# Patient Record
Sex: MALE | Race: OTHER | NOT HISPANIC OR LATINO
[De-identification: names, ages, dates, MRNs, and addresses within clinical notes are randomized per-mention and may not be internally consistent; named-entity substitution may affect disease eponyms.]

---

## 2017-05-04 ENCOUNTER — APPOINTMENT (OUTPATIENT)
Dept: UROLOGY | Facility: CLINIC | Age: 72
End: 2017-05-04

## 2017-05-04 LAB
APPEARANCE: CLEAR
BACTERIA: NEGATIVE
BILIRUBIN URINE: NEGATIVE
BLOOD URINE: NEGATIVE
COLOR: YELLOW
GLUCOSE QUALITATIVE U: NORMAL MG/DL
HYALINE CASTS: 2 /LPF
KETONES URINE: NEGATIVE
LEUKOCYTE ESTERASE URINE: NEGATIVE
MICROSCOPIC-UA: NORMAL
NITRITE URINE: NEGATIVE
PH URINE: 5.5
PROTEIN URINE: NEGATIVE MG/DL
PSA FREE FLD-MCNC: 56.7 %
PSA FREE SERPL-MCNC: 0.2 NG/ML
PSA SERPL-MCNC: 0.35 NG/ML
RED BLOOD CELLS URINE: 6 /HPF
SPECIFIC GRAVITY URINE: 1.02
SQUAMOUS EPITHELIAL CELLS: 0 /HPF
UROBILINOGEN URINE: NORMAL MG/DL
WHITE BLOOD CELLS URINE: 2 /HPF

## 2018-01-30 ENCOUNTER — TRANSCRIPTION ENCOUNTER (OUTPATIENT)
Age: 73
End: 2018-01-30

## 2018-01-30 ENCOUNTER — OUTPATIENT (OUTPATIENT)
Dept: OUTPATIENT SERVICES | Facility: HOSPITAL | Age: 73
LOS: 1 days | End: 2018-01-30
Payer: MEDICARE

## 2018-01-30 VITALS
DIASTOLIC BLOOD PRESSURE: 73 MMHG | HEART RATE: 72 BPM | RESPIRATION RATE: 17 BRPM | SYSTOLIC BLOOD PRESSURE: 125 MMHG | OXYGEN SATURATION: 100 %

## 2018-01-30 VITALS
SYSTOLIC BLOOD PRESSURE: 132 MMHG | OXYGEN SATURATION: 100 % | HEART RATE: 67 BPM | TEMPERATURE: 98 F | RESPIRATION RATE: 17 BRPM | HEIGHT: 67 IN | DIASTOLIC BLOOD PRESSURE: 72 MMHG | WEIGHT: 201.94 LBS

## 2018-01-30 DIAGNOSIS — H25.812 COMBINED FORMS OF AGE-RELATED CATARACT, LEFT EYE: ICD-10-CM

## 2018-01-30 DIAGNOSIS — Z96.652 PRESENCE OF LEFT ARTIFICIAL KNEE JOINT: Chronic | ICD-10-CM

## 2018-01-30 DIAGNOSIS — Z96.659 PRESENCE OF UNSPECIFIED ARTIFICIAL KNEE JOINT: Chronic | ICD-10-CM

## 2018-01-30 LAB
GLUCOSE BLDC GLUCOMTR-MCNC: 123 MG/DL — HIGH (ref 70–99)
GLUCOSE BLDC GLUCOMTR-MCNC: 72 MG/DL — SIGNIFICANT CHANGE UP (ref 70–99)

## 2018-01-30 PROCEDURE — 66984 XCAPSL CTRC RMVL W/O ECP: CPT | Mod: LT

## 2018-01-30 PROCEDURE — 82962 GLUCOSE BLOOD TEST: CPT

## 2018-01-30 PROCEDURE — V2632: CPT

## 2018-01-30 NOTE — ASU DISCHARGE PLAN (ADULT/PEDIATRIC). - PT EDUC
intraocular lens/Implant card (specify) other (specify)/Implant card (specify)/Intraocular lens implant (IOL) Eye shield with instructions , sunglasses and eye kit given to patient.

## 2018-01-30 NOTE — ASU PATIENT PROFILE, ADULT - PMH
Cardiomyopathy  LVH, LV diastolic dysfunction, EF 42%  Diabetes    Hyperlipidemia    Hypertension    Osteoarthritis

## 2018-01-30 NOTE — ASU DISCHARGE PLAN (ADULT/PEDIATRIC). - PROCEDURE
left eye cataract with IOL implant Left eye phacoemulsification cataract extraction with Intraocular Lens implant

## 2018-01-30 NOTE — ASU DISCHARGE PLAN (ADULT/PEDIATRIC). - NOTIFY
Fever greater than 101/Pain not relieved by Medications/Persistent Nausea and Vomiting Pain not relieved by Medications/Bleeding that does not stop/Fever greater than 101/Swelling that continues/Persistent Nausea and Vomiting

## 2018-05-17 ENCOUNTER — APPOINTMENT (OUTPATIENT)
Dept: UROLOGY | Facility: CLINIC | Age: 73
End: 2018-05-17
Payer: MEDICARE

## 2018-05-17 LAB
APPEARANCE: CLEAR
BACTERIA: NEGATIVE
BILIRUBIN URINE: NEGATIVE
BLOOD URINE: NEGATIVE
COLOR: YELLOW
GLUCOSE QUALITATIVE U: NEGATIVE MG/DL
KETONES URINE: NEGATIVE
LEUKOCYTE ESTERASE URINE: NEGATIVE
MICROSCOPIC-UA: NORMAL
NITRITE URINE: NEGATIVE
PH URINE: 6
PROTEIN URINE: NEGATIVE MG/DL
RED BLOOD CELLS URINE: 1 /HPF
SPECIFIC GRAVITY URINE: 1.01
SQUAMOUS EPITHELIAL CELLS: 0 /HPF
UROBILINOGEN URINE: NEGATIVE MG/DL
WHITE BLOOD CELLS URINE: 0 /HPF

## 2018-05-17 PROCEDURE — 99214 OFFICE O/P EST MOD 30 MIN: CPT

## 2018-05-18 LAB
PSA FREE FLD-MCNC: 54.5
PSA FREE SERPL-MCNC: 0.18 NG/ML
PSA SERPL-MCNC: 0.33 NG/ML

## 2019-05-15 ENCOUNTER — APPOINTMENT (OUTPATIENT)
Dept: UROLOGY | Facility: CLINIC | Age: 74
End: 2019-05-15
Payer: MEDICARE

## 2019-05-15 PROCEDURE — 99214 OFFICE O/P EST MOD 30 MIN: CPT

## 2019-05-15 NOTE — PHYSICAL EXAM
[General Appearance - Well Developed] : well developed [General Appearance - Well Nourished] : well nourished [Normal Appearance] : normal appearance [Well Groomed] : well groomed [General Appearance - In No Acute Distress] : no acute distress [Abdomen Tenderness] : non-tender [Abdomen Soft] : soft [Costovertebral Angle Tenderness] : no ~M costovertebral angle tenderness [Urethral Meatus] : meatus normal [Urinary Bladder Findings] : the bladder was normal on palpation [Testes Mass (___cm)] : there were no testicular masses [Scrotum] : the scrotum was normal [No Prostate Nodules] : no prostate nodules [Edema] : no peripheral edema [] : no respiratory distress [Respiration, Rhythm And Depth] : normal respiratory rhythm and effort [Oriented To Time, Place, And Person] : oriented to person, place, and time [Exaggerated Use Of Accessory Muscles For Inspiration] : no accessory muscle use [Affect] : the affect was normal [Mood] : the mood was normal [Not Anxious] : not anxious [Normal Station and Gait] : the gait and station were normal for the patient's age [No Focal Deficits] : no focal deficits [No Palpable Adenopathy] : no palpable adenopathy

## 2019-05-16 LAB
APPEARANCE: CLEAR
BACTERIA: NEGATIVE
BILIRUBIN URINE: NEGATIVE
BLOOD URINE: NEGATIVE
COLOR: NORMAL
GLUCOSE QUALITATIVE U: NEGATIVE
HYALINE CASTS: 0 /LPF
KETONES URINE: NEGATIVE
LEUKOCYTE ESTERASE URINE: NEGATIVE
MICROSCOPIC-UA: NORMAL
NITRITE URINE: NEGATIVE
PH URINE: 6.5
PROTEIN URINE: NEGATIVE
RED BLOOD CELLS URINE: 1 /HPF
SPECIFIC GRAVITY URINE: 1.01
SQUAMOUS EPITHELIAL CELLS: 0 /HPF
UROBILINOGEN URINE: NORMAL
WHITE BLOOD CELLS URINE: 0 /HPF

## 2019-12-25 ENCOUNTER — EMERGENCY (EMERGENCY)
Facility: HOSPITAL | Age: 74
LOS: 1 days | End: 2019-12-25
Attending: EMERGENCY MEDICINE
Payer: MEDICARE

## 2019-12-25 VITALS
WEIGHT: 173.06 LBS | OXYGEN SATURATION: 99 % | SYSTOLIC BLOOD PRESSURE: 164 MMHG | TEMPERATURE: 98 F | HEART RATE: 70 BPM | RESPIRATION RATE: 19 BRPM | DIASTOLIC BLOOD PRESSURE: 77 MMHG | HEIGHT: 68 IN

## 2019-12-25 DIAGNOSIS — Z96.659 PRESENCE OF UNSPECIFIED ARTIFICIAL KNEE JOINT: Chronic | ICD-10-CM

## 2019-12-25 DIAGNOSIS — Z96.652 PRESENCE OF LEFT ARTIFICIAL KNEE JOINT: Chronic | ICD-10-CM

## 2019-12-25 LAB
ALBUMIN SERPL ELPH-MCNC: 4 G/DL — SIGNIFICANT CHANGE UP (ref 3.3–5)
ALP SERPL-CCNC: 72 U/L — SIGNIFICANT CHANGE UP (ref 40–120)
ALT FLD-CCNC: 17 U/L — SIGNIFICANT CHANGE UP (ref 10–45)
ANION GAP SERPL CALC-SCNC: 13 MMOL/L — SIGNIFICANT CHANGE UP (ref 5–17)
AST SERPL-CCNC: 25 U/L — SIGNIFICANT CHANGE UP (ref 10–40)
BASOPHILS # BLD AUTO: 0.01 K/UL — SIGNIFICANT CHANGE UP (ref 0–0.2)
BASOPHILS NFR BLD AUTO: 0.2 % — SIGNIFICANT CHANGE UP (ref 0–2)
BILIRUB SERPL-MCNC: 0.3 MG/DL — SIGNIFICANT CHANGE UP (ref 0.2–1.2)
BUN SERPL-MCNC: 14 MG/DL — SIGNIFICANT CHANGE UP (ref 7–23)
CALCIUM SERPL-MCNC: 9.4 MG/DL — SIGNIFICANT CHANGE UP (ref 8.4–10.5)
CHLORIDE SERPL-SCNC: 106 MMOL/L — SIGNIFICANT CHANGE UP (ref 96–108)
CO2 SERPL-SCNC: 23 MMOL/L — SIGNIFICANT CHANGE UP (ref 22–31)
CREAT SERPL-MCNC: 0.87 MG/DL — SIGNIFICANT CHANGE UP (ref 0.5–1.3)
EOSINOPHIL # BLD AUTO: 0.31 K/UL — SIGNIFICANT CHANGE UP (ref 0–0.5)
EOSINOPHIL NFR BLD AUTO: 4.9 % — SIGNIFICANT CHANGE UP (ref 0–6)
GLUCOSE BLDC GLUCOMTR-MCNC: 113 MG/DL — HIGH (ref 70–99)
GLUCOSE BLDC GLUCOMTR-MCNC: 128 MG/DL — HIGH (ref 70–99)
GLUCOSE SERPL-MCNC: 86 MG/DL — SIGNIFICANT CHANGE UP (ref 70–99)
HCT VFR BLD CALC: 43 % — SIGNIFICANT CHANGE UP (ref 39–50)
HGB BLD-MCNC: 13.6 G/DL — SIGNIFICANT CHANGE UP (ref 13–17)
IMM GRANULOCYTES NFR BLD AUTO: 0.2 % — SIGNIFICANT CHANGE UP (ref 0–1.5)
LYMPHOCYTES # BLD AUTO: 0.97 K/UL — LOW (ref 1–3.3)
LYMPHOCYTES # BLD AUTO: 15.3 % — SIGNIFICANT CHANGE UP (ref 13–44)
MCHC RBC-ENTMCNC: 29.4 PG — SIGNIFICANT CHANGE UP (ref 27–34)
MCHC RBC-ENTMCNC: 31.6 GM/DL — LOW (ref 32–36)
MCV RBC AUTO: 92.9 FL — SIGNIFICANT CHANGE UP (ref 80–100)
MONOCYTES # BLD AUTO: 0.6 K/UL — SIGNIFICANT CHANGE UP (ref 0–0.9)
MONOCYTES NFR BLD AUTO: 9.5 % — SIGNIFICANT CHANGE UP (ref 2–14)
NEUTROPHILS # BLD AUTO: 4.44 K/UL — SIGNIFICANT CHANGE UP (ref 1.8–7.4)
NEUTROPHILS NFR BLD AUTO: 69.9 % — SIGNIFICANT CHANGE UP (ref 43–77)
NRBC # BLD: 0 /100 WBCS — SIGNIFICANT CHANGE UP (ref 0–0)
PLATELET # BLD AUTO: 234 K/UL — SIGNIFICANT CHANGE UP (ref 150–400)
POTASSIUM SERPL-MCNC: 4.1 MMOL/L — SIGNIFICANT CHANGE UP (ref 3.5–5.3)
POTASSIUM SERPL-SCNC: 4.1 MMOL/L — SIGNIFICANT CHANGE UP (ref 3.5–5.3)
PROT SERPL-MCNC: 6.8 G/DL — SIGNIFICANT CHANGE UP (ref 6–8.3)
RBC # BLD: 4.63 M/UL — SIGNIFICANT CHANGE UP (ref 4.2–5.8)
RBC # FLD: 13.6 % — SIGNIFICANT CHANGE UP (ref 10.3–14.5)
SODIUM SERPL-SCNC: 142 MMOL/L — SIGNIFICANT CHANGE UP (ref 135–145)
WBC # BLD: 6.34 K/UL — SIGNIFICANT CHANGE UP (ref 3.8–10.5)
WBC # FLD AUTO: 6.34 K/UL — SIGNIFICANT CHANGE UP (ref 3.8–10.5)

## 2019-12-25 PROCEDURE — 99218: CPT

## 2019-12-25 RX ORDER — SODIUM CHLORIDE 9 MG/ML
1000 INJECTION, SOLUTION INTRAVENOUS
Refills: 0 | Status: DISCONTINUED | OUTPATIENT
Start: 2019-12-25 | End: 2019-12-30

## 2019-12-25 RX ORDER — INSULIN LISPRO 100/ML
VIAL (ML) SUBCUTANEOUS
Refills: 0 | Status: DISCONTINUED | OUTPATIENT
Start: 2019-12-25 | End: 2019-12-30

## 2019-12-25 RX ORDER — SODIUM CHLORIDE 9 MG/ML
3 INJECTION INTRAMUSCULAR; INTRAVENOUS; SUBCUTANEOUS EVERY 12 HOURS
Refills: 0 | Status: DISCONTINUED | OUTPATIENT
Start: 2019-12-25 | End: 2019-12-30

## 2019-12-25 RX ORDER — GLUCAGON INJECTION, SOLUTION 0.5 MG/.1ML
1 INJECTION, SOLUTION SUBCUTANEOUS ONCE
Refills: 0 | Status: DISCONTINUED | OUTPATIENT
Start: 2019-12-25 | End: 2019-12-30

## 2019-12-25 RX ORDER — METFORMIN HYDROCHLORIDE 850 MG/1
1000 TABLET ORAL
Refills: 0 | Status: DISCONTINUED | OUTPATIENT
Start: 2019-12-25 | End: 2019-12-25

## 2019-12-25 RX ORDER — CARVEDILOL PHOSPHATE 80 MG/1
6.25 CAPSULE, EXTENDED RELEASE ORAL EVERY 12 HOURS
Refills: 0 | Status: DISCONTINUED | OUTPATIENT
Start: 2019-12-25 | End: 2019-12-30

## 2019-12-25 RX ORDER — DONEPEZIL HYDROCHLORIDE 10 MG/1
10 TABLET, FILM COATED ORAL AT BEDTIME
Refills: 0 | Status: DISCONTINUED | OUTPATIENT
Start: 2019-12-25 | End: 2019-12-30

## 2019-12-25 RX ORDER — DIPHENHYDRAMINE HCL 50 MG
25 CAPSULE ORAL ONCE
Refills: 0 | Status: COMPLETED | OUTPATIENT
Start: 2019-12-25 | End: 2019-12-25

## 2019-12-25 RX ORDER — SODIUM CHLORIDE 9 MG/ML
500 INJECTION INTRAMUSCULAR; INTRAVENOUS; SUBCUTANEOUS ONCE
Refills: 0 | Status: COMPLETED | OUTPATIENT
Start: 2019-12-25 | End: 2019-12-25

## 2019-12-25 RX ORDER — DEXTROSE 50 % IN WATER 50 %
25 SYRINGE (ML) INTRAVENOUS ONCE
Refills: 0 | Status: DISCONTINUED | OUTPATIENT
Start: 2019-12-25 | End: 2019-12-30

## 2019-12-25 RX ORDER — DEXTROSE 50 % IN WATER 50 %
12.5 SYRINGE (ML) INTRAVENOUS ONCE
Refills: 0 | Status: DISCONTINUED | OUTPATIENT
Start: 2019-12-25 | End: 2019-12-30

## 2019-12-25 RX ORDER — FAMOTIDINE 10 MG/ML
20 INJECTION INTRAVENOUS ONCE
Refills: 0 | Status: DISCONTINUED | OUTPATIENT
Start: 2019-12-25 | End: 2019-12-25

## 2019-12-25 RX ORDER — FAMOTIDINE 10 MG/ML
20 INJECTION INTRAVENOUS ONCE
Refills: 0 | Status: COMPLETED | OUTPATIENT
Start: 2019-12-25 | End: 2019-12-25

## 2019-12-25 RX ORDER — DIPHENHYDRAMINE HCL 50 MG
50 CAPSULE ORAL ONCE
Refills: 0 | Status: COMPLETED | OUTPATIENT
Start: 2019-12-25 | End: 2019-12-25

## 2019-12-25 RX ORDER — METFORMIN HYDROCHLORIDE 850 MG/1
1000 TABLET ORAL
Refills: 0 | Status: DISCONTINUED | OUTPATIENT
Start: 2019-12-25 | End: 2019-12-30

## 2019-12-25 RX ORDER — ASPIRIN/CALCIUM CARB/MAGNESIUM 324 MG
81 TABLET ORAL DAILY
Refills: 0 | Status: DISCONTINUED | OUTPATIENT
Start: 2019-12-25 | End: 2019-12-30

## 2019-12-25 RX ORDER — DEXTROSE 50 % IN WATER 50 %
15 SYRINGE (ML) INTRAVENOUS ONCE
Refills: 0 | Status: DISCONTINUED | OUTPATIENT
Start: 2019-12-25 | End: 2019-12-30

## 2019-12-25 RX ORDER — FAMOTIDINE 10 MG/ML
20 INJECTION INTRAVENOUS
Refills: 0 | Status: DISCONTINUED | OUTPATIENT
Start: 2019-12-25 | End: 2019-12-30

## 2019-12-25 RX ADMIN — CARVEDILOL PHOSPHATE 6.25 MILLIGRAM(S): 80 CAPSULE, EXTENDED RELEASE ORAL at 21:44

## 2019-12-25 RX ADMIN — Medication 25 MILLIGRAM(S): at 21:45

## 2019-12-25 RX ADMIN — SODIUM CHLORIDE 500 MILLILITER(S): 9 INJECTION INTRAMUSCULAR; INTRAVENOUS; SUBCUTANEOUS at 11:20

## 2019-12-25 RX ADMIN — Medication 50 MILLIGRAM(S): at 11:15

## 2019-12-25 RX ADMIN — METFORMIN HYDROCHLORIDE 1000 MILLIGRAM(S): 850 TABLET ORAL at 21:45

## 2019-12-25 RX ADMIN — FAMOTIDINE 20 MILLIGRAM(S): 10 INJECTION INTRAVENOUS at 11:15

## 2019-12-25 RX ADMIN — FAMOTIDINE 20 MILLIGRAM(S): 10 INJECTION INTRAVENOUS at 23:30

## 2019-12-25 RX ADMIN — SODIUM CHLORIDE 3 MILLILITER(S): 9 INJECTION INTRAMUSCULAR; INTRAVENOUS; SUBCUTANEOUS at 13:15

## 2019-12-25 RX ADMIN — Medication 125 MILLIGRAM(S): at 11:16

## 2019-12-25 NOTE — ED CDU PROVIDER DISPOSITION NOTE - PATIENT PORTAL LINK FT
You can access the FollowMyHealth Patient Portal offered by [facility] by registering at the following website: [email or web address]. By joining [facility]’s FollowMyHealth portal, you will also be able to view your health information using other applications (apps) compatible with our system.

## 2019-12-25 NOTE — ED CDU PROVIDER DISPOSITION NOTE - NSFOLLOWUPINSTRUCTIONS_ED_ALL_ED_FT
As recommended by Dermatology.......... 1. Please follow up with your PMD in 1-5 days.  Follow up with dermatology within 2-3 weeks - see attached info.    2. Take prednisone 40mg once daily as prescribed. Apply triamcinolone cream topically daily.    3. For continued itching/discomfort take benadryl as needed.    4. Return to ED for difficulty speaking, difficulty breathing, fever, chills, or any other concerns.

## 2019-12-25 NOTE — ED CDU PROVIDER DISPOSITION NOTE - ATTENDING CONTRIBUTION TO CARE
CDU DISCHARGE NOTE - Dr. Barney Attending : Patient is stable for discharge to home.  Labs and tests reviewed with the patient.  The patient is to follow up with their doctor as discussed.     I have personally seen and examined this patient. I have discussed the case with the ACP. I have reviewed all pertinent clinical information, including history, physical exam, plan and the ACP’s note and agree except as noted.

## 2019-12-25 NOTE — ED CDU PROVIDER DISPOSITION NOTE - CLINICAL COURSE
75 y/o M with PMHx fo HTN and DM presents c/o allergic reaction/rash. As per pt he was seen by derm 2 weeks ago, diagnosed with "severe eczema, contact dermatitis", given a list of suggested body washes, shampoo, and moisturizing cream (Cetaphil), also given alcbometasone? 5% cream for the face and fluocinolone 0.01% oil for the scalp. Family also relates that they changed their detergent to a more hypoallergenic version. Pt states yesterday he began having pruritus of the forearms and antecubital fossae, this morning awoke with worsening rash of the face, neck, scalp, and forearms. Also notes some pruritus of the lower abdomen. Pt denies SOB, dyspnea, throat itching or closing, drooling, change in voice, nuchal rigidity, sore or lesions in the mouth, genitals, or anus, CP, abd pain, n/v/d. No prior similar episodes. Pt recently started B12 injections, denies other new meds or medication adjustments.  In ED, patient was given IV steroids, Benadryl and Pepcid. Pt sent to CDU for continued antihistamines, steroids, frequent eval, derm consult in the AM. 73 y/o M with PMHx fo HTN and DM presents c/o allergic reaction/rash. As per pt he was seen by derm 2 weeks ago, diagnosed with "severe eczema, contact dermatitis", given a list of suggested body washes, shampoo, and moisturizing cream (Cetaphil), also given alcbometasone? 5% cream for the face and fluocinolone 0.01% oil for the scalp. Family also relates that they changed their detergent to a more hypoallergenic version. Pt states yesterday he began having pruritus of the forearms and antecubital fossae, this morning awoke with worsening rash of the face, neck, scalp, and forearms. Also notes some pruritus of the lower abdomen. Pt denies SOB, dyspnea, throat itching or closing, drooling, change in voice, nuchal rigidity, sore or lesions in the mouth, genitals, or anus, CP, abd pain, n/v/d. No prior similar episodes. Pt recently started B12 injections, denies other new meds or medication adjustments.  In ED, patient was given IV steroids, Benadryl and Pepcid. Pt sent to CDU for continued antihistamines, steroids, frequent eval, derm consult in the AM.  Pt did well overnight improved with wet to dry dressings, benadryl + steroids. seen by dermatology for biopsy - recommended d/c home with prednisone, triamcinolone cream and followup. Case d/w Dr Barney - pt stable and ready for discharge.

## 2019-12-25 NOTE — ED CDU PROVIDER INITIAL DAY NOTE - PROGRESS NOTE DETAILS
CDU PROGRESS NOTE ROBERTA ARENAS: Received pt at 1900 sign-out. Pt resting in stretcher in NAD. Case/plan reviewed. SKIN: (+) Confluent dark red plaque involving face, scalp, and neck extending to the posterior auricular area and posterolateral neck, also involving b/l forearms, wrists, antecubital fossa, and distal upper arms. +Blanching. +excoriations and fissures. No bullae or vesicles. No ulcers. negative Nikolsky's sign. Scattered urticaria noted to lower abdomen b/l. No palm or sole involvement. No inguinal involvement. Pt reports burning and itching sensation to skin. Will continue with Benadryl prn, Pepcid 20mg BID and steroids. Plan for Dermatology eval in am. CDU PROGRESS NOTE PA DEEPA: Pt resting comfortably, without complaint. NAD, Physical exam unchanged from prior exam. Will continue to monitor. CDU PROGRESS NOTE PA DEEPA: Pt resting in stretcher, reports having increasing itching and burning sensation to face and arms. Physical exam unchanged from prior exam. Will give benadryl 25mg IVP and continue to monitor.

## 2019-12-25 NOTE — ED PROVIDER NOTE - PROGRESS NOTE DETAILS
labs and plan d/w pt. all questions answered. pt agreeable to CDU stay for continued antihistamines, steroids, frequent eval, derm consult in the AM. pt ready and stable for CDU. -Arthur Perez PA-C

## 2019-12-25 NOTE — ED PROVIDER NOTE - PHYSICAL EXAMINATION
GEN: Pt non-toxic, A&O x3.  PSYCH: Affect appropriate.  EYES: Sclera white w/o injection, PERRLA, EOMI.   ENT: Head NCAT. Nose w/o deformity. No auricular TTP. MMM, no oral lesions or ulcers noted. Tolerating secretions, airway patent, no muffled speech/dysarthria. Neck supple FROM, no stridor.   RESP: No evidence of respiratory distress. No chest wall tenderness, CTA b/l, no wheezes, rales, or rhonchi.   CARDIAC: RRR, clear distinct S1, S2, no appreciable murmurs.  ABD: Abdomen soft, non-tender. No CVAT b/l.  VASC: 2+ radial and dorsalis pedis pulses b/l. No edema or calf tenderness.  SKIN: Confluent dark red plaque involving face, scalp, and neck extending to the posterior auricular area and posterolateral neck, also involving b/l forearms, wrists, antecubital fossa, and distal upper arms. +Blanching. +excoriations and fissures. No bullae or vesicles. No ulcers. negative Nikolsky's sign. Scattered urticaria noted to lower abdomen b/l. No palm or sole involvement. No inguinal involvement.

## 2019-12-25 NOTE — ED CDU PROVIDER INITIAL DAY NOTE - DETAILS
Allergic reaction  -solumedrol  -benadryl  -derm consult if no improvement in symptoms  -case d/w Dr. Cali

## 2019-12-25 NOTE — ED PROVIDER NOTE - OBJECTIVE STATEMENT
73 y/o M with PMHx fo HTN and DM presents c/o allergic reaction/rash. As per pt he was seen by derm 2 weeks ago, diagnosed with "severe eczema, contact dermatitis", given a list of suggested body washes, shampoo, and moisturizing cream (Cetaphil), also given alcbometasone? 5% cream for the face and fluocinolone 0.01% oil for the scalp. Family also relates that they changed their detergent to a more hypoallergenic version. Pt states yesterday he began having pruritus of the forearms and antecubital fossae, this morning awoke with worsening rash of the face, neck, scalp, and forearms. Also notes some pruritus of the lower abdomen. Pt denies SOB, dyspnea, throat itching or closing, drooling, change in voice, nuchal rigidity, sore or lesions in the mouth, genitals, or anus, CP, abd pain, n/v/d. No prior similar episodes. Pt recently started B12 injections, denies other new meds or medication adjustments.

## 2019-12-25 NOTE — ED CDU PROVIDER INITIAL DAY NOTE - MEDICAL DECISION MAKING DETAILS
chi acute on chronic eczema - with erythematous blanching pruritic rash , steroids antihistamine - derm if no improvement -

## 2019-12-25 NOTE — ED PROVIDER NOTE - CLINICAL SUMMARY MEDICAL DECISION MAKING FREE TEXT BOX
pt w chronic eczema - with recent flare has been on regimen of topical steroids and moisturizing cream -- now with worsening pruritic rash to face neck bl arm s and ches t- no sob, no wheezing, no tongue lip or uvula swelling bl periorbital swelling - no m/m invt ,no sloughing of skin, no palms or soles - no constitutional s/s -- likely acute on chronic eczema flare- blanching urticarial rash w/o mm invt -- signfic swelling to face -- will give iv steroids and antihistamine - and obs -- may need prolonged obs based on response to therapy - no new meds detrgents or soaps, no new foods

## 2019-12-25 NOTE — ED ADULT NURSE NOTE - OBJECTIVE STATEMENT
75 yo male presents to ED from home c/o "severe eczema reaction". Patient states he was dx 2 weeks ago by a dermatologist, given a list of different body creams and washes to use with no improvement. Patient states he woke up today with worsening red rash to face, neck, scalp and forearms. Patient denies SOB, CP, throat itching, nvd, fever/chills, sick contacts, falls/loc. Patient A&OX3, breathing spontaneously, airway patent, bl clear lungs, abdomen nontender, +pulses, cap refill <2 seconds. Patient resting in bed, family at bedside plan of care explained.

## 2019-12-26 ENCOUNTER — RESULT REVIEW (OUTPATIENT)
Age: 74
End: 2019-12-26

## 2019-12-26 VITALS
OXYGEN SATURATION: 97 % | SYSTOLIC BLOOD PRESSURE: 148 MMHG | TEMPERATURE: 98 F | DIASTOLIC BLOOD PRESSURE: 67 MMHG | HEART RATE: 87 BPM | RESPIRATION RATE: 18 BRPM

## 2019-12-26 LAB
GLUCOSE BLDC GLUCOMTR-MCNC: 124 MG/DL — HIGH (ref 70–99)
GLUCOSE BLDC GLUCOMTR-MCNC: 63 MG/DL — LOW (ref 70–99)

## 2019-12-26 PROCEDURE — 96376 TX/PRO/DX INJ SAME DRUG ADON: CPT

## 2019-12-26 PROCEDURE — 85027 COMPLETE CBC AUTOMATED: CPT

## 2019-12-26 PROCEDURE — 99217: CPT

## 2019-12-26 PROCEDURE — 88312 SPECIAL STAINS GROUP 1: CPT

## 2019-12-26 PROCEDURE — 88312 SPECIAL STAINS GROUP 1: CPT | Mod: 26

## 2019-12-26 PROCEDURE — 80053 COMPREHEN METABOLIC PANEL: CPT

## 2019-12-26 PROCEDURE — 82962 GLUCOSE BLOOD TEST: CPT

## 2019-12-26 PROCEDURE — 96374 THER/PROPH/DIAG INJ IV PUSH: CPT

## 2019-12-26 PROCEDURE — G0378: CPT

## 2019-12-26 PROCEDURE — 88305 TISSUE EXAM BY PATHOLOGIST: CPT

## 2019-12-26 PROCEDURE — 96375 TX/PRO/DX INJ NEW DRUG ADDON: CPT

## 2019-12-26 PROCEDURE — 88305 TISSUE EXAM BY PATHOLOGIST: CPT | Mod: 26

## 2019-12-26 PROCEDURE — 99284 EMERGENCY DEPT VISIT MOD MDM: CPT | Mod: 25

## 2019-12-26 RX ORDER — DIPHENHYDRAMINE HCL 50 MG
25 CAPSULE ORAL ONCE
Refills: 0 | Status: COMPLETED | OUTPATIENT
Start: 2019-12-26 | End: 2019-12-26

## 2019-12-26 RX ORDER — HYDRALAZINE HCL 50 MG
25 TABLET ORAL ONCE
Refills: 0 | Status: COMPLETED | OUTPATIENT
Start: 2019-12-26 | End: 2019-12-26

## 2019-12-26 RX ADMIN — Medication 25 MILLIGRAM(S): at 00:35

## 2019-12-26 RX ADMIN — Medication 25 MILLIGRAM(S): at 14:02

## 2019-12-26 NOTE — ED CDU PROVIDER SUBSEQUENT DAY NOTE - MEDICAL DECISION MAKING DETAILS
Julia Barney MD - Attending Physician: Pt here with severe atopic dermatitis/eczema. Slight improved with steroids orally. Apply topical petroleum/aquaphor/etc. Awaiting Derm for dispo

## 2019-12-26 NOTE — ED CDU PROVIDER SUBSEQUENT DAY NOTE - PROGRESS NOTE DETAILS
CDU PROGRESS NOTE ROBERTA IBARRA: Pt resting comfortably, reports feeling better than last night. NAD. VSS. Exam consistent with earlier documented with dark red plaques to the face, scalp, neck and b/l forearms on flexion and extension surfaces. No bullae or vesicles. No mucosal involvement or palms/soles. Will get in touch with derm this am for recommendations and continue to monitor. CDU PROGRESS NOTE ROBERTA IBARRA: Pt resting comfortably. NAD. VSS. Rash stable from earlier exam. Pt reporting feeling better after application of petroleum to skin. Spoke to Dermatology - will see pt in CDU for consult. Will continue to monitor. CDU PROGRESS NOTE ROBERTA IBARRA: Pt seen by dermatology for biopsy - recommended d/c home with prednisone, triamcinolone cream and followup. Case d/w Dr Barney - pt stable and ready for discharge.

## 2019-12-26 NOTE — CHART NOTE - NSCHARTNOTEFT_GEN_A_CORE
D/c with:  - pred 40mg PO QD, enough to last 2-3 weeks in case f/u appointment cannot be expedited  - triamcinolone 0.01% ointment BID to affected areas on face, arms, chest, neck - please ask pharmacy to dispense #[id number] gram jar or equivalent given large BSA  - would d/c with hydroxyzine 25mg PO QHS PRN pruritus - avoiding benadryl in this elderly pt w/ mild dementia     Derm will schedule f/u in clinic in 1-2 weeks:    [facility] [place]  [address], [address]  [place] [address]  [phone]    Full note to follow.    Jessica Martinez MD  PGY3, Dermatology Dermatology Brief Chart Note    HPI:  73 y/o M h/o HTN, T2DM, mild dementia presenting to ED w/ rash. Onset x 1-2 weeks. Primarily face, scalp, neck, arms. Seen by outside Derm (Dr. China Barrett) on 12/10, dx as severe eczema/ contact dermatitis and started on alclometasone cream for face + fluocinonide solution for scalp. Worsening over past 2-3 days with significant pruritus, facial/periocular swelling, and difficulty closing eyes. Given famotidine, Benadryl, and IV solumedrol w/ improvement overnight in ED.     Of note, donepezil + enalapril started within last year per pt. Also started B12 injections by PCP after onset of rash. Unsure whether ever on CCBs, PPIs, or HCTZ. No recent sunexposure or other s/sx    Current Meds per wife's list: metformin, carvedilol, ASA 81, Aleve PRN, donepezil, enalapril, dipyridamole, Vit B12/E     SHx: used to work at [employer] (now closed) in [facility]    PHYSICAL EXAM:   Vital Signs Last 24 Hrs  T(C): 36.8 (26 Dec 2019 11:34), Max: 36.8 (26 Dec 2019 11:34)  T(F): 98.3 (26 Dec 2019 11:34), Max: 98.3 (26 Dec 2019 11:34)  HR: 78 (26 Dec 2019 11:34) (58 - 78)  BP: 148/67 (26 Dec 2019 11:34) (130/79 - 187/77)  BP(mean): --  RR: 18 (26 Dec 2019 11:34) (18 - 18)  SpO2: 97% (26 Dec 2019 11:34) (97% - 100%)    Skin exam:  The patient was alert, well nourished, and in no apparent distress. Oropharynx showed no ulcerations. There was no visible lymphadenopathy. Conjunctiva were non-injected. There was no clubbing or edema of extremities.    The scalp, hair, face, eyebrows, lips, oropharynx , neck, chest, back, buttocks, extremities X 4, hands, feet, nails were examined.    Of note on skin exam:   - intensely erythematous plaques over photodistributed areas of face, scalp, b/l arms, posterior neck, b/l flanks. +superficial desquamation. + Ectropion due to periocular involvement.    ASSESSMENT/PLAN:  # Photodistributed eruption  Favor drug-induced phototoxic / photoallergic eruption vs. SCLE vs. PMLE. Suspected culprit at this time is enalapril, though traditionally associated w/ non-photodistributed drug rashes. Wife and daughter to check whether CCBs, HCTZ, or PPIs were recently discontinued.   - d/c with pred 40mg PO QD, enough to last 2-3 weeks in case f/u appointment cannot be expedited  - triamcinolone 0.01% ointment BID to affected areas on face, arms, chest, neck - please ask pharmacy to dispense #[id number] gram jar or equivalent given large BSA  - would d/c with hydroxyzine 25mg PO QHS PRN pruritus - avoiding benadryl in this elderly pt w/ mild dementia     Derm will schedule f/u in clinic in 1-2 weeks:    [facility]  [address], [address]  [place] [address]  [phone]    Patient seen at bedside and discussed with Dermatology attending Dr. Holt remotely. Recommendations were verbally communicated to primary team. Please call [phone] with any questions.    Jessica Martinez MD  PGY3 Dermatology

## 2019-12-26 NOTE — ED ADULT NURSE REASSESSMENT NOTE - NS ED NURSE REASSESS COMMENT FT1
13.00 Received pt from RN Elias Vazquez,  Pt is observed for allergic reaction  received is A&OX 3 denies any respiratory distress, N/V/D fever chills CP SOB  throat pain no respiratory distress noted.  IV in place, patent and free of signs of infiltration, V/S stable, pt afebrile, pt denies pain at this time. Pt educated on unit and unit rules, instructed patient to notify RN of any needed assistance, Pt verbalizes understanding, Call bell placed within reach. Safety maintained.  Pt has facial dryness redness extended upto neck  saline moisturizing is applied as pt seems very uncomfortable due to extreme dryness Will continue to monitor.
Report taken from Estelita WALLER. States patient had a good night with no complaints. Will continue to monitor.
Pt received from SRIDHAR Mendoza. Pt oriented to CDU & plan of care was discussed. Pt endorses some "stinging, burning and itching" of the skin. Gauze over face placed by previous nurse moistened with NS. Pt states he has a lot of relief of symptoms from NS. Flaky, dry reddened skin noted throughout face, neck and forearms. Safety & comfort measures maintained. Call bell in reach. Will continue to monitor.

## 2019-12-26 NOTE — ED CDU PROVIDER SUBSEQUENT DAY NOTE - ATTENDING CONTRIBUTION TO CARE
Julia Barney MD - Attending Physician: I have personally seen and examined this patient. I have discussed the case with the ACP. I have reviewed all pertinent clinical information, including history, physical exam, plan and the ACP’s note and agree except as noted. See MDM

## 2019-12-26 NOTE — ED CDU PROVIDER SUBSEQUENT DAY NOTE - HISTORY
CDU PROGRESS NOTE PA DEEPA: Pt resting comfortably, NAD, VSS. No interval change from previous exam. SKIN: (+) Confluent dark red plaque involving face, scalp, and neck extending to the posterior auricular area and posterolateral neck, also involving b/l forearms, wrists, antecubital fossa, and distal upper arms. +excoriations and fissures. No bullae or vesicles. No ulcers. negative Nikolsky's sign. Scattered urticaria noted to lower abdomen b/l. No palm or sole involvement. No inguinal involvement.

## 2019-12-28 NOTE — ED POST DISCHARGE NOTE - DETAILS
12/28/19; Attempted to contact pt and inform of results and ensure derm f/u. No answer, phone kept ringing, unable to lvm at this time. Will reattempt. - JOSE EscalanteC 12/29/2019: m to call back admin line - Zoe Devries PA-C

## 2020-01-07 ENCOUNTER — LABORATORY RESULT (OUTPATIENT)
Age: 75
End: 2020-01-07

## 2020-01-07 ENCOUNTER — APPOINTMENT (OUTPATIENT)
Dept: DERMATOLOGY | Facility: CLINIC | Age: 75
End: 2020-01-07
Payer: MEDICARE

## 2020-01-07 VITALS — WEIGHT: 174 LBS | BODY MASS INDEX: 26.46 KG/M2

## 2020-01-07 PROCEDURE — 99214 OFFICE O/P EST MOD 30 MIN: CPT

## 2020-01-08 LAB
ENA SS-A AB SER IA-ACNC: <0.2 AL
ENA SS-B AB SER IA-ACNC: <0.2 AL

## 2020-01-21 ENCOUNTER — APPOINTMENT (OUTPATIENT)
Dept: DERMATOLOGY | Facility: CLINIC | Age: 75
End: 2020-01-21
Payer: MEDICARE

## 2020-01-21 PROCEDURE — 99214 OFFICE O/P EST MOD 30 MIN: CPT

## 2020-03-03 ENCOUNTER — APPOINTMENT (OUTPATIENT)
Dept: DERMATOLOGY | Facility: CLINIC | Age: 75
End: 2020-03-03
Payer: MEDICARE

## 2020-03-03 VITALS — BODY MASS INDEX: 26.83 KG/M2 | WEIGHT: 177 LBS | HEIGHT: 68 IN

## 2020-03-03 PROCEDURE — 99214 OFFICE O/P EST MOD 30 MIN: CPT

## 2020-04-09 ENCOUNTER — APPOINTMENT (OUTPATIENT)
Dept: DERMATOLOGY | Facility: CLINIC | Age: 75
End: 2020-04-09
Payer: MEDICARE

## 2020-04-09 DIAGNOSIS — T50.905A ADVERSE EFFECT OF UNSPECIFIED DRUGS, MEDICAMENTS AND BIOLOGICAL SUBSTANCES, INITIAL ENCOUNTER: ICD-10-CM

## 2020-04-09 PROCEDURE — 99214 OFFICE O/P EST MOD 30 MIN: CPT | Mod: 95

## 2020-05-05 ENCOUNTER — APPOINTMENT (OUTPATIENT)
Dept: DERMATOLOGY | Facility: CLINIC | Age: 75
End: 2020-05-05

## 2020-05-20 ENCOUNTER — APPOINTMENT (OUTPATIENT)
Dept: UROLOGY | Facility: CLINIC | Age: 75
End: 2020-05-20
Payer: MEDICARE

## 2020-05-20 PROCEDURE — 99213 OFFICE O/P EST LOW 20 MIN: CPT

## 2020-05-20 NOTE — PHYSICAL EXAM
[General Appearance - Well Developed] : well developed [Normal Appearance] : normal appearance [General Appearance - Well Nourished] : well nourished [Well Groomed] : well groomed [General Appearance - In No Acute Distress] : no acute distress [Abdomen Soft] : soft [Abdomen Tenderness] : non-tender [Costovertebral Angle Tenderness] : no ~M costovertebral angle tenderness [Urinary Bladder Findings] : the bladder was normal on palpation [Urethral Meatus] : meatus normal [Scrotum] : the scrotum was normal [Testes Mass (___cm)] : there were no testicular masses [No Prostate Nodules] : no prostate nodules [] : no respiratory distress [Edema] : no peripheral edema [Exaggerated Use Of Accessory Muscles For Inspiration] : no accessory muscle use [Respiration, Rhythm And Depth] : normal respiratory rhythm and effort [Oriented To Time, Place, And Person] : oriented to person, place, and time [Affect] : the affect was normal [Mood] : the mood was normal [Normal Station and Gait] : the gait and station were normal for the patient's age [Not Anxious] : not anxious [No Focal Deficits] : no focal deficits [No Palpable Adenopathy] : no palpable adenopathy

## 2020-05-21 LAB
APPEARANCE: ABNORMAL
BACTERIA: NEGATIVE
BILIRUBIN URINE: NEGATIVE
BLOOD URINE: NEGATIVE
COLOR: NORMAL
GLUCOSE QUALITATIVE U: ABNORMAL
HYALINE CASTS: 0 /LPF
KETONES URINE: NEGATIVE
LEUKOCYTE ESTERASE URINE: NEGATIVE
MICROSCOPIC-UA: NORMAL
NITRITE URINE: NEGATIVE
PH URINE: 7
PROTEIN URINE: ABNORMAL
PSA FREE FLD-MCNC: 47 %
PSA FREE SERPL-MCNC: 0.16 NG/ML
PSA SERPL-MCNC: 0.34 NG/ML
RED BLOOD CELLS URINE: 1 /HPF
SPECIFIC GRAVITY URINE: 1.02
SQUAMOUS EPITHELIAL CELLS: 0 /HPF
UROBILINOGEN URINE: NORMAL
WHITE BLOOD CELLS URINE: 0 /HPF

## 2020-06-16 ENCOUNTER — APPOINTMENT (OUTPATIENT)
Dept: DERMATOLOGY | Facility: CLINIC | Age: 75
End: 2020-06-16

## 2020-12-03 ENCOUNTER — APPOINTMENT (OUTPATIENT)
Dept: OPHTHALMOLOGY | Facility: CLINIC | Age: 75
End: 2020-12-03
Payer: MEDICARE

## 2020-12-03 ENCOUNTER — NON-APPOINTMENT (OUTPATIENT)
Age: 75
End: 2020-12-03

## 2020-12-03 PROCEDURE — 92134 CPTRZ OPH DX IMG PST SGM RTA: CPT

## 2020-12-03 PROCEDURE — 92015 DETERMINE REFRACTIVE STATE: CPT

## 2020-12-03 PROCEDURE — 92014 COMPRE OPH EXAM EST PT 1/>: CPT

## 2021-05-20 ENCOUNTER — APPOINTMENT (OUTPATIENT)
Dept: UROLOGY | Facility: CLINIC | Age: 76
End: 2021-05-20
Payer: MEDICARE

## 2021-05-20 PROCEDURE — 99214 OFFICE O/P EST MOD 30 MIN: CPT

## 2021-05-21 LAB
APPEARANCE: CLEAR
BACTERIA: NEGATIVE
BILIRUBIN URINE: NEGATIVE
BLOOD URINE: NEGATIVE
CALCIUM OXALATE CRYSTALS: ABNORMAL
COLOR: YELLOW
GLUCOSE QUALITATIVE U: NEGATIVE
HYALINE CASTS: 0 /LPF
KETONES URINE: NEGATIVE
LEUKOCYTE ESTERASE URINE: NEGATIVE
MICROSCOPIC-UA: NORMAL
NITRITE URINE: NEGATIVE
PH URINE: 6
PROTEIN URINE: ABNORMAL
PSA FREE FLD-MCNC: 55 %
PSA FREE SERPL-MCNC: 0.18 NG/ML
PSA SERPL-MCNC: 0.34 NG/ML
RED BLOOD CELLS URINE: 3 /HPF
SPECIFIC GRAVITY URINE: 1.03
SQUAMOUS EPITHELIAL CELLS: 1 /HPF
UROBILINOGEN URINE: NORMAL
WHITE BLOOD CELLS URINE: 1 /HPF

## 2021-06-04 ENCOUNTER — NON-APPOINTMENT (OUTPATIENT)
Age: 76
End: 2021-06-04

## 2021-06-04 ENCOUNTER — APPOINTMENT (OUTPATIENT)
Dept: OPHTHALMOLOGY | Facility: CLINIC | Age: 76
End: 2021-06-04
Payer: MEDICARE

## 2021-06-04 PROCEDURE — 92134 CPTRZ OPH DX IMG PST SGM RTA: CPT

## 2021-06-04 PROCEDURE — 92014 COMPRE OPH EXAM EST PT 1/>: CPT

## 2021-12-08 ENCOUNTER — LABORATORY RESULT (OUTPATIENT)
Age: 76
End: 2021-12-08

## 2021-12-08 ENCOUNTER — APPOINTMENT (OUTPATIENT)
Dept: CARDIOLOGY | Facility: CLINIC | Age: 76
End: 2021-12-08
Payer: MEDICARE

## 2021-12-08 ENCOUNTER — NON-APPOINTMENT (OUTPATIENT)
Age: 76
End: 2021-12-08

## 2021-12-08 VITALS
TEMPERATURE: 98.5 F | DIASTOLIC BLOOD PRESSURE: 78 MMHG | BODY MASS INDEX: 26.83 KG/M2 | SYSTOLIC BLOOD PRESSURE: 154 MMHG | WEIGHT: 177 LBS | OXYGEN SATURATION: 98 % | HEIGHT: 68 IN | HEART RATE: 65 BPM

## 2021-12-08 DIAGNOSIS — Z00.00 ENCOUNTER FOR GENERAL ADULT MEDICAL EXAMINATION W/OUT ABNORMAL FINDINGS: ICD-10-CM

## 2021-12-08 DIAGNOSIS — L30.9 DERMATITIS, UNSPECIFIED: ICD-10-CM

## 2021-12-08 PROCEDURE — 93000 ELECTROCARDIOGRAM COMPLETE: CPT

## 2021-12-08 PROCEDURE — 99204 OFFICE O/P NEW MOD 45 MIN: CPT

## 2021-12-08 RX ORDER — TRIAMCINOLONE ACETONIDE 1 MG/G
0.1 OINTMENT TOPICAL
Qty: 1 | Refills: 0 | Status: DISCONTINUED | COMMUNITY
Start: 2020-03-20 | End: 2021-12-08

## 2021-12-08 RX ORDER — HYDROCORTISONE 25 MG/G
2.5 OINTMENT TOPICAL
Qty: 1 | Refills: 3 | Status: DISCONTINUED | COMMUNITY
Start: 2020-03-03 | End: 2021-12-08

## 2021-12-08 RX ORDER — PREDNISONE 10 MG/1
10 TABLET ORAL
Qty: 30 | Refills: 1 | Status: DISCONTINUED | COMMUNITY
Start: 2020-03-03 | End: 2021-12-08

## 2021-12-08 RX ORDER — PREDNISONE 20 MG/1
20 TABLET ORAL
Qty: 60 | Refills: 1 | Status: DISCONTINUED | COMMUNITY
Start: 2020-01-07 | End: 2021-12-08

## 2021-12-09 NOTE — HISTORY OF PRESENT ILLNESS
[FreeTextEntry1] : Pt presents today as a new patient to establish care with Dr. Boss referred by old PCP Dr. Mo. \par The patient presents for evaluation of high blood pressure. Patient is currently tolerating the current antihypertensive regime and they deny headaches, stiff neck, visual changes, pedal Edema or PND. They also are here for follow-up of elevated cholesterol and continued care of diabetes mellitus. Patient is currently tolerating medication and denies muscle pain, joint pain, back pain, tea, nausea, vomiting, abdominal pain or diarrhea. The patient is trying to follow a low cholesterol diet.  The patient is following the diabetic regimen and is tolerating hypoglycemic agents and following the diet.\par Pt states he has had 50 lb weight loss and appetite loss over the past 2 year. 
The patient is a 28y Male complaining of chest discomfort.

## 2021-12-13 LAB
ALBUMIN SERPL ELPH-MCNC: 4.5 G/DL
ALP BLD-CCNC: 69 U/L
ALT SERPL-CCNC: 13 U/L
ANION GAP SERPL CALC-SCNC: 9 MMOL/L
APPEARANCE: CLEAR
AST SERPL-CCNC: 21 U/L
BACTERIA: NEGATIVE
BASOPHILS # BLD AUTO: 0.03 K/UL
BASOPHILS NFR BLD AUTO: 0.4 %
BILIRUB DIRECT SERPL-MCNC: 0.1 MG/DL
BILIRUB INDIRECT SERPL-MCNC: 0.3 MG/DL
BILIRUB SERPL-MCNC: 0.4 MG/DL
BILIRUBIN URINE: NEGATIVE
BLOOD URINE: NEGATIVE
BUN SERPL-MCNC: 12 MG/DL
CALCIUM SERPL-MCNC: 9.8 MG/DL
CHLORIDE SERPL-SCNC: 106 MMOL/L
CHOLEST SERPL-MCNC: 215 MG/DL
CO2 SERPL-SCNC: 27 MMOL/L
COLOR: NORMAL
COVID-19 SPIKE DOMAIN ANTIBODY INTERPRETATION: POSITIVE
CREAT SERPL-MCNC: 0.89 MG/DL
EOSINOPHIL # BLD AUTO: 0.26 K/UL
EOSINOPHIL NFR BLD AUTO: 3.7 %
ESTIMATED AVERAGE GLUCOSE: 117 MG/DL
GLUCOSE QUALITATIVE U: NEGATIVE
GLUCOSE SERPL-MCNC: 93 MG/DL
HBA1C MFR BLD HPLC: 5.7 %
HCT VFR BLD CALC: 39.3 %
HDLC SERPL-MCNC: 67 MG/DL
HGB BLD-MCNC: 12.6 G/DL
HYALINE CASTS: 0 /LPF
IMM GRANULOCYTES NFR BLD AUTO: 0.3 %
KETONES URINE: NEGATIVE
LDLC SERPL CALC-MCNC: 131 MG/DL
LDLC SERPL DIRECT ASSAY-MCNC: 124 MG/DL
LEUKOCYTE ESTERASE URINE: NEGATIVE
LYMPHOCYTES # BLD AUTO: 1.72 K/UL
LYMPHOCYTES NFR BLD AUTO: 24.2 %
MAGNESIUM SERPL-MCNC: 2.2 MG/DL
MAN DIFF?: NORMAL
MCHC RBC-ENTMCNC: 29.9 PG
MCHC RBC-ENTMCNC: 32.1 GM/DL
MCV RBC AUTO: 93.3 FL
MICROSCOPIC-UA: NORMAL
MONOCYTES # BLD AUTO: 0.67 K/UL
MONOCYTES NFR BLD AUTO: 9.4 %
NEUTROPHILS # BLD AUTO: 4.4 K/UL
NEUTROPHILS NFR BLD AUTO: 62 %
NITRITE URINE: NEGATIVE
NONHDLC SERPL-MCNC: 148 MG/DL
OSMOLALITY SERPL: 296 MOSMOL/KG
PH URINE: 6.5
PHOSPHATE SERPL-MCNC: 3 MG/DL
PLATELET # BLD AUTO: 274 K/UL
POTASSIUM SERPL-SCNC: 4.1 MMOL/L
PROT SERPL-MCNC: 7.4 G/DL
PROTEIN URINE: NORMAL
PSA SERPL-MCNC: 0.35 NG/ML
RBC # BLD: 4.21 M/UL
RBC # FLD: 14.6 %
RED BLOOD CELLS URINE: 2 /HPF
SARS-COV-2 AB SERPL IA-ACNC: >250 U/ML
SODIUM SERPL-SCNC: 142 MMOL/L
SPECIFIC GRAVITY URINE: 1.02
SQUAMOUS EPITHELIAL CELLS: 0 /HPF
T3RU NFR SERPL: 1.2 TBI
T4 FREE SERPL-MCNC: 1.1 NG/DL
T4 SERPL-MCNC: 7.4 UG/DL
TRIGL SERPL-MCNC: 89 MG/DL
TSH SERPL-ACNC: 1.57 UIU/ML
URATE SERPL-MCNC: 5.1 MG/DL
UROBILINOGEN URINE: NORMAL
WBC # FLD AUTO: 7.1 K/UL
WHITE BLOOD CELLS URINE: 0 /HPF

## 2021-12-21 ENCOUNTER — NON-APPOINTMENT (OUTPATIENT)
Age: 76
End: 2021-12-21

## 2021-12-22 ENCOUNTER — APPOINTMENT (OUTPATIENT)
Dept: CARDIOLOGY | Facility: CLINIC | Age: 76
End: 2021-12-22
Payer: MEDICARE

## 2021-12-22 PROCEDURE — A9500: CPT

## 2021-12-22 PROCEDURE — 93306 TTE W/DOPPLER COMPLETE: CPT

## 2021-12-22 PROCEDURE — 93015 CV STRESS TEST SUPVJ I&R: CPT

## 2021-12-22 PROCEDURE — 78452 HT MUSCLE IMAGE SPECT MULT: CPT

## 2021-12-27 ENCOUNTER — NON-APPOINTMENT (OUTPATIENT)
Age: 76
End: 2021-12-27

## 2021-12-28 ENCOUNTER — APPOINTMENT (OUTPATIENT)
Dept: CT IMAGING | Facility: CLINIC | Age: 76
End: 2021-12-28
Payer: MEDICARE

## 2021-12-28 ENCOUNTER — OUTPATIENT (OUTPATIENT)
Dept: OUTPATIENT SERVICES | Facility: HOSPITAL | Age: 76
LOS: 1 days | End: 2021-12-28
Payer: SELF-PAY

## 2021-12-28 DIAGNOSIS — Z00.00 ENCOUNTER FOR GENERAL ADULT MEDICAL EXAMINATION WITHOUT ABNORMAL FINDINGS: ICD-10-CM

## 2021-12-28 DIAGNOSIS — I10 ESSENTIAL (PRIMARY) HYPERTENSION: ICD-10-CM

## 2021-12-28 DIAGNOSIS — E78.5 HYPERLIPIDEMIA, UNSPECIFIED: ICD-10-CM

## 2021-12-28 DIAGNOSIS — E11.9 TYPE 2 DIABETES MELLITUS WITHOUT COMPLICATIONS: ICD-10-CM

## 2021-12-28 DIAGNOSIS — Z96.652 PRESENCE OF LEFT ARTIFICIAL KNEE JOINT: Chronic | ICD-10-CM

## 2021-12-28 DIAGNOSIS — Z96.659 PRESENCE OF UNSPECIFIED ARTIFICIAL KNEE JOINT: Chronic | ICD-10-CM

## 2021-12-28 DIAGNOSIS — R63.4 ABNORMAL WEIGHT LOSS: ICD-10-CM

## 2021-12-28 PROCEDURE — 74176 CT ABD & PELVIS W/O CONTRAST: CPT | Mod: 26,MH

## 2021-12-28 PROCEDURE — 74176 CT ABD & PELVIS W/O CONTRAST: CPT

## 2021-12-28 PROCEDURE — 75571 CT HRT W/O DYE W/CA TEST: CPT | Mod: 26,MH

## 2021-12-28 PROCEDURE — 75571 CT HRT W/O DYE W/CA TEST: CPT

## 2022-01-05 ENCOUNTER — NON-APPOINTMENT (OUTPATIENT)
Age: 77
End: 2022-01-05

## 2022-01-05 ENCOUNTER — APPOINTMENT (OUTPATIENT)
Dept: CARDIOLOGY | Facility: CLINIC | Age: 77
End: 2022-01-05
Payer: MEDICARE

## 2022-01-05 VITALS
TEMPERATURE: 98 F | HEART RATE: 71 BPM | DIASTOLIC BLOOD PRESSURE: 62 MMHG | BODY MASS INDEX: 26.83 KG/M2 | HEIGHT: 68 IN | WEIGHT: 177 LBS | SYSTOLIC BLOOD PRESSURE: 124 MMHG | OXYGEN SATURATION: 99 %

## 2022-01-05 DIAGNOSIS — R91.8 OTHER NONSPECIFIC ABNORMAL FINDING OF LUNG FIELD: ICD-10-CM

## 2022-01-05 DIAGNOSIS — R94.31 ABNORMAL ELECTROCARDIOGRAM [ECG] [EKG]: ICD-10-CM

## 2022-01-05 PROCEDURE — 99214 OFFICE O/P EST MOD 30 MIN: CPT

## 2022-01-05 PROCEDURE — 93000 ELECTROCARDIOGRAM COMPLETE: CPT

## 2022-01-05 NOTE — HISTORY OF PRESENT ILLNESS
[FreeTextEntry1] : The patient presents for evaluation of high blood pressure. Patient is currently tolerating the current antihypertensive regime and they deny headaches, stiff neck, visual changes, pedal Edema or PND. They also are here for follow-up of elevated cholesterol and continued care of diabetes mellitus. Patient is currently tolerating medication and denies muscle pain, joint pain, back pain, tea, nausea, vomiting, abdominal pain or diarrhea. The patient is trying to follow a low cholesterol diet.  The patient is following the diabetic regimen and is tolerating hypoglycemic agents and following the diet.\par Pt states he also has an appt with GI Dr. Valdez next week for weight loss and anemia\par Pt started prevagen for memory loss and he feels it is helping. He has seen neuro Dr. Vargas in the past. \par Pt s/p echo, nst\par Pt s/p CT a/p and calium scoring as well

## 2022-03-02 ENCOUNTER — NON-APPOINTMENT (OUTPATIENT)
Age: 77
End: 2022-03-02

## 2022-03-02 ENCOUNTER — APPOINTMENT (OUTPATIENT)
Dept: CARDIOLOGY | Facility: CLINIC | Age: 77
End: 2022-03-02
Payer: MEDICARE

## 2022-03-02 VITALS
HEART RATE: 67 BPM | HEIGHT: 68 IN | OXYGEN SATURATION: 98 % | SYSTOLIC BLOOD PRESSURE: 120 MMHG | TEMPERATURE: 97.6 F | WEIGHT: 177 LBS | DIASTOLIC BLOOD PRESSURE: 80 MMHG | BODY MASS INDEX: 26.83 KG/M2

## 2022-03-02 DIAGNOSIS — M62.81 MUSCLE WEAKNESS (GENERALIZED): ICD-10-CM

## 2022-03-02 PROCEDURE — 93000 ELECTROCARDIOGRAM COMPLETE: CPT

## 2022-03-02 PROCEDURE — 99214 OFFICE O/P EST MOD 30 MIN: CPT

## 2022-03-03 LAB
ALBUMIN SERPL ELPH-MCNC: 4.5 G/DL
ALP BLD-CCNC: 65 U/L
ALT SERPL-CCNC: 24 U/L
ANION GAP SERPL CALC-SCNC: 16 MMOL/L
APO B SERPL-MCNC: 48 MG/DL
AST SERPL-CCNC: 27 U/L
BASOPHILS # BLD AUTO: 0.01 K/UL
BASOPHILS NFR BLD AUTO: 0.2 %
BILIRUB DIRECT SERPL-MCNC: 0.1 MG/DL
BILIRUB INDIRECT SERPL-MCNC: 0.2 MG/DL
BILIRUB SERPL-MCNC: 0.4 MG/DL
BUN SERPL-MCNC: 14 MG/DL
CALCIUM SERPL-MCNC: 9.9 MG/DL
CHLORIDE SERPL-SCNC: 103 MMOL/L
CHOLEST SERPL-MCNC: 118 MG/DL
CK SERPL-CCNC: 223 U/L
CO2 SERPL-SCNC: 22 MMOL/L
COVID-19 SPIKE DOMAIN ANTIBODY INTERPRETATION: POSITIVE
CREAT SERPL-MCNC: 0.94 MG/DL
EGFR: 85 ML/MIN/1.73M2
EOSINOPHIL # BLD AUTO: 0.24 K/UL
EOSINOPHIL NFR BLD AUTO: 3.7 %
ESTIMATED AVERAGE GLUCOSE: 126 MG/DL
FERRITIN SERPL-MCNC: 176 NG/ML
GLUCOSE SERPL-MCNC: 81 MG/DL
HBA1C MFR BLD HPLC: 6 %
HCT VFR BLD CALC: 39.3 %
HDLC SERPL-MCNC: 60 MG/DL
HGB BLD-MCNC: 12.9 G/DL
IMM GRANULOCYTES NFR BLD AUTO: 0.3 %
IRON SERPL-MCNC: 87 UG/DL
LDLC SERPL CALC-MCNC: 44 MG/DL
LDLC SERPL DIRECT ASSAY-MCNC: 44 MG/DL
LYMPHOCYTES # BLD AUTO: 1.66 K/UL
LYMPHOCYTES NFR BLD AUTO: 25.6 %
MAN DIFF?: NORMAL
MCHC RBC-ENTMCNC: 29.4 PG
MCHC RBC-ENTMCNC: 32.8 GM/DL
MCV RBC AUTO: 89.5 FL
MONOCYTES # BLD AUTO: 0.54 K/UL
MONOCYTES NFR BLD AUTO: 8.3 %
NEUTROPHILS # BLD AUTO: 4.02 K/UL
NEUTROPHILS NFR BLD AUTO: 61.9 %
NONHDLC SERPL-MCNC: 57 MG/DL
PLATELET # BLD AUTO: 231 K/UL
POTASSIUM SERPL-SCNC: 4.2 MMOL/L
PROT SERPL-MCNC: 7.4 G/DL
RBC # BLD: 4.39 M/UL
RBC # FLD: 13.6 %
SARS-COV-2 AB SERPL IA-ACNC: >250 U/ML
SODIUM SERPL-SCNC: 141 MMOL/L
TRIGL SERPL-MCNC: 66 MG/DL
WBC # FLD AUTO: 6.49 K/UL

## 2022-03-04 NOTE — HISTORY OF PRESENT ILLNESS
[FreeTextEntry1] : The patient presents for evaluation of high blood pressure. Patient is currently tolerating the current antihypertensive regime and they deny headaches, stiff neck, visual changes, pedal Edema or PND. They also are here for follow-up of elevated cholesterol and continued care of diabetes mellitus. Patient is currently tolerating medication and denies muscle pain, joint pain, back pain, tea, nausea, vomiting, abdominal pain or diarrhea. The patient is trying to follow a low cholesterol diet. The patient is following the diabetic regimen and is tolerating hypoglycemic agents and following the diet.\par Pt started prevagen for memory loss but is not sure if it is helping. \par Pt still losing weight and anemia. Pt had colonoscopy with GI Dr. Valdez.

## 2022-03-11 ENCOUNTER — APPOINTMENT (OUTPATIENT)
Dept: OPHTHALMOLOGY | Facility: CLINIC | Age: 77
End: 2022-03-11
Payer: MEDICARE

## 2022-03-11 ENCOUNTER — NON-APPOINTMENT (OUTPATIENT)
Age: 77
End: 2022-03-11

## 2022-03-11 PROCEDURE — 92014 COMPRE OPH EXAM EST PT 1/>: CPT

## 2022-03-11 PROCEDURE — 92134 CPTRZ OPH DX IMG PST SGM RTA: CPT

## 2022-03-11 PROCEDURE — 92015 DETERMINE REFRACTIVE STATE: CPT

## 2022-04-14 NOTE — ED CDU PROVIDER SUBSEQUENT DAY NOTE - NS ED MD PROGRESS NOTE ADD
Add Progress Note... Double O-Z Flap Text: The defect edges were debeveled with a #15 scalpel blade.  Given the location of the defect, shape of the defect and the proximity to free margins a Double O-Z flap was deemed most appropriate.  Using a sterile surgical marker, an appropriate transposition flap was drawn incorporating the defect and placing the expected incisions within the relaxed skin tension lines where possible. The area thus outlined was incised deep to adipose tissue with a #15 scalpel blade.  The skin margins were undermined to an appropriate distance in all directions utilizing iris scissors.

## 2022-05-19 ENCOUNTER — APPOINTMENT (OUTPATIENT)
Dept: UROLOGY | Facility: CLINIC | Age: 77
End: 2022-05-19
Payer: MEDICARE

## 2022-05-19 DIAGNOSIS — R35.0 FREQUENCY OF MICTURITION: ICD-10-CM

## 2022-05-19 DIAGNOSIS — N40.1 BENIGN PROSTATIC HYPERPLASIA WITH LOWER URINARY TRACT SYMPMS: ICD-10-CM

## 2022-05-19 DIAGNOSIS — N13.8 BENIGN PROSTATIC HYPERPLASIA WITH LOWER URINARY TRACT SYMPMS: ICD-10-CM

## 2022-05-19 DIAGNOSIS — N52.9 MALE ERECTILE DYSFUNCTION, UNSPECIFIED: ICD-10-CM

## 2022-05-19 PROCEDURE — 99214 OFFICE O/P EST MOD 30 MIN: CPT

## 2022-05-20 LAB
APPEARANCE: CLEAR
BACTERIA: NEGATIVE
BILIRUBIN URINE: NEGATIVE
BLOOD URINE: NEGATIVE
COLOR: COLORLESS
GLUCOSE QUALITATIVE U: NEGATIVE
HYALINE CASTS: 0 /LPF
KETONES URINE: NEGATIVE
LEUKOCYTE ESTERASE URINE: NEGATIVE
MICROSCOPIC-UA: NORMAL
NITRITE URINE: NEGATIVE
PH URINE: 6
PROTEIN URINE: NEGATIVE
RED BLOOD CELLS URINE: 1 /HPF
SPECIFIC GRAVITY URINE: 1.01
SQUAMOUS EPITHELIAL CELLS: 0 /HPF
UROBILINOGEN URINE: NORMAL
WHITE BLOOD CELLS URINE: 0 /HPF

## 2022-06-08 ENCOUNTER — NON-APPOINTMENT (OUTPATIENT)
Age: 77
End: 2022-06-08

## 2022-06-08 ENCOUNTER — APPOINTMENT (OUTPATIENT)
Dept: CARDIOLOGY | Facility: CLINIC | Age: 77
End: 2022-06-08
Payer: MEDICARE

## 2022-06-08 VITALS
HEIGHT: 68 IN | DIASTOLIC BLOOD PRESSURE: 62 MMHG | SYSTOLIC BLOOD PRESSURE: 132 MMHG | BODY MASS INDEX: 25.61 KG/M2 | WEIGHT: 169 LBS

## 2022-06-08 DIAGNOSIS — R63.4 ABNORMAL WEIGHT LOSS: ICD-10-CM

## 2022-06-08 PROCEDURE — 99214 OFFICE O/P EST MOD 30 MIN: CPT

## 2022-06-08 PROCEDURE — 93000 ELECTROCARDIOGRAM COMPLETE: CPT

## 2022-06-08 NOTE — HISTORY OF PRESENT ILLNESS
[FreeTextEntry1] : The patient here for evaluation of high blood pressure. Patient is currently tolerating the current antihypertensive regime and they deny headaches, stiff neck, visual changes, or PND. The patient has been trying to stay on a low-sodium diet.\par \par The patient is here for follow-up of elevated cholesterol. Patient is currently tolerating medication and denies muscle pain, joint pain, back pain,  urinary changes , nausea, vomiting, abdominal pain or diarrhea. The patient is trying to follow a low cholesterol diet.\par \par The patient also presents for continued care of diabetes mellitus. Patient is following the diabetic regimen. Patient is tolerating hypoglycemic agents and following the diet. Patient denies any visual changes, blood in the urine, abdominal pain, nausea, vomiting, myalgias, arthralgias, paresthesia’s and syncope. The patient denies any chest discomfort, shortness of breath or new neurologic signs or symptoms. Patient denies any polyuria, worsening nocturia, or polydipsia.\par \par Pt is here for follow up of dilated aorta they have been taking medication as prescribed  and  blood pressure have been observed in the a reasonable range, their are no reports of worrisome symptoms, such as tearing or "knife like' back pain, chest pain or syncope.\par \par He c/o episode of dizziness yesterday. He felt like he was off balance s/p fall onto right hip. He denies hitting head, was able to catch himself. He denies weakness, of limbs, facial drooping, difficulty speaking or blurred/loss of vision.\par \par \par

## 2022-06-10 LAB
25(OH)D3 SERPL-MCNC: 52.1 NG/ML
ALBUMIN SERPL ELPH-MCNC: 4.6 G/DL
ALP BLD-CCNC: 63 U/L
ALT SERPL-CCNC: 14 U/L
ANION GAP SERPL CALC-SCNC: 13 MMOL/L
AST SERPL-CCNC: 23 U/L
BASOPHILS # BLD AUTO: 0.03 K/UL
BASOPHILS NFR BLD AUTO: 0.4 %
BILIRUB DIRECT SERPL-MCNC: 0.1 MG/DL
BILIRUB INDIRECT SERPL-MCNC: 0.2 MG/DL
BILIRUB SERPL-MCNC: 0.3 MG/DL
BUN SERPL-MCNC: 13 MG/DL
CALCIUM SERPL-MCNC: 9.9 MG/DL
CHLORIDE SERPL-SCNC: 101 MMOL/L
CHOLEST SERPL-MCNC: 117 MG/DL
CK SERPL-CCNC: 469 U/L
CO2 SERPL-SCNC: 26 MMOL/L
CREAT SERPL-MCNC: 0.89 MG/DL
EGFR: 90 ML/MIN/1.73M2
EOSINOPHIL # BLD AUTO: 0.22 K/UL
EOSINOPHIL NFR BLD AUTO: 2.9 %
ESTIMATED AVERAGE GLUCOSE: 126 MG/DL
FOLATE SERPL-MCNC: 17.2 NG/ML
GLUCOSE SERPL-MCNC: 66 MG/DL
HBA1C MFR BLD HPLC: 6 %
HCT VFR BLD CALC: 39.2 %
HDLC SERPL-MCNC: 59 MG/DL
HGB BLD-MCNC: 12.6 G/DL
IMM GRANULOCYTES NFR BLD AUTO: 0.3 %
LDLC SERPL CALC-MCNC: 43 MG/DL
LDLC SERPL DIRECT ASSAY-MCNC: 39 MG/DL
LYMPHOCYTES # BLD AUTO: 1.73 K/UL
LYMPHOCYTES NFR BLD AUTO: 23.1 %
MAN DIFF?: NORMAL
MCHC RBC-ENTMCNC: 29.4 PG
MCHC RBC-ENTMCNC: 32.1 GM/DL
MCV RBC AUTO: 91.4 FL
MONOCYTES # BLD AUTO: 0.84 K/UL
MONOCYTES NFR BLD AUTO: 11.2 %
NEUTROPHILS # BLD AUTO: 4.66 K/UL
NEUTROPHILS NFR BLD AUTO: 62.1 %
NONHDLC SERPL-MCNC: 58 MG/DL
PLATELET # BLD AUTO: 228 K/UL
POTASSIUM SERPL-SCNC: 3.9 MMOL/L
PROT SERPL-MCNC: 7.4 G/DL
RBC # BLD: 4.29 M/UL
RBC # FLD: 14.9 %
SODIUM SERPL-SCNC: 140 MMOL/L
T3FREE SERPL-MCNC: 2.75 PG/ML
T4 FREE SERPL-MCNC: 1.1 NG/DL
TRIGL SERPL-MCNC: 72 MG/DL
TSH SERPL-ACNC: 2.05 UIU/ML
VIT B12 SERPL-MCNC: 706 PG/ML
WBC # FLD AUTO: 7.5 K/UL

## 2022-07-07 ENCOUNTER — NON-APPOINTMENT (OUTPATIENT)
Age: 77
End: 2022-07-07

## 2022-09-08 ENCOUNTER — APPOINTMENT (OUTPATIENT)
Dept: CARDIOLOGY | Facility: CLINIC | Age: 77
End: 2022-09-08

## 2022-09-08 ENCOUNTER — NON-APPOINTMENT (OUTPATIENT)
Age: 77
End: 2022-09-08

## 2022-09-08 VITALS — SYSTOLIC BLOOD PRESSURE: 142 MMHG | OXYGEN SATURATION: 99 % | DIASTOLIC BLOOD PRESSURE: 80 MMHG | HEART RATE: 66 BPM

## 2022-09-08 DIAGNOSIS — Z23 ENCOUNTER FOR IMMUNIZATION: ICD-10-CM

## 2022-09-08 PROCEDURE — 93000 ELECTROCARDIOGRAM COMPLETE: CPT

## 2022-09-09 LAB
ALBUMIN SERPL ELPH-MCNC: 4.5 G/DL
ALP BLD-CCNC: 66 U/L
ALT SERPL-CCNC: 27 U/L
ANION GAP SERPL CALC-SCNC: 13 MMOL/L
APO B SERPL-MCNC: 54 MG/DL
AST SERPL-CCNC: 31 U/L
BASOPHILS # BLD AUTO: 0.02 K/UL
BASOPHILS NFR BLD AUTO: 0.3 %
BILIRUB DIRECT SERPL-MCNC: 0.1 MG/DL
BILIRUB INDIRECT SERPL-MCNC: 0.2 MG/DL
BILIRUB SERPL-MCNC: 0.3 MG/DL
BUN SERPL-MCNC: 16 MG/DL
CALCIUM SERPL-MCNC: 10.2 MG/DL
CHLORIDE SERPL-SCNC: 103 MMOL/L
CHOLEST SERPL-MCNC: 124 MG/DL
CK SERPL-CCNC: 223 U/L
CO2 SERPL-SCNC: 26 MMOL/L
COVID-19 NUCLEOCAPSID  GAM ANTIBODY INTERPRETATION: NEGATIVE
COVID-19 SPIKE DOMAIN ANTIBODY INTERPRETATION: POSITIVE
CREAT SERPL-MCNC: 0.93 MG/DL
EGFR: 86 ML/MIN/1.73M2
EOSINOPHIL # BLD AUTO: 0.21 K/UL
EOSINOPHIL NFR BLD AUTO: 3.1 %
ESTIMATED AVERAGE GLUCOSE: 134 MG/DL
GLUCOSE SERPL-MCNC: 80 MG/DL
HBA1C MFR BLD HPLC: 6.3 %
HCT VFR BLD CALC: 41.1 %
HDLC SERPL-MCNC: 60 MG/DL
HGB BLD-MCNC: 13.7 G/DL
IMM GRANULOCYTES NFR BLD AUTO: 0.4 %
LDLC SERPL CALC-MCNC: 47 MG/DL
LDLC SERPL DIRECT ASSAY-MCNC: 46 MG/DL
LYMPHOCYTES # BLD AUTO: 1.76 K/UL
LYMPHOCYTES NFR BLD AUTO: 25.9 %
MAN DIFF?: NORMAL
MCHC RBC-ENTMCNC: 30.4 PG
MCHC RBC-ENTMCNC: 33.3 GM/DL
MCV RBC AUTO: 91.1 FL
MONOCYTES # BLD AUTO: 0.67 K/UL
MONOCYTES NFR BLD AUTO: 9.9 %
NEUTROPHILS # BLD AUTO: 4.1 K/UL
NEUTROPHILS NFR BLD AUTO: 60.4 %
NONHDLC SERPL-MCNC: 64 MG/DL
PLATELET # BLD AUTO: 222 K/UL
POTASSIUM SERPL-SCNC: 4.2 MMOL/L
PROT SERPL-MCNC: 7.5 G/DL
RBC # BLD: 4.51 M/UL
RBC # FLD: 14.6 %
SARS-COV-2 AB SERPL IA-ACNC: >250 U/ML
SARS-COV-2 AB SERPL QL IA: 0.09 INDEX
SODIUM SERPL-SCNC: 141 MMOL/L
TRIGL SERPL-MCNC: 85 MG/DL
WBC # FLD AUTO: 6.79 K/UL

## 2022-09-10 NOTE — HISTORY OF PRESENT ILLNESS
[FreeTextEntry1] : The patient here for evaluation of high blood pressure. Patient is currently tolerating the current antihypertensive regime and they deny headaches, stiff neck, visual changes, or PND. The patient has been trying to stay on a low-sodium diet.\par \par The patient is here for follow-up of elevated cholesterol. Patient is currently tolerating medication and denies muscle pain, joint pain, back pain, urinary changes , nausea, vomiting, abdominal pain or diarrhea. The patient is trying to follow a low cholesterol diet.\par \par The patient also presents for continued care of diabetes mellitus. Patient is following the diabetic regimen. Patient is tolerating hypoglycemic agents and following the diet. Patient denies any visual changes, blood in the urine, abdominal pain, nausea, vomiting, myalgias, arthralgias, paresthesia’s and syncope. The patient denies any chest discomfort, shortness of breath or new neurologic signs or symptoms. Patient denies any polyuria, worsening nocturia, or polydipsia.\par \par Pt is here for follow up of dilated aorta they have been taking medication as prescribed and blood pressure have been observed in the a reasonable range, their are no reports of worrisome symptoms, such as tearing or "knife like' back pain, chest pain or syncope.\par \par Pt c/o left knee pain\par \par Also complaining hearing loss left ear

## 2022-09-13 ENCOUNTER — NON-APPOINTMENT (OUTPATIENT)
Age: 77
End: 2022-09-13

## 2022-09-13 ENCOUNTER — APPOINTMENT (OUTPATIENT)
Dept: OPHTHALMOLOGY | Facility: CLINIC | Age: 77
End: 2022-09-13

## 2022-09-13 PROCEDURE — 92014 COMPRE OPH EXAM EST PT 1/>: CPT

## 2022-09-13 PROCEDURE — 92134 CPTRZ OPH DX IMG PST SGM RTA: CPT

## 2022-10-24 ENCOUNTER — NON-APPOINTMENT (OUTPATIENT)
Age: 77
End: 2022-10-24

## 2022-10-24 ENCOUNTER — APPOINTMENT (OUTPATIENT)
Dept: ORTHOPEDIC SURGERY | Facility: CLINIC | Age: 77
End: 2022-10-24

## 2022-10-24 VITALS — SYSTOLIC BLOOD PRESSURE: 131 MMHG | HEART RATE: 65 BPM | DIASTOLIC BLOOD PRESSURE: 66 MMHG

## 2022-10-24 DIAGNOSIS — S92.416A: ICD-10-CM

## 2022-10-24 DIAGNOSIS — Z96.652 PRESENCE OF LEFT ARTIFICIAL KNEE JOINT: ICD-10-CM

## 2022-10-24 DIAGNOSIS — M79.673 PAIN IN UNSPECIFIED FOOT: ICD-10-CM

## 2022-10-24 PROCEDURE — 73562 X-RAY EXAM OF KNEE 3: CPT | Mod: LT

## 2022-10-24 PROCEDURE — 99215 OFFICE O/P EST HI 40 MIN: CPT

## 2022-10-24 PROCEDURE — 73630 X-RAY EXAM OF FOOT: CPT | Mod: LT

## 2022-10-27 ENCOUNTER — RESULT REVIEW (OUTPATIENT)
Age: 77
End: 2022-10-27

## 2022-10-27 ENCOUNTER — OUTPATIENT (OUTPATIENT)
Dept: OUTPATIENT SERVICES | Facility: HOSPITAL | Age: 77
LOS: 1 days | End: 2022-10-27
Payer: MEDICARE

## 2022-10-27 ENCOUNTER — APPOINTMENT (OUTPATIENT)
Dept: CT IMAGING | Facility: CLINIC | Age: 77
End: 2022-10-27

## 2022-10-27 DIAGNOSIS — Z00.00 ENCOUNTER FOR GENERAL ADULT MEDICAL EXAMINATION WITHOUT ABNORMAL FINDINGS: ICD-10-CM

## 2022-10-27 DIAGNOSIS — Z96.659 PRESENCE OF UNSPECIFIED ARTIFICIAL KNEE JOINT: Chronic | ICD-10-CM

## 2022-10-27 DIAGNOSIS — Z96.652 PRESENCE OF LEFT ARTIFICIAL KNEE JOINT: Chronic | ICD-10-CM

## 2022-10-27 PROCEDURE — 76376 3D RENDER W/INTRP POSTPROCES: CPT | Mod: 26

## 2022-10-27 PROCEDURE — 73700 CT LOWER EXTREMITY W/O DYE: CPT | Mod: 26,LT,MH

## 2022-10-27 PROCEDURE — 73700 CT LOWER EXTREMITY W/O DYE: CPT

## 2022-10-27 PROCEDURE — 76376 3D RENDER W/INTRP POSTPROCES: CPT

## 2022-11-02 ENCOUNTER — LABORATORY RESULT (OUTPATIENT)
Age: 77
End: 2022-11-02

## 2022-11-02 ENCOUNTER — APPOINTMENT (OUTPATIENT)
Dept: ORTHOPEDIC SURGERY | Facility: CLINIC | Age: 77
End: 2022-11-02

## 2022-11-02 VITALS — DIASTOLIC BLOOD PRESSURE: 76 MMHG | HEART RATE: 70 BPM | SYSTOLIC BLOOD PRESSURE: 148 MMHG

## 2022-11-02 DIAGNOSIS — M62.81 MUSCLE WEAKNESS (GENERALIZED): ICD-10-CM

## 2022-11-02 PROCEDURE — 20610 DRAIN/INJ JOINT/BURSA W/O US: CPT | Mod: LT

## 2022-11-02 PROCEDURE — 99213 OFFICE O/P EST LOW 20 MIN: CPT | Mod: 25

## 2022-11-23 ENCOUNTER — APPOINTMENT (OUTPATIENT)
Dept: ORTHOPEDIC SURGERY | Facility: CLINIC | Age: 77
End: 2022-11-23

## 2022-11-23 VITALS — TEMPERATURE: 97.7 F | SYSTOLIC BLOOD PRESSURE: 156 MMHG | HEART RATE: 70 BPM | DIASTOLIC BLOOD PRESSURE: 79 MMHG

## 2022-11-23 PROCEDURE — 99214 OFFICE O/P EST MOD 30 MIN: CPT

## 2022-11-23 PROCEDURE — 73620 X-RAY EXAM OF FOOT: CPT | Mod: LT

## 2022-11-23 PROCEDURE — 73562 X-RAY EXAM OF KNEE 3: CPT | Mod: LT

## 2022-11-29 NOTE — DISCUSSION/SUMMARY
[de-identified] : I had an extensive discussion today with the patient and his wife regarding the imaging and laboratory findings.  Thankfully it does not appear that this is an infection however I am concerned about the degree of osteolysis seen on the CT scan and the risk of possible impending fracture of the left knee implant.  I explained to him that given the amount of wear, there is a high risk for fracture and that treatment recommendations would be for revision left total knee replacement as it would allow for removal of the particle generator which is the plastic component and allow for placement of a new implant for increased stability.  He is going to attempt physical therapy to see if that will help with some symptoms as he is hesitant to proceed with surgery.\par \par Regarding his left great toe, there has been some improvement in his pain and his overall function although he is now experiencing some transfer metatarsalgia in an attempt to limit the weightbearing on the great toe.  I explained to him that this will gradually improve as he continues to heal the fracture and I would like him to darren tape the first and second toes together to help with some additional stability and to limit the amount of pain with ambulation.\par \par He is going to proceed with physical therapy that was given at his prior appointment and he will plan to follow-up with me in the new year.  I explained to him that that of his knee pain worsens or that if he develops fevers or chills he should return to the clinic as soon as possible.  I explained to him that I am concerned that this will fracture if not addressed in a timely manner.  Patient and his wife are understanding and agreement with the plan.

## 2022-11-29 NOTE — HISTORY OF PRESENT ILLNESS
[de-identified] : CLEM TAO  is an 77 year-year-old male presents today for follow-up of left knee pain and left toe pain after a fall with a first toe proximal phalanx fracture treated nonoperatively with a hard sole shoe and darren taping.  He has a history of a left total knee replacement done in 2000 and has recently had left knee pain and buckling sensation. 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 There was also osteolysis and loosening seen of the patella. Aspiration was negative for infection based on cell count and culture. He continues to have left knee pain and buckling despite the use of a patellar stabilizing brace. He has not done physical therapy or completed labwork at this time.  Since his last visit he has had continued left knee pain as well as continued foot pain although it appears to be his second and third toes as his first toe is not bothering him.

## 2022-11-29 NOTE — PHYSICAL EXAM
[de-identified] : General Appearance / Station: Well developed, well nourished, in no acute distress \par Orientation: Oriented to person, place, and time\par Gait & Station: Ambulates with a cane\par Neurologic: Normal leg sensation \par Cardiovascular: Warm extremity \par Lymphatics: No lymphedema \par Generalized Ligament Laxity: Normal \par Stiffness: Normal \par \par SYMPTOMATIC LEFT KNEE:\par Alignment: Neutral \par Skin: Well-healed incision\par Effusion: Large\par Quadriceps: normal .\par Range of motion: Decreased range of motion 5 degrees short of terminal extension to about 100 degrees\par PF crepitus: 1+.\par PF apprehension: none .\par Patella / Patella Tendon: nontender .\par Lachman's: negative \par Valgus @ 30°: negative.\par Varus @ 30°: negative.\par Posterior drawer: negative.\par Palpation: TENDER AT medial and lateral facet of the patella as well as medial joint line\par \par LEFT TOE\par \par There is no open wound or abrasion.  There is no significant pain with palpation of the first toe proximal phalanx and the patient is able to dorsiflex and plantarflex the toe.  He is experiencing some tenderness to palpation on the plantar aspect of the second and third metatarsal heads.  Sensation is intact distally\par  [de-identified] : 3 views of the left knee once again show osteolysis along the medial epicondyle with radiolucency also fell underneath the patella.  There is a small effusion.\par \par 3 views of the left foot show an intra-articular first proximal phalanx fracture with unchanged alignment and interval healing compared to prior radiographs.

## 2022-12-07 DIAGNOSIS — M25.462 EFFUSION, LEFT KNEE: ICD-10-CM

## 2022-12-08 ENCOUNTER — APPOINTMENT (OUTPATIENT)
Dept: CARDIOLOGY | Facility: CLINIC | Age: 77
End: 2022-12-08

## 2022-12-08 ENCOUNTER — NON-APPOINTMENT (OUTPATIENT)
Age: 77
End: 2022-12-08

## 2022-12-08 VITALS
HEART RATE: 71 BPM | DIASTOLIC BLOOD PRESSURE: 72 MMHG | SYSTOLIC BLOOD PRESSURE: 142 MMHG | WEIGHT: 169 LBS | HEIGHT: 68 IN | OXYGEN SATURATION: 99 % | BODY MASS INDEX: 25.61 KG/M2

## 2022-12-08 PROCEDURE — 99214 OFFICE O/P EST MOD 30 MIN: CPT

## 2022-12-08 PROCEDURE — 93000 ELECTROCARDIOGRAM COMPLETE: CPT

## 2022-12-08 NOTE — HISTORY OF PRESENT ILLNESS
[FreeTextEntry1] : The patient here for evaluation of high blood pressure. Patient is currently tolerating the current antihypertensive regime and they deny headaches, stiff neck, visual changes, or PND. The patient has been trying to stay on a low-sodium diet.\par \par The patient is here for follow-up of elevated cholesterol. Patient is currently tolerating medication and denies muscle pain, joint pain, back pain, urinary changes , nausea, vomiting, abdominal pain or diarrhea. The patient is trying to follow a low cholesterol diet.\par \par The patient also presents for continued care of diabetes mellitus. Patient is following the diabetic regimen. Patient is tolerating hypoglycemic agents and following the diet. Patient denies any visual changes, blood in the urine, abdominal pain, nausea, vomiting, myalgias, arthralgias, paresthesia’s and syncope. The patient denies any chest discomfort, shortness of breath or new neurologic signs or symptoms. Patient denies any polyuria, worsening nocturia, or polydipsia.\par \par Pt is here for follow up of dilated aorta they have been taking medication as prescribed and blood pressure have been observed in the a reasonable range, their are no reports of worrisome symptoms, such as tearing or "knife like' back pain, chest pain or syncope.\par \par He was evaluated by ENT (Dr. Mondragon) had hearing test, which showed worsening hearing loss in the left vs right ear. He is s/p MRI  brain which patient reports was negative. He was prescribed ear drops for left ear which he takes twice per day which patient reports mild improvements in symptoms. \par \par He is s/p trip and fall and injured his left knee. He was evaluated by ortho Dr. Duncan and is currently wearing a brace, was advised to start PT which he has yet to do.

## 2022-12-09 LAB
ALBUMIN SERPL ELPH-MCNC: 4.4 G/DL
ALP BLD-CCNC: 72 U/L
ALT SERPL-CCNC: 19 U/L
ANION GAP SERPL CALC-SCNC: 12 MMOL/L
AST SERPL-CCNC: 25 U/L
BASOPHILS # BLD AUTO: 0.03 K/UL
BASOPHILS NFR BLD AUTO: 0.5 %
BILIRUB DIRECT SERPL-MCNC: 0.2 MG/DL
BILIRUB INDIRECT SERPL-MCNC: 0.3 MG/DL
BILIRUB SERPL-MCNC: 0.5 MG/DL
BUN SERPL-MCNC: 15 MG/DL
CALCIUM SERPL-MCNC: 9.6 MG/DL
CHLORIDE SERPL-SCNC: 103 MMOL/L
CHOLEST SERPL-MCNC: 116 MG/DL
CK SERPL-CCNC: 251 U/L
CO2 SERPL-SCNC: 26 MMOL/L
CREAT SERPL-MCNC: 0.91 MG/DL
EGFR: 88 ML/MIN/1.73M2
EOSINOPHIL # BLD AUTO: 0.22 K/UL
EOSINOPHIL NFR BLD AUTO: 3.5 %
ESTIMATED AVERAGE GLUCOSE: 123 MG/DL
GLUCOSE SERPL-MCNC: 94 MG/DL
HBA1C MFR BLD HPLC: 5.9 %
HCT VFR BLD CALC: 40.4 %
HDLC SERPL-MCNC: 60 MG/DL
HGB BLD-MCNC: 13.3 G/DL
IMM GRANULOCYTES NFR BLD AUTO: 0.2 %
LDLC SERPL CALC-MCNC: 43 MG/DL
LDLC SERPL DIRECT ASSAY-MCNC: 41 MG/DL
LYMPHOCYTES # BLD AUTO: 1.3 K/UL
LYMPHOCYTES NFR BLD AUTO: 20.5 %
MAN DIFF?: NORMAL
MCHC RBC-ENTMCNC: 30.1 PG
MCHC RBC-ENTMCNC: 32.9 GM/DL
MCV RBC AUTO: 91.4 FL
MONOCYTES # BLD AUTO: 0.52 K/UL
MONOCYTES NFR BLD AUTO: 8.2 %
NEUTROPHILS # BLD AUTO: 4.26 K/UL
NEUTROPHILS NFR BLD AUTO: 67.1 %
NONHDLC SERPL-MCNC: 56 MG/DL
PLATELET # BLD AUTO: 228 K/UL
POTASSIUM SERPL-SCNC: 4.1 MMOL/L
PROT SERPL-MCNC: 7.3 G/DL
RBC # BLD: 4.42 M/UL
RBC # FLD: 14.4 %
SODIUM SERPL-SCNC: 140 MMOL/L
TRIGL SERPL-MCNC: 64 MG/DL
WBC # FLD AUTO: 6.34 K/UL

## 2023-01-06 ENCOUNTER — APPOINTMENT (OUTPATIENT)
Dept: ORTHOPEDIC SURGERY | Facility: CLINIC | Age: 78
End: 2023-01-06
Payer: MEDICARE

## 2023-01-06 VITALS — SYSTOLIC BLOOD PRESSURE: 128 MMHG | HEART RATE: 69 BPM | DIASTOLIC BLOOD PRESSURE: 68 MMHG

## 2023-01-06 PROCEDURE — 73630 X-RAY EXAM OF FOOT: CPT | Mod: LT

## 2023-01-06 PROCEDURE — 73562 X-RAY EXAM OF KNEE 3: CPT | Mod: LT

## 2023-01-06 PROCEDURE — 99214 OFFICE O/P EST MOD 30 MIN: CPT

## 2023-01-06 RX ORDER — ERGOCALCIFEROL 1.25 MG/1
1.25 MG CAPSULE ORAL
Qty: 8 | Refills: 0 | Status: ACTIVE | COMMUNITY
Start: 2023-01-06 | End: 1900-01-01

## 2023-02-14 ENCOUNTER — APPOINTMENT (OUTPATIENT)
Dept: NEUROLOGY | Facility: CLINIC | Age: 78
End: 2023-02-14

## 2023-02-17 ENCOUNTER — APPOINTMENT (OUTPATIENT)
Dept: ORTHOPEDIC SURGERY | Facility: CLINIC | Age: 78
End: 2023-02-17
Payer: MEDICARE

## 2023-02-17 VITALS — HEART RATE: 65 BPM | SYSTOLIC BLOOD PRESSURE: 154 MMHG | DIASTOLIC BLOOD PRESSURE: 69 MMHG

## 2023-02-17 PROCEDURE — 99214 OFFICE O/P EST MOD 30 MIN: CPT

## 2023-02-17 PROCEDURE — 73630 X-RAY EXAM OF FOOT: CPT | Mod: RT

## 2023-02-27 ENCOUNTER — RX RENEWAL (OUTPATIENT)
Age: 78
End: 2023-02-27

## 2023-03-14 ENCOUNTER — APPOINTMENT (OUTPATIENT)
Dept: OPHTHALMOLOGY | Facility: CLINIC | Age: 78
End: 2023-03-14

## 2023-03-21 ENCOUNTER — APPOINTMENT (OUTPATIENT)
Dept: CARDIOLOGY | Facility: CLINIC | Age: 78
End: 2023-03-21
Payer: MEDICARE

## 2023-03-21 ENCOUNTER — NON-APPOINTMENT (OUTPATIENT)
Age: 78
End: 2023-03-21

## 2023-03-21 VITALS
HEART RATE: 64 BPM | TEMPERATURE: 98 F | HEIGHT: 68 IN | BODY MASS INDEX: 25.61 KG/M2 | OXYGEN SATURATION: 99 % | SYSTOLIC BLOOD PRESSURE: 125 MMHG | WEIGHT: 169 LBS | DIASTOLIC BLOOD PRESSURE: 72 MMHG

## 2023-03-21 DIAGNOSIS — Z87.898 PERSONAL HISTORY OF OTHER SPECIFIED CONDITIONS: ICD-10-CM

## 2023-03-21 PROCEDURE — 93306 TTE W/DOPPLER COMPLETE: CPT

## 2023-03-21 PROCEDURE — 99214 OFFICE O/P EST MOD 30 MIN: CPT

## 2023-03-21 PROCEDURE — 93000 ELECTROCARDIOGRAM COMPLETE: CPT

## 2023-03-21 NOTE — HISTORY OF PRESENT ILLNESS
[FreeTextEntry1] : Clyde is a 77 y.o male w/MHx of Dilated Aorta, Agatston 588, HLD, HTN, DM who presents for routine follow up. Does have episodes of dizziness when getting up from bed, head position changes. Denies spinning room sensation or associated symptoms of hearing loss or HA at the time of dizziness. Does endorse poor PO intake of H2O. Denies hx of vertigo, vertigo with migraine, Meniere's disease.\par The patient is here for follow-up of elevated cholesterol. Currently tolerating prescribed medications. Denies muscle pain, joint pain, back pain, urinary changes, nausea, vomiting, abdominal pain or diarrhea. The patient is trying to follow a low cholesterol diet.\par The patient is here for evaluation of high blood pressure. Currently tolerating antihypertensive medications. Denies headaches, stiff neck, visual changes, or PND. The patient has been trying to stay on a low-sodium diet.\par The patient also presents for continued care of diabetes mellitus. Patient is following recommended diabetic regimen. Tolerating hypoglycemic agents and diet recommendations. Denies visual changes, confusion, palpitations, tremors, diaphoresis, blood in the urine, abdominal pain, nausea, vomiting, myalgias, arthralgias, paresthesia’s and syncope. Too denies any chest discomfort, shortness of breath or new neurologic signs or symptoms. Also denies any polyuria, worsening nocturia, or polydipsia. \par

## 2023-03-21 NOTE — CARDIOLOGY SUMMARY
[de-identified] : 3/21/2023 TTE LVEF 55-60%, Grade I DD, moderate LVH, Prolapse of anterior leaflet w/ trace MR, mild AI. AoA, AoV appear normal in size. [de-identified] : 1/5/2022 CT Heart Calcium score 588. mid AoA 4.0.

## 2023-03-22 LAB — CK SERPL-CCNC: 279 U/L

## 2023-03-24 LAB
ALBUMIN SERPL ELPH-MCNC: 4.6 G/DL
ALP BLD-CCNC: 68 U/L
ALT SERPL-CCNC: 17 U/L
ANION GAP SERPL CALC-SCNC: 13 MMOL/L
AST SERPL-CCNC: 23 U/L
BASOPHILS # BLD AUTO: 0.03 K/UL
BASOPHILS NFR BLD AUTO: 0.4 %
BILIRUB DIRECT SERPL-MCNC: 0.2 MG/DL
BILIRUB INDIRECT SERPL-MCNC: 0.3 MG/DL
BILIRUB SERPL-MCNC: 0.4 MG/DL
BUN SERPL-MCNC: 14 MG/DL
CALCIUM SERPL-MCNC: 9.9 MG/DL
CHLORIDE SERPL-SCNC: 104 MMOL/L
CHOLEST SERPL-MCNC: 126 MG/DL
CO2 SERPL-SCNC: 27 MMOL/L
CREAT SERPL-MCNC: 0.94 MG/DL
EGFR: 85 ML/MIN/1.73M2
EOSINOPHIL # BLD AUTO: 0.18 K/UL
EOSINOPHIL NFR BLD AUTO: 2.5 %
ESTIMATED AVERAGE GLUCOSE: 128 MG/DL
GLUCOSE SERPL-MCNC: 94 MG/DL
HBA1C MFR BLD HPLC: 6.1 %
HCT VFR BLD CALC: 41.8 %
HDLC SERPL-MCNC: 66 MG/DL
HGB BLD-MCNC: 13.4 G/DL
IMM GRANULOCYTES NFR BLD AUTO: 0.1 %
LDLC SERPL CALC-MCNC: 46 MG/DL
LDLC SERPL DIRECT ASSAY-MCNC: 41 MG/DL
LYMPHOCYTES # BLD AUTO: 1.58 K/UL
LYMPHOCYTES NFR BLD AUTO: 22.3 %
MAGNESIUM SERPL-MCNC: 1.8 MG/DL
MAN DIFF?: NORMAL
MCHC RBC-ENTMCNC: 30.1 PG
MCHC RBC-ENTMCNC: 32.1 GM/DL
MCV RBC AUTO: 93.9 FL
MONOCYTES # BLD AUTO: 0.59 K/UL
MONOCYTES NFR BLD AUTO: 8.3 %
NEUTROPHILS # BLD AUTO: 4.7 K/UL
NEUTROPHILS NFR BLD AUTO: 66.4 %
NONHDLC SERPL-MCNC: 60 MG/DL
PHOSPHATE SERPL-MCNC: 3.8 MG/DL
PLATELET # BLD AUTO: 219 K/UL
POTASSIUM SERPL-SCNC: 4.1 MMOL/L
PROT SERPL-MCNC: 7.7 G/DL
RBC # BLD: 4.45 M/UL
RBC # FLD: 14.3 %
SODIUM SERPL-SCNC: 144 MMOL/L
T4 FREE SERPL-MCNC: 1.1 NG/DL
TRIGL SERPL-MCNC: 68 MG/DL
TSH SERPL-ACNC: 2 UIU/ML
WBC # FLD AUTO: 7.09 K/UL

## 2023-03-26 ENCOUNTER — RX RENEWAL (OUTPATIENT)
Age: 78
End: 2023-03-26

## 2023-05-30 ENCOUNTER — NON-APPOINTMENT (OUTPATIENT)
Age: 78
End: 2023-05-30

## 2023-05-30 ENCOUNTER — APPOINTMENT (OUTPATIENT)
Dept: OPHTHALMOLOGY | Facility: CLINIC | Age: 78
End: 2023-05-30
Payer: MEDICARE

## 2023-05-30 PROCEDURE — 92250 FUNDUS PHOTOGRAPHY W/I&R: CPT | Mod: XU

## 2023-05-30 PROCEDURE — 92014 COMPRE OPH EXAM EST PT 1/>: CPT

## 2023-05-30 PROCEDURE — 92134 CPTRZ OPH DX IMG PST SGM RTA: CPT

## 2023-06-21 ENCOUNTER — APPOINTMENT (OUTPATIENT)
Dept: CARDIOLOGY | Facility: CLINIC | Age: 78
End: 2023-06-21
Payer: MEDICARE

## 2023-06-21 ENCOUNTER — NON-APPOINTMENT (OUTPATIENT)
Age: 78
End: 2023-06-21

## 2023-06-21 VITALS
WEIGHT: 168.8 LBS | HEART RATE: 81 BPM | SYSTOLIC BLOOD PRESSURE: 150 MMHG | OXYGEN SATURATION: 98 % | DIASTOLIC BLOOD PRESSURE: 60 MMHG | HEIGHT: 68 IN | TEMPERATURE: 97.6 F | BODY MASS INDEX: 25.58 KG/M2

## 2023-06-21 DIAGNOSIS — H91.92 UNSPECIFIED HEARING LOSS, LEFT EAR: ICD-10-CM

## 2023-06-21 DIAGNOSIS — R41.3 OTHER AMNESIA: ICD-10-CM

## 2023-06-21 DIAGNOSIS — M25.562 PAIN IN LEFT KNEE: ICD-10-CM

## 2023-06-21 PROCEDURE — 99214 OFFICE O/P EST MOD 30 MIN: CPT

## 2023-06-21 PROCEDURE — 93000 ELECTROCARDIOGRAM COMPLETE: CPT

## 2023-06-21 RX ORDER — AMLODIPINE BESYLATE 5 MG/1
5 TABLET ORAL DAILY
Qty: 90 | Refills: 0 | Status: DISCONTINUED | COMMUNITY
Start: 2021-12-08 | End: 2023-06-21

## 2023-06-21 RX ORDER — HYDROCHLOROTHIAZIDE 12.5 MG/1
12.5 CAPSULE ORAL
Qty: 45 | Refills: 1 | Status: ACTIVE | COMMUNITY
Start: 2023-06-21

## 2023-06-22 LAB
ALBUMIN SERPL ELPH-MCNC: 4.6 G/DL
ALP BLD-CCNC: 75 U/L
ALT SERPL-CCNC: 19 U/L
ANION GAP SERPL CALC-SCNC: 12 MMOL/L
AST SERPL-CCNC: 21 U/L
BILIRUB DIRECT SERPL-MCNC: 0.1 MG/DL
BILIRUB INDIRECT SERPL-MCNC: 0.2 MG/DL
BILIRUB SERPL-MCNC: 0.3 MG/DL
BUN SERPL-MCNC: 15 MG/DL
CALCIUM SERPL-MCNC: 9.8 MG/DL
CHLORIDE SERPL-SCNC: 106 MMOL/L
CHOLEST SERPL-MCNC: 121 MG/DL
CK SERPL-CCNC: 257 U/L
CO2 SERPL-SCNC: 26 MMOL/L
CREAT SERPL-MCNC: 0.95 MG/DL
CRP SERPL HS-MCNC: 0.42 MG/L
EGFR: 83 ML/MIN/1.73M2
ESTIMATED AVERAGE GLUCOSE: 123 MG/DL
GLUCOSE SERPL-MCNC: 113 MG/DL
HBA1C MFR BLD HPLC: 5.9 %
HDLC SERPL-MCNC: 59 MG/DL
LDLC SERPL CALC-MCNC: 47 MG/DL
LDLC SERPL DIRECT ASSAY-MCNC: 45 MG/DL
NONHDLC SERPL-MCNC: 62 MG/DL
POTASSIUM SERPL-SCNC: 4 MMOL/L
PROT SERPL-MCNC: 7.1 G/DL
SODIUM SERPL-SCNC: 144 MMOL/L
TRIGL SERPL-MCNC: 75 MG/DL

## 2023-06-23 ENCOUNTER — RX RENEWAL (OUTPATIENT)
Age: 78
End: 2023-06-23

## 2023-06-23 NOTE — HISTORY OF PRESENT ILLNESS
[FreeTextEntry1] : Clyde is a 77 y.o male w/MHx of Dilated Aorta, Agatston 588, HLD, HTN, DM who presents for routine follow up.\par \par  Does have episodes of dizziness when getting up from bed, head position changes. Denies spinning room sensation or associated symptoms of hearing loss or HA at the time of dizziness. Does endorse poor PO intake of H2O. Denies hx of vertigo, vertigo with migraine, Meniere's disease.\par \par The patient is here for follow-up of elevated cholesterol. Currently tolerating prescribed medications. Denies muscle pain, joint pain, back pain, urinary changes, nausea, vomiting, abdominal pain or diarrhea. The patient is trying to follow a low cholesterol diet.\par \par The patient is here for evaluation of high blood pressure. Currently tolerating antihypertensive medications. Denies headaches, stiff neck, visual changes, or PND. The patient has been trying to stay on a low-sodium diet.\par \par The patient also presents for continued care of diabetes mellitus. Patient is following recommended diabetic regimen. Tolerating hypoglycemic agents and diet recommendations.\par \par  Denies visual changes, confusion, palpitations, tremors, diaphoresis, blood in the urine, abdominal pain, nausea, vomiting, myalgias, arthralgias, paresthesia’s and syncope. Too denies any chest discomfort, shortness of breath or new neurologic signs or symptoms. Also denies any polyuria, worsening nocturia, or polydipsia. \par \par Pt fractured left toe fractured. \par

## 2023-08-02 DIAGNOSIS — R09.89 OTHER SPECIFIED SYMPTOMS AND SIGNS INVOLVING THE CIRCULATORY AND RESPIRATORY SYSTEMS: ICD-10-CM

## 2023-08-03 ENCOUNTER — APPOINTMENT (OUTPATIENT)
Dept: CARDIOLOGY | Facility: CLINIC | Age: 78
End: 2023-08-03
Payer: MEDICARE

## 2023-08-03 VITALS
TEMPERATURE: 97.8 F | HEIGHT: 68 IN | DIASTOLIC BLOOD PRESSURE: 70 MMHG | SYSTOLIC BLOOD PRESSURE: 122 MMHG | HEART RATE: 61 BPM | OXYGEN SATURATION: 99 %

## 2023-08-03 DIAGNOSIS — R93.1 ABNORMAL FINDINGS ON DIAGNOSTIC IMAGING OF HEART AND CORONARY CIRCULATION: ICD-10-CM

## 2023-08-03 DIAGNOSIS — I71.20 THORACIC AORTIC ANEURYSM, WITHOUT RUPTURE, UNSPECIFIED: ICD-10-CM

## 2023-08-03 DIAGNOSIS — E11.9 TYPE 2 DIABETES MELLITUS W/OUT COMPLICATIONS: ICD-10-CM

## 2023-08-03 DIAGNOSIS — I25.10 ATHEROSCLEROTIC HEART DISEASE OF NATIVE CORONARY ARTERY W/OUT ANGINA PECTORIS: ICD-10-CM

## 2023-08-03 PROCEDURE — 99214 OFFICE O/P EST MOD 30 MIN: CPT

## 2023-08-03 PROCEDURE — 93000 ELECTROCARDIOGRAM COMPLETE: CPT

## 2023-08-03 PROCEDURE — 93880 EXTRACRANIAL BILAT STUDY: CPT

## 2023-08-04 LAB
ALBUMIN SERPL ELPH-MCNC: 4.4 G/DL
ALP BLD-CCNC: 67 U/L
ALT SERPL-CCNC: 24 U/L
ANION GAP SERPL CALC-SCNC: 14 MMOL/L
APO B SERPL-MCNC: 58 MG/DL
AST SERPL-CCNC: 25 U/L
BILIRUB DIRECT SERPL-MCNC: 0.1 MG/DL
BILIRUB INDIRECT SERPL-MCNC: 0.2 MG/DL
BILIRUB SERPL-MCNC: 0.4 MG/DL
BUN SERPL-MCNC: 14 MG/DL
CALCIUM SERPL-MCNC: 9.6 MG/DL
CHLORIDE SERPL-SCNC: 106 MMOL/L
CHOLEST SERPL-MCNC: 133 MG/DL
CK SERPL-CCNC: 325 U/L
CO2 SERPL-SCNC: 24 MMOL/L
CREAT SERPL-MCNC: 0.95 MG/DL
CRP SERPL HS-MCNC: 0.18 MG/L
EGFR: 83 ML/MIN/1.73M2
ESTIMATED AVERAGE GLUCOSE: 123 MG/DL
GLUCOSE SERPL-MCNC: 112 MG/DL
HBA1C MFR BLD HPLC: 5.9 %
HCT VFR BLD CALC: 40.9 %
HDLC SERPL-MCNC: 60 MG/DL
HGB BLD-MCNC: 13.2 G/DL
LDLC SERPL CALC-MCNC: 58 MG/DL
LDLC SERPL DIRECT ASSAY-MCNC: 57 MG/DL
MCHC RBC-ENTMCNC: 29.5 PG
MCHC RBC-ENTMCNC: 32.3 GM/DL
MCV RBC AUTO: 91.3 FL
NONHDLC SERPL-MCNC: 72 MG/DL
PLATELET # BLD AUTO: 206 K/UL
POTASSIUM SERPL-SCNC: 4 MMOL/L
PROT SERPL-MCNC: 7.5 G/DL
RBC # BLD: 4.48 M/UL
RBC # FLD: 14.2 %
SODIUM SERPL-SCNC: 143 MMOL/L
TRIGL SERPL-MCNC: 70 MG/DL
WBC # FLD AUTO: 6.08 K/UL

## 2023-08-04 NOTE — HISTORY OF PRESENT ILLNESS
[FreeTextEntry1] : CLEM TAO  is a 77 year old M w/ pmhx of CAD, HLD, HTN, DM2, TKR x 2 who presents today for DCR and follow up. The patient denies fever, chills, sore throat, loss of taste or smell, muscle aches weight loss, malaise, rash, recent travel, insect bites, alteration bowel habits, headaches, weakness, abdominal  pain, bloating, changes in urination, visual disturbances, shortness of breath, chest pain, dizziness, palpitations.  The patient presents for routine follow up of their coronary artery disease.   The patient has been following all secondary prevents measures and antiplatelet therapy. The patient denies shortness of breath, chest pain, dizziness, palpitations.  The patient is here for follow-up of elevated cholesterol. Patient is currently tolerating medication and denies muscle pain, joint pain, back pain,  urinary changes , nausea, vomiting, abdominal pain or diarrhea. The patient is trying to follow a low cholesterol diet.  The patient here for evaluation of high blood pressure. Patient is currently tolerating the current antihypertensive regime and they deny headaches, stiff neck, visual changes, or PND. The patient has been trying to stay on a low-sodium diet.  The patient also presents for continued care of diabetes mellitus. Patient is following the diabetic regimen. Patient is tolerating hypoglycemic agents and following the diet. Patient denies any visual changes, blood in the urine, abdominal pain, nausea, vomiting, myalgias, arthralgias, paresthesias and syncope. The patient denies any chest discomfort, shortness of breath or new neurologic signs or symptoms. Patient denies any polyuria, worsening nocturia, or polydipsia.

## 2023-08-04 NOTE — PHYSICAL EXAM
[Well Developed] : well developed [Well Nourished] : well nourished [No Acute Distress] : no acute distress [Normal Conjunctiva] : normal conjunctiva [Normal Venous Pressure] : normal venous pressure [No Carotid Bruit] : no carotid bruit [Normal S1, S2] : normal S1, S2 [No Murmur] : no murmur [No Rub] : no rub [No Gallop] : no gallop [Clear Lung Fields] : clear lung fields [Good Air Entry] : good air entry [No Respiratory Distress] : no respiratory distress  [Soft] : abdomen soft [Non Tender] : non-tender [No Masses/organomegaly] : no masses/organomegaly [Normal Bowel Sounds] : normal bowel sounds [Normal Gait] : normal gait [No Edema] : no edema [No Cyanosis] : no cyanosis [No Clubbing] : no clubbing [No Varicosities] : no varicosities [No Rash] : no rash [No Skin Lesions] : no skin lesions [Moves all extremities] : moves all extremities [No Focal Deficits] : no focal deficits [Normal Speech] : normal speech [Alert and Oriented] : alert and oriented [Normal memory] : normal memory [de-identified] : Regular rate and rhythm, NL S1, S2, non-displaced PMI, chest non-tender; no rubs,heaves  or gallops a  Grade 1/6 systolic murmur noted at the LSB

## 2023-10-12 ENCOUNTER — EMERGENCY (EMERGENCY)
Facility: HOSPITAL | Age: 78
LOS: 1 days | Discharge: HOME | End: 2023-10-12
Attending: STUDENT IN AN ORGANIZED HEALTH CARE EDUCATION/TRAINING PROGRAM
Payer: MEDICARE

## 2023-10-12 ENCOUNTER — APPOINTMENT (OUTPATIENT)
Dept: INTERNAL MEDICINE | Facility: CLINIC | Age: 78
End: 2023-10-12
Payer: MEDICARE

## 2023-10-12 ENCOUNTER — NON-APPOINTMENT (OUTPATIENT)
Age: 78
End: 2023-10-12

## 2023-10-12 VITALS
OXYGEN SATURATION: 99 % | WEIGHT: 168 LBS | HEART RATE: 65 BPM | HEIGHT: 68 IN | TEMPERATURE: 98.4 F | BODY MASS INDEX: 25.46 KG/M2 | SYSTOLIC BLOOD PRESSURE: 140 MMHG | DIASTOLIC BLOOD PRESSURE: 80 MMHG

## 2023-10-12 VITALS
RESPIRATION RATE: 18 BRPM | SYSTOLIC BLOOD PRESSURE: 160 MMHG | OXYGEN SATURATION: 99 % | DIASTOLIC BLOOD PRESSURE: 76 MMHG | HEART RATE: 61 BPM | TEMPERATURE: 98 F

## 2023-10-12 VITALS
TEMPERATURE: 98 F | HEART RATE: 77 BPM | SYSTOLIC BLOOD PRESSURE: 168 MMHG | OXYGEN SATURATION: 98 % | RESPIRATION RATE: 18 BRPM | DIASTOLIC BLOOD PRESSURE: 67 MMHG

## 2023-10-12 DIAGNOSIS — R42 DIZZINESS AND GIDDINESS: ICD-10-CM

## 2023-10-12 DIAGNOSIS — I10 ESSENTIAL (PRIMARY) HYPERTENSION: ICD-10-CM

## 2023-10-12 DIAGNOSIS — Z96.659 PRESENCE OF UNSPECIFIED ARTIFICIAL KNEE JOINT: Chronic | ICD-10-CM

## 2023-10-12 DIAGNOSIS — E78.5 HYPERLIPIDEMIA, UNSPECIFIED: ICD-10-CM

## 2023-10-12 DIAGNOSIS — Z96.652 PRESENCE OF LEFT ARTIFICIAL KNEE JOINT: Chronic | ICD-10-CM

## 2023-10-12 LAB
ALBUMIN SERPL ELPH-MCNC: 4.5 G/DL — SIGNIFICANT CHANGE UP (ref 3.3–5)
ALBUMIN SERPL ELPH-MCNC: 4.5 G/DL — SIGNIFICANT CHANGE UP (ref 3.3–5)
ALP SERPL-CCNC: 71 U/L — SIGNIFICANT CHANGE UP (ref 40–120)
ALP SERPL-CCNC: 71 U/L — SIGNIFICANT CHANGE UP (ref 40–120)
ALT FLD-CCNC: 27 U/L — SIGNIFICANT CHANGE UP (ref 10–45)
ALT FLD-CCNC: 27 U/L — SIGNIFICANT CHANGE UP (ref 10–45)
ANION GAP SERPL CALC-SCNC: 11 MMOL/L — SIGNIFICANT CHANGE UP (ref 5–17)
ANION GAP SERPL CALC-SCNC: 11 MMOL/L — SIGNIFICANT CHANGE UP (ref 5–17)
APPEARANCE UR: CLEAR — SIGNIFICANT CHANGE UP
APTT BLD: 31.8 SEC — SIGNIFICANT CHANGE UP (ref 24.5–35.6)
APTT BLD: 31.8 SEC — SIGNIFICANT CHANGE UP (ref 24.5–35.6)
AST SERPL-CCNC: 25 U/L — SIGNIFICANT CHANGE UP (ref 10–40)
AST SERPL-CCNC: 25 U/L — SIGNIFICANT CHANGE UP (ref 10–40)
BACTERIA # UR AUTO: NEGATIVE — SIGNIFICANT CHANGE UP
BASOPHILS # BLD AUTO: 0.02 K/UL — SIGNIFICANT CHANGE UP (ref 0–0.2)
BASOPHILS # BLD AUTO: 0.02 K/UL — SIGNIFICANT CHANGE UP (ref 0–0.2)
BASOPHILS NFR BLD AUTO: 0.3 % — SIGNIFICANT CHANGE UP (ref 0–2)
BASOPHILS NFR BLD AUTO: 0.3 % — SIGNIFICANT CHANGE UP (ref 0–2)
BILIRUB SERPL-MCNC: 0.3 MG/DL — SIGNIFICANT CHANGE UP (ref 0.2–1.2)
BILIRUB SERPL-MCNC: 0.3 MG/DL — SIGNIFICANT CHANGE UP (ref 0.2–1.2)
BILIRUB UR-MCNC: NEGATIVE — SIGNIFICANT CHANGE UP
BUN SERPL-MCNC: 18 MG/DL — SIGNIFICANT CHANGE UP (ref 7–23)
BUN SERPL-MCNC: 18 MG/DL — SIGNIFICANT CHANGE UP (ref 7–23)
CALCIUM SERPL-MCNC: 9.8 MG/DL — SIGNIFICANT CHANGE UP (ref 8.4–10.5)
CALCIUM SERPL-MCNC: 9.8 MG/DL — SIGNIFICANT CHANGE UP (ref 8.4–10.5)
CHLORIDE SERPL-SCNC: 104 MMOL/L — SIGNIFICANT CHANGE UP (ref 96–108)
CHLORIDE SERPL-SCNC: 104 MMOL/L — SIGNIFICANT CHANGE UP (ref 96–108)
CO2 SERPL-SCNC: 25 MMOL/L — SIGNIFICANT CHANGE UP (ref 22–31)
CO2 SERPL-SCNC: 25 MMOL/L — SIGNIFICANT CHANGE UP (ref 22–31)
COLOR SPEC: SIGNIFICANT CHANGE UP
CREAT SERPL-MCNC: 0.88 MG/DL — SIGNIFICANT CHANGE UP (ref 0.5–1.3)
CREAT SERPL-MCNC: 0.88 MG/DL — SIGNIFICANT CHANGE UP (ref 0.5–1.3)
DIFF PNL FLD: NEGATIVE — SIGNIFICANT CHANGE UP
EGFR: 89 ML/MIN/1.73M2 — SIGNIFICANT CHANGE UP
EGFR: 89 ML/MIN/1.73M2 — SIGNIFICANT CHANGE UP
EOSINOPHIL # BLD AUTO: 0.02 K/UL — SIGNIFICANT CHANGE UP (ref 0–0.5)
EOSINOPHIL # BLD AUTO: 0.02 K/UL — SIGNIFICANT CHANGE UP (ref 0–0.5)
EOSINOPHIL NFR BLD AUTO: 0.3 % — SIGNIFICANT CHANGE UP (ref 0–6)
EOSINOPHIL NFR BLD AUTO: 0.3 % — SIGNIFICANT CHANGE UP (ref 0–6)
EPI CELLS # UR: 0 /HPF — SIGNIFICANT CHANGE UP
GLUCOSE SERPL-MCNC: 120 MG/DL — HIGH (ref 70–99)
GLUCOSE SERPL-MCNC: 120 MG/DL — HIGH (ref 70–99)
GLUCOSE UR QL: NEGATIVE — SIGNIFICANT CHANGE UP
HCT VFR BLD CALC: 40.4 % — SIGNIFICANT CHANGE UP (ref 39–50)
HCT VFR BLD CALC: 40.4 % — SIGNIFICANT CHANGE UP (ref 39–50)
HGB BLD-MCNC: 13.3 G/DL — SIGNIFICANT CHANGE UP (ref 13–17)
HGB BLD-MCNC: 13.3 G/DL — SIGNIFICANT CHANGE UP (ref 13–17)
IMM GRANULOCYTES NFR BLD AUTO: 0.4 % — SIGNIFICANT CHANGE UP (ref 0–0.9)
IMM GRANULOCYTES NFR BLD AUTO: 0.4 % — SIGNIFICANT CHANGE UP (ref 0–0.9)
INR BLD: 1.04 RATIO — SIGNIFICANT CHANGE UP (ref 0.85–1.18)
INR BLD: 1.04 RATIO — SIGNIFICANT CHANGE UP (ref 0.85–1.18)
KETONES UR-MCNC: NEGATIVE — SIGNIFICANT CHANGE UP
LEUKOCYTE ESTERASE UR-ACNC: NEGATIVE — SIGNIFICANT CHANGE UP
LYMPHOCYTES # BLD AUTO: 0.77 K/UL — LOW (ref 1–3.3)
LYMPHOCYTES # BLD AUTO: 0.77 K/UL — LOW (ref 1–3.3)
LYMPHOCYTES # BLD AUTO: 10.2 % — LOW (ref 13–44)
LYMPHOCYTES # BLD AUTO: 10.2 % — LOW (ref 13–44)
MAGNESIUM SERPL-MCNC: 2.2 MG/DL — SIGNIFICANT CHANGE UP (ref 1.6–2.6)
MAGNESIUM SERPL-MCNC: 2.2 MG/DL — SIGNIFICANT CHANGE UP (ref 1.6–2.6)
MCHC RBC-ENTMCNC: 30.1 PG — SIGNIFICANT CHANGE UP (ref 27–34)
MCHC RBC-ENTMCNC: 30.1 PG — SIGNIFICANT CHANGE UP (ref 27–34)
MCHC RBC-ENTMCNC: 32.9 GM/DL — SIGNIFICANT CHANGE UP (ref 32–36)
MCHC RBC-ENTMCNC: 32.9 GM/DL — SIGNIFICANT CHANGE UP (ref 32–36)
MCV RBC AUTO: 91.4 FL — SIGNIFICANT CHANGE UP (ref 80–100)
MCV RBC AUTO: 91.4 FL — SIGNIFICANT CHANGE UP (ref 80–100)
MONOCYTES # BLD AUTO: 0.34 K/UL — SIGNIFICANT CHANGE UP (ref 0–0.9)
MONOCYTES # BLD AUTO: 0.34 K/UL — SIGNIFICANT CHANGE UP (ref 0–0.9)
MONOCYTES NFR BLD AUTO: 4.5 % — SIGNIFICANT CHANGE UP (ref 2–14)
MONOCYTES NFR BLD AUTO: 4.5 % — SIGNIFICANT CHANGE UP (ref 2–14)
NEUTROPHILS # BLD AUTO: 6.37 K/UL — SIGNIFICANT CHANGE UP (ref 1.8–7.4)
NEUTROPHILS # BLD AUTO: 6.37 K/UL — SIGNIFICANT CHANGE UP (ref 1.8–7.4)
NEUTROPHILS NFR BLD AUTO: 84.3 % — HIGH (ref 43–77)
NEUTROPHILS NFR BLD AUTO: 84.3 % — HIGH (ref 43–77)
NITRITE UR-MCNC: NEGATIVE — SIGNIFICANT CHANGE UP
NRBC # BLD: 0 /100 WBCS — SIGNIFICANT CHANGE UP (ref 0–0)
NRBC # BLD: 0 /100 WBCS — SIGNIFICANT CHANGE UP (ref 0–0)
PH UR: 7.5 — SIGNIFICANT CHANGE UP (ref 5–8)
PHOSPHATE SERPL-MCNC: 2.5 MG/DL — SIGNIFICANT CHANGE UP (ref 2.5–4.5)
PHOSPHATE SERPL-MCNC: 2.5 MG/DL — SIGNIFICANT CHANGE UP (ref 2.5–4.5)
PLATELET # BLD AUTO: 225 K/UL — SIGNIFICANT CHANGE UP (ref 150–400)
PLATELET # BLD AUTO: 225 K/UL — SIGNIFICANT CHANGE UP (ref 150–400)
POTASSIUM SERPL-MCNC: 4.5 MMOL/L — SIGNIFICANT CHANGE UP (ref 3.5–5.3)
POTASSIUM SERPL-MCNC: 4.5 MMOL/L — SIGNIFICANT CHANGE UP (ref 3.5–5.3)
POTASSIUM SERPL-SCNC: 4.5 MMOL/L — SIGNIFICANT CHANGE UP (ref 3.5–5.3)
POTASSIUM SERPL-SCNC: 4.5 MMOL/L — SIGNIFICANT CHANGE UP (ref 3.5–5.3)
PROT SERPL-MCNC: 7.6 G/DL — SIGNIFICANT CHANGE UP (ref 6–8.3)
PROT SERPL-MCNC: 7.6 G/DL — SIGNIFICANT CHANGE UP (ref 6–8.3)
PROT UR-MCNC: NEGATIVE — SIGNIFICANT CHANGE UP
PROTHROM AB SERPL-ACNC: 10.9 SEC — SIGNIFICANT CHANGE UP (ref 9.5–13)
PROTHROM AB SERPL-ACNC: 10.9 SEC — SIGNIFICANT CHANGE UP (ref 9.5–13)
RBC # BLD: 4.42 M/UL — SIGNIFICANT CHANGE UP (ref 4.2–5.8)
RBC # BLD: 4.42 M/UL — SIGNIFICANT CHANGE UP (ref 4.2–5.8)
RBC # FLD: 14.1 % — SIGNIFICANT CHANGE UP (ref 10.3–14.5)
RBC # FLD: 14.1 % — SIGNIFICANT CHANGE UP (ref 10.3–14.5)
RBC CASTS # UR COMP ASSIST: 1 /HPF — SIGNIFICANT CHANGE UP (ref 0–4)
SODIUM SERPL-SCNC: 140 MMOL/L — SIGNIFICANT CHANGE UP (ref 135–145)
SODIUM SERPL-SCNC: 140 MMOL/L — SIGNIFICANT CHANGE UP (ref 135–145)
SP GR SPEC: 1.02 — SIGNIFICANT CHANGE UP (ref 1.01–1.02)
TROPONIN T, HIGH SENSITIVITY RESULT: 29 NG/L — SIGNIFICANT CHANGE UP (ref 0–51)
TROPONIN T, HIGH SENSITIVITY RESULT: 29 NG/L — SIGNIFICANT CHANGE UP (ref 0–51)
TROPONIN T, HIGH SENSITIVITY RESULT: 33 NG/L — SIGNIFICANT CHANGE UP (ref 0–51)
TROPONIN T, HIGH SENSITIVITY RESULT: 33 NG/L — SIGNIFICANT CHANGE UP (ref 0–51)
UROBILINOGEN FLD QL: NEGATIVE — SIGNIFICANT CHANGE UP
WBC # BLD: 7.55 K/UL — SIGNIFICANT CHANGE UP (ref 3.8–10.5)
WBC # BLD: 7.55 K/UL — SIGNIFICANT CHANGE UP (ref 3.8–10.5)
WBC # FLD AUTO: 7.55 K/UL — SIGNIFICANT CHANGE UP (ref 3.8–10.5)
WBC # FLD AUTO: 7.55 K/UL — SIGNIFICANT CHANGE UP (ref 3.8–10.5)
WBC UR QL: 0 /HPF — SIGNIFICANT CHANGE UP (ref 0–5)

## 2023-10-12 PROCEDURE — 84100 ASSAY OF PHOSPHORUS: CPT

## 2023-10-12 PROCEDURE — 84132 ASSAY OF SERUM POTASSIUM: CPT

## 2023-10-12 PROCEDURE — 84295 ASSAY OF SERUM SODIUM: CPT

## 2023-10-12 PROCEDURE — 85730 THROMBOPLASTIN TIME PARTIAL: CPT

## 2023-10-12 PROCEDURE — 82435 ASSAY OF BLOOD CHLORIDE: CPT

## 2023-10-12 PROCEDURE — 82947 ASSAY GLUCOSE BLOOD QUANT: CPT

## 2023-10-12 PROCEDURE — 74174 CTA ABD&PLVS W/CONTRAST: CPT | Mod: MD

## 2023-10-12 PROCEDURE — 70498 CT ANGIOGRAPHY NECK: CPT | Mod: 26,MA

## 2023-10-12 PROCEDURE — 99285 EMERGENCY DEPT VISIT HI MDM: CPT | Mod: 25

## 2023-10-12 PROCEDURE — 82803 BLOOD GASES ANY COMBINATION: CPT

## 2023-10-12 PROCEDURE — 99204 OFFICE O/P NEW MOD 45 MIN: CPT

## 2023-10-12 PROCEDURE — 93005 ELECTROCARDIOGRAM TRACING: CPT

## 2023-10-12 PROCEDURE — 70496 CT ANGIOGRAPHY HEAD: CPT | Mod: 26,MA

## 2023-10-12 PROCEDURE — 70498 CT ANGIOGRAPHY NECK: CPT | Mod: MA

## 2023-10-12 PROCEDURE — 70450 CT HEAD/BRAIN W/O DYE: CPT | Mod: 26,MA,XU

## 2023-10-12 PROCEDURE — 83605 ASSAY OF LACTIC ACID: CPT

## 2023-10-12 PROCEDURE — 70496 CT ANGIOGRAPHY HEAD: CPT | Mod: MA

## 2023-10-12 PROCEDURE — 85018 HEMOGLOBIN: CPT

## 2023-10-12 PROCEDURE — 74174 CTA ABD&PLVS W/CONTRAST: CPT | Mod: 26,MD

## 2023-10-12 PROCEDURE — 82330 ASSAY OF CALCIUM: CPT

## 2023-10-12 PROCEDURE — 71046 X-RAY EXAM CHEST 2 VIEWS: CPT

## 2023-10-12 PROCEDURE — 85025 COMPLETE CBC W/AUTO DIFF WBC: CPT

## 2023-10-12 PROCEDURE — 83735 ASSAY OF MAGNESIUM: CPT

## 2023-10-12 PROCEDURE — 80053 COMPREHEN METABOLIC PANEL: CPT

## 2023-10-12 PROCEDURE — 71275 CT ANGIOGRAPHY CHEST: CPT | Mod: MD

## 2023-10-12 PROCEDURE — 84484 ASSAY OF TROPONIN QUANT: CPT

## 2023-10-12 PROCEDURE — 70450 CT HEAD/BRAIN W/O DYE: CPT | Mod: MA

## 2023-10-12 PROCEDURE — 96374 THER/PROPH/DIAG INJ IV PUSH: CPT | Mod: XU

## 2023-10-12 PROCEDURE — 71046 X-RAY EXAM CHEST 2 VIEWS: CPT | Mod: 26

## 2023-10-12 PROCEDURE — 85610 PROTHROMBIN TIME: CPT

## 2023-10-12 PROCEDURE — 85014 HEMATOCRIT: CPT

## 2023-10-12 PROCEDURE — 71275 CT ANGIOGRAPHY CHEST: CPT | Mod: 26,MD

## 2023-10-12 PROCEDURE — 87086 URINE CULTURE/COLONY COUNT: CPT

## 2023-10-12 PROCEDURE — 99285 EMERGENCY DEPT VISIT HI MDM: CPT | Mod: GC

## 2023-10-12 PROCEDURE — 93000 ELECTROCARDIOGRAM COMPLETE: CPT

## 2023-10-12 PROCEDURE — 81001 URINALYSIS AUTO W/SCOPE: CPT

## 2023-10-12 RX ORDER — MECLIZINE HCL 12.5 MG
1 TABLET ORAL
Qty: 6 | Refills: 0
Start: 2023-10-12 | End: 2023-10-14

## 2023-10-12 RX ORDER — ONDANSETRON 8 MG/1
4 TABLET, FILM COATED ORAL ONCE
Refills: 0 | Status: COMPLETED | OUTPATIENT
Start: 2023-10-12 | End: 2023-10-12

## 2023-10-12 RX ORDER — DIAZEPAM 5 MG
1 TABLET ORAL
Qty: 9 | Refills: 0
Start: 2023-10-12 | End: 2023-10-14

## 2023-10-12 RX ORDER — SODIUM CHLORIDE 9 MG/ML
1000 INJECTION, SOLUTION INTRAVENOUS ONCE
Refills: 0 | Status: COMPLETED | OUTPATIENT
Start: 2023-10-12 | End: 2023-10-12

## 2023-10-12 RX ORDER — ONDANSETRON 8 MG/1
1 TABLET, FILM COATED ORAL
Qty: 9 | Refills: 0
Start: 2023-10-12 | End: 2023-10-14

## 2023-10-12 RX ADMIN — ONDANSETRON 4 MILLIGRAM(S): 8 TABLET, FILM COATED ORAL at 12:30

## 2023-10-12 RX ADMIN — SODIUM CHLORIDE 1000 MILLILITER(S): 9 INJECTION, SOLUTION INTRAVENOUS at 12:30

## 2023-10-12 NOTE — ED ADULT NURSE NOTE - OBJECTIVE STATEMENT
Patient  is alert  and  oriented  x4. Color is good and skin warm to touch.  He  is c/o dizziness, nausea and  vomiting. He  denies chest pain or any other discomfort.

## 2023-10-12 NOTE — CONSULT NOTE ADULT - SUBJECTIVE AND OBJECTIVE BOX
Neurology - Consult Note    -  Spectra: [phone] ([facility]), [phone] ([facility])  -    HPI: CLEM TAO, 78y (1945) M/F w/ PMHx significant for ***      Review of Systems:  INCOMPLETE   All other review of systems is negative unless indicated above.    Allergies:  No Known Allergies  ACE inhibitors (Other (Mild to Mod))      PMHx/PSHx/Family Hx: As above, otherwise see below   Hypertension    Hyperlipidemia    Cardiomyopathy    Diabetes    Osteoarthritis        Social Hx:  No current use of tobacco, alcohol, or illicit drugs  Lives with ***    Medications:  MEDICATIONS  (STANDING):    MEDICATIONS  (PRN):      Vitals:  T(C): 36.6 (10-12-23 @ 13:24), Max: 36.8 (10-12-23 @ 11:30)  HR: 83 (10-12-23 @ 15:34) (59 - 83)  BP: 142/70 (10-12-23 @ 15:34) (142/70 - 163/74)  RR: 19 (10-12-23 @ 15:34) (18 - 19)  SpO2: 100% (10-12-23 @ 15:34) (99% - 100%)    Physical Examination: INCOMPLETE  General - NAD  Cardiovascular - Peripheral pulses palpable, no edema  Eyes - Fundoscopy with flat, sharp optic discs and no hemorrhage or exudates; Fundoscopy not well visualized; Fundoscopy not performed due to safety precautions in the setting of infection risk    Neurologic Exam:  Mental status - Awake, Alert, Oriented to person, place, and time. Speech fluent, repetition and naming intact. Follows simple and complex commands. Attention/concentration, recent and remote memory (including registration and recall), and fund of knowledge intact    Cranial nerves - PERRLA, VFF, EOMI, face sensation (V1-V3) intact b/l, facial strength intact without asymmetry b/l, hearing intact b/l, palate with symmetric elevation, trapezius OR sternocleidomastoid 5/5 strength b/l, tongue midline on protrusion with full lateral movement    Motor - Normal bulk and tone throughout. No pronator drift.  Strength testing            R        Deltoid:  5    Biceps:  5    Triceps:  5    Wrist Extension:  5    Wrist Flexion:  5    Interossei:  5    :  5    Hip Flexion:  5    Hip Extension:  5    Knee Flexion:  5    Knee Extension:  5    Dorsiflexion:  5    Plantar Flexion:  5        L        Deltoid:  5    Biceps:  5    Triceps:  5    Wrist Extension:  5    Wrist Flexion:  5    Interossei:  5    :  5    Hip Flexion:  5    Hip Extension:  5    Knee Flexion:  5    Knee Extension:  5    Dorsiflexion:  5    Plantar Flexion:  5      Sensation - Light touch/temperature OR pain/vibration intact throughout    DTR's -               R  Biceps:  2+    Triceps:  2+    Brachioradialis:  2+    Patellar:  2+    Ankle:  2+    Toes/plantar response:  Down    L  Biceps:  2+    Triceps:  2+    Brachioradialis:  2+    Patellar:  2+    Ankle:  2+    Toes/plantar response:  Down    Coordination - Finger to Nose intact b/l. No tremors appreciated    Gait and station - Normal casual gait. Romberg (-)    Labs:                        13.3   7.55  )-----------( 225      ( 12 Oct 2023 12:44 )             40.4     10-12    140  |  104  |  18  ----------------------------<  120<H>  4.5   |  25  |  0.88    Ca    9.8      12 Oct 2023 12:44  Phos  2.5     10-12  Mg     2.2     10-12    TPro  7.6  /  Alb  4.5  /  TBili  0.3  /  DBili  x   /  AST  25  /  ALT  27  /  AlkPhos  71  10-12    CAPILLARY BLOOD GLUCOSE        LIVER FUNCTIONS - ( 12 Oct 2023 12:44 )  Alb: 4.5 g/dL / Pro: 7.6 g/dL / ALK PHOS: 71 U/L / ALT: 27 U/L / AST: 25 U/L / GGT: x             PT/INR - ( 12 Oct 2023 12:44 )   PT: 10.9 sec;   INR: 1.04 ratio         PTT - ( 12 Oct 2023 12:44 )  PTT:31.8 sec  CSF:                  Radiology:  CT Head No Cont:  (12 Oct 2023 14:25)     Neurology - Consult Note    -  Spectra: [phone] ([facility]), [phone] ([facility])  -    HPI: CLEM TAO, 78y (1945) M/F w/ PMHx significant for ***      Review of Systems:  INCOMPLETE   All other review of systems is negative unless indicated above.    Allergies:  No Known Allergies  ACE inhibitors (Other (Mild to Mod))      PMHx/PSHx/Family Hx: As above, otherwise see below   Hypertension    Hyperlipidemia    Cardiomyopathy    Diabetes    Osteoarthritis        Social Hx:  No current use of tobacco, alcohol, or illicit drugs  Lives with ***    Medications:  MEDICATIONS  (STANDING):    MEDICATIONS  (PRN):      Vitals:  T(C): 36.6 (10-12-23 @ 13:24), Max: 36.8 (10-12-23 @ 11:30)  HR: 83 (10-12-23 @ 15:34) (59 - 83)  BP: 142/70 (10-12-23 @ 15:34) (142/70 - 163/74)  RR: 19 (10-12-23 @ 15:34) (18 - 19)  SpO2: 100% (10-12-23 @ 15:34) (99% - 100%)    Physical Examination: INCOMPLETE  General - NAD  Cardiovascular - Peripheral pulses palpable, no edema    Neurologic Exam:  Mental status - Awake, Alert, Oriented to person, place (could say hospital, did not know name), and time (only year, not month). Speech fluent, repetition and naming intact. Follows simple and complex commands.     Cranial nerves - PERRLA, VFF, EOMI, face sensation (V1-V3) intact b/l, facial strength intact without asymmetry b/l, palate with symmetric elevation, trapezius OR sternocleidomastoid 5/5 strength b/l, tongue midline on protrusion with full lateral movement    Motor - Normal bulk and tone throughout. No pronator drift.  Strength testing            R        Deltoid:  5    Biceps:  5    Triceps:  5    Wrist Extension:  5    Wrist Flexion:  5    Interossei:  5    :  5    Hip Flexion:  5    Hip Extension:  5    Knee Flexion:  5    Knee Extension:  5    Dorsiflexion:  5    Plantar Flexion:  5        L        Deltoid:  5    Biceps:  5    Triceps:  5    Wrist Extension:  5    Wrist Flexion:  5    Interossei:  5    :  5    Hip Flexion:  5    Hip Extension:  5    Knee Flexion:  5    Knee Extension:  5    Dorsiflexion:  5    Plantar Flexion:  5      Sensation - Light touch/temperature OR pain/vibration intact throughout    DTR's - Patellar reflexes could not be assessed; patient has bilateral knee replacements.               R  Biceps:  2+    Triceps:  2+    Brachioradialis:  2+    Patellar:  -    Ankle:  2+       L  Biceps:  2+    Triceps:  2+    Brachioradialis:  2+    Patellar:  -    Ankle:  2+       Coordination - Finger to Nose intact b/l. No tremors appreciated    Gait and station - Normal casual gait (with cane). Patient felt dizzy (light headed and uncoordinated) and staggered forward and laterally to the right when he started walking with his cane. Romberg (-)    HINTS and Neha Hallpike exams were negative.    Labs:                        13.3   7.55  )-----------( 225      ( 12 Oct 2023 12:44 )             40.4     10-12    140  |  104  |  18  ----------------------------<  120<H>  4.5   |  25  |  0.88    Ca    9.8      12 Oct 2023 12:44  Phos  2.5     10-12  Mg     2.2     10-12    TPro  7.6  /  Alb  4.5  /  TBili  0.3  /  DBili  x   /  AST  25  /  ALT  27  /  AlkPhos  71  10-12    CAPILLARY BLOOD GLUCOSE        LIVER FUNCTIONS - ( 12 Oct 2023 12:44 )  Alb: 4.5 g/dL / Pro: 7.6 g/dL / ALK PHOS: 71 U/L / ALT: 27 U/L / AST: 25 U/L / GGT: x             PT/INR - ( 12 Oct 2023 12:44 )   PT: 10.9 sec;   INR: 1.04 ratio         PTT - ( 12 Oct 2023 12:44 )  PTT:31.8 sec  CSF:                  Radiology:  CT Head No Cont:  (12 Oct 2023 14:25)     Neurology - Consult Note    -  Spectra: [phone] ([facility]), [phone] ([facility])  -    HPI: CLEM TAO, 78y (1945) M/F w/ PMHx significant for HTN, HLD, DM2, Alzheimer's, baseline vertigo, thoracic aneurysm presents for dizziness. Patient states that he tried to get out of bed in the morning, turned towards his right side and stood up and had a room spinning sensation associated with lightheadedness. Patient then fell flat on his face, no LOC, and heard his wife screaming. With wife's assistance, patient went to bathroom and had 5 episodes of non-bloody, non-bilious vomiting. Also describes visual changes with blurry vision at the time. Pt states that his symptoms resolved, but re-occur when he stands up to walk. States that prior to my arrival he got up to use the restroom and felt lightheaded and unbalanced.      Pt. denies any recent infections, illnesses, sick contacts. States that he is hydrated and drinks enough water every day. Denies any changes or additions to his medications.     Pt first had symptoms of dizziness 3 years ago after a fall, followed up with his PCP who prescribed meclizine which the pt states he takes every day and has not missed any doses.     At bedside pt denies feelings of lightheadedness, headache, visual disturbances, numbness, tingling, weakness.     Review of Systems:  INCOMPLETE   All other review of systems is negative unless indicated above.    Allergies:  No Known Allergies  ACE inhibitors (Other (Mild to Mod))      PMHx/PSHx/Family Hx: As above, otherwise see below   Hypertension    Hyperlipidemia    Cardiomyopathy    Diabetes    Osteoarthritis        Social Hx:  No current use of tobacco, alcohol, or illicit drugs  Lives with ***    Medications:  MEDICATIONS  (STANDING):    MEDICATIONS  (PRN):      Vitals:  T(C): 36.6 (10-12-23 @ 13:24), Max: 36.8 (10-12-23 @ 11:30)  HR: 83 (10-12-23 @ 15:34) (59 - 83)  BP: 142/70 (10-12-23 @ 15:34) (142/70 - 163/74)  RR: 19 (10-12-23 @ 15:34) (18 - 19)  SpO2: 100% (10-12-23 @ 15:34) (99% - 100%)    Physical Examination: INCOMPLETE  General - NAD  Cardiovascular - Peripheral pulses palpable, no edema    Neurologic Exam:  Mental status - Awake, Alert, Oriented to person, place (could say hospital, did not know name), and time (only year, not month). Speech fluent, repetition and naming intact. Follows simple and complex commands.     Cranial nerves - PERRLA, VFF, EOMI, face sensation (V1-V3) intact b/l, facial strength intact without asymmetry b/l, palate with symmetric elevation, trapezius OR sternocleidomastoid 5/5 strength b/l, tongue midline on protrusion with full lateral movement    Motor - Normal bulk and tone throughout. No pronator drift.  Strength testing            R        Deltoid:  5    Biceps:  5    Triceps:  5    Wrist Extension:  5    Wrist Flexion:  5    Interossei:  5    :  5    Hip Flexion:  5    Hip Extension:  5    Knee Flexion:  5    Knee Extension:  5    Dorsiflexion:  5    Plantar Flexion:  5        L        Deltoid:  5    Biceps:  5    Triceps:  5    Wrist Extension:  5    Wrist Flexion:  5    Interossei:  5    :  5    Hip Flexion:  5    Hip Extension:  5    Knee Flexion:  5    Knee Extension:  5    Dorsiflexion:  5    Plantar Flexion:  5      Sensation - Light touch/temperature OR pain/vibration intact throughout    DTR's - Patellar reflexes could not be assessed; patient has bilateral knee replacements.               R  Biceps:  2+    Triceps:  2+    Brachioradialis:  2+    Patellar:  -    Ankle:  2+       L  Biceps:  2+    Triceps:  2+    Brachioradialis:  2+    Patellar:  -    Ankle:  2+       Coordination - Finger to Nose intact b/l. No tremors appreciated    Gait and station - Normal casual gait (with cane). Patient felt dizzy (light headed and uncoordinated) and staggered forward and laterally to the right when he started walking with his cane. Romberg (-)    HINTS and Neha Hallpike exams were negative.    Labs:                        13.3   7.55  )-----------( 225      ( 12 Oct 2023 12:44 )             40.4     10-12    140  |  104  |  18  ----------------------------<  120<H>  4.5   |  25  |  0.88    Ca    9.8      12 Oct 2023 12:44  Phos  2.5     10-12  Mg     2.2     10-12    TPro  7.6  /  Alb  4.5  /  TBili  0.3  /  DBili  x   /  AST  25  /  ALT  27  /  AlkPhos  71  10-12    CAPILLARY BLOOD GLUCOSE        LIVER FUNCTIONS - ( 12 Oct 2023 12:44 )  Alb: 4.5 g/dL / Pro: 7.6 g/dL / ALK PHOS: 71 U/L / ALT: 27 U/L / AST: 25 U/L / GGT: x             PT/INR - ( 12 Oct 2023 12:44 )   PT: 10.9 sec;   INR: 1.04 ratio         PTT - ( 12 Oct 2023 12:44 )  PTT:31.8 sec  CSF:                  Radiology:  CT Head No Cont:  (12 Oct 2023 14:25)     Neurology - Consult Note    -  Spectra: [phone] ([facility]), [phone] ([facility])  -    HPI: CLEM TAO, 78y (1945) M w/ PMHx significant for HTN, HLD, DM2, Alzheimer's, baseline vertigo, thoracic aneurysm presents for dizziness. Patient states that he tried to get out of bed in the morning, turned towards his right side and stood up and had a room spinning sensation associated with lightheadedness. Patient then fell flat on his face, no LOC, and heard his wife screaming. With wife's assistance, patient went to bathroom and had 5 episodes of non-bloody, non-bilious vomiting. Also describes visual changes with blurry vision at the time. Pt states that his symptoms resolved, but re-occur when he stands up to walk. States that prior to my arrival he got up to use the restroom and felt lightheaded and unbalanced. Pt. denies any recent infections, illnesses, sick contacts. States that he is hydrated and drinks enough water every day. Denies any changes or additions to his medications. Pt first had symptoms of dizziness 3 years ago after a fall, followed up with his PCP who prescribed meclizine which the pt states he takes every day and has not missed any doses.   Patient's wife reports he has been having significant memory issues, sometimes forgetting the year, sometimes forgetting where he is.  CT of the head reveals microvascular ischemic changes. At bedside pt denies feelings of lightheadedness, headache, visual disturbances, numbness, tingling, weakness.     Review of Systems:    All other review of systems is negative unless indicated above.    Allergies:  No Known Allergies  ACE inhibitors (Other (Mild to Mod))      PMHx/PSHx/Family Hx: As above, otherwise see below   Hypertension    Hyperlipidemia    Cardiomyopathy    Diabetes    Osteoarthritis        Social Hx:  No current use of tobacco, alcohol, or illicit drugs  Lives with wife    Medications:  MEDICATIONS  (STANDING):    MEDICATIONS  (PRN):      Vitals:  T(C): 36.6 (10-12-23 @ 13:24), Max: 36.8 (10-12-23 @ 11:30)  HR: 83 (10-12-23 @ 15:34) (59 - 83)  BP: 142/70 (10-12-23 @ 15:34) (142/70 - 163/74)  RR: 19 (10-12-23 @ 15:34) (18 - 19)  SpO2: 100% (10-12-23 @ 15:34) (99% - 100%)    Physical Examination:  General - NAD  Cardiovascular - Peripheral pulses palpable, no edema    Neurologic Exam:  Mental status - Awake, Alert, Oriented to person, place (could say hospital, did not know its name), and time (only year, not month). Speech fluent, repetition and naming intact. Follows simple and complex commands.     Cranial nerves - PERRLA, VFF, EOMI, face sensation (V1-V3) intact b/l, facial strength intact without asymmetry b/l, palate with symmetric elevation, trapezius 5/5 strength b/l, tongue midline on protrusion with full lateral movement    Motor - Normal bulk and tone throughout. No pronator drift.  Strength testing            R        Deltoid:  5    Biceps:  5    Triceps:  5    Wrist Extension:  5    Wrist Flexion:  5    Interossei:  5    :  5    Hip Flexion:  5    Hip Extension:  5    Knee Flexion:  5    Knee Extension:  5    Dorsiflexion:  5    Plantar Flexion:  5        L        Deltoid:  5    Biceps:  5    Triceps:  5    Wrist Extension:  5    Wrist Flexion:  5    Interossei:  5    :  5    Hip Flexion:  5    Hip Extension:  5    Knee Flexion:  5    Knee Extension:  5    Dorsiflexion:  5    Plantar Flexion:  5      Sensation - Light touch/temperature OR pain/vibration intact throughout    DTR's - Patellar reflexes could not be assessed; patient has bilateral knee replacements.               R  Biceps:  2+    Triceps:  2+    Brachioradialis:  2+    Patellar:  -    Ankle:  2+       L  Biceps:  2+    Triceps:  2+    Brachioradialis:  2+    Patellar:  -    Ankle:  2+       Coordination - Finger to Nose intact b/l. No tremors appreciated    Gait and station - Normal casual gait (with cane). Patient felt dizzy (light headed and uncoordinated) and staggered forward and laterally to the right when he started walking with his cane. Romberg (-)    HINTS and Neha Hallpike exams were negative.    Labs:                        13.3   7.55  )-----------( 225      ( 12 Oct 2023 12:44 )             40.4     10-12    140  |  104  |  18  ----------------------------<  120<H>  4.5   |  25  |  0.88    Ca    9.8      12 Oct 2023 12:44  Phos  2.5     10-12  Mg     2.2     10-12    TPro  7.6  /  Alb  4.5  /  TBili  0.3  /  DBili  x   /  AST  25  /  ALT  27  /  AlkPhos  71  10-12    CAPILLARY BLOOD GLUCOSE        LIVER FUNCTIONS - ( 12 Oct 2023 12:44 )  Alb: 4.5 g/dL / Pro: 7.6 g/dL / ALK PHOS: 71 U/L / ALT: 27 U/L / AST: 25 U/L / GGT: x             PT/INR - ( 12 Oct 2023 12:44 )   PT: 10.9 sec;   INR: 1.04 ratio         PTT - ( 12 Oct 2023 12:44 )  PTT:31.8 sec  CSF:                  Radiology:  CT Head No Cont:  (12 Oct 2023 14:25)    < from: CT Head No Cont (10.12.23 @ 14:25) >  IMPRESSION:      1. Widely patent head and neck vasculature, as outlined above. No   significant stenosis, dissection, or occlusion noted. Vertebrobasilar   system is unremarkable.  2. Small vessel ischemic changes noted in the white matter of both   hemispheres and mild volume loss. No acute infarct, hemorrhage, or   space-occupying lesion noted. Sinuses and mastoids are clear.    < end of copied text >   Neurology - Consult Note    -  Spectra: [phone] ([facility]), [phone] ([facility])  -    HPI: CLEM TAO, 78y (1945) M w/ PMHx significant for HTN, HLD, DM2, baseline vertigo, thoracic aneurysm presents for dizziness. Patient states that he tried to get out of bed in the morning, turned towards his right side and stood up and had a room spinning sensation associated with lightheadedness. Patient then fell flat on his face, no LOC, and heard his wife screaming. With wife's assistance, patient went to bathroom and had 5 episodes of non-bloody, non-bilious vomiting. Also describes visual changes with blurry vision at the time. Pt states that his symptoms resolved, but re-occur when he stands up to walk. States that prior to my arrival he got up to use the restroom and felt lightheaded and unbalanced. Pt. denies any recent infections, illnesses, sick contacts. States that he is hydrated and drinks enough water every day. Denies any changes or additions to his medications. Pt first had symptoms of dizziness 3 years ago after a fall, followed up with his PCP who prescribed meclizine which the pt states he takes every day and has not missed any doses.   Patient's wife reports he has been having significant memory issues, sometimes forgetting the year, sometimes forgetting where he is.  She believes he has Alzheimer's dementia but states he has not gotten any official diagnosis. She requests information to follow up with for outpatient evaluation of memory issues. CT of the head reveals microvascular ischemic changes. At bedside pt denies feelings of lightheadedness, headache, visual disturbances, numbness, tingling, weakness.     Review of Systems:    All other review of systems is negative unless indicated above.    Allergies:  No Known Allergies  ACE inhibitors (Other (Mild to Mod))      PMHx/PSHx/Family Hx: As above, otherwise see below   Hypertension    Hyperlipidemia    Cardiomyopathy    Diabetes    Osteoarthritis        Social Hx:  No current use of tobacco, alcohol, or illicit drugs  Lives with wife    Medications:  MEDICATIONS  (STANDING):    MEDICATIONS  (PRN):      Vitals:  T(C): 36.6 (10-12-23 @ 13:24), Max: 36.8 (10-12-23 @ 11:30)  HR: 83 (10-12-23 @ 15:34) (59 - 83)  BP: 142/70 (10-12-23 @ 15:34) (142/70 - 163/74)  RR: 19 (10-12-23 @ 15:34) (18 - 19)  SpO2: 100% (10-12-23 @ 15:34) (99% - 100%)    Physical Examination:  General - NAD  Cardiovascular - Peripheral pulses palpable, no edema    Neurologic Exam:  Mental status - Awake, Alert, Oriented to person, place (could say hospital, did not know its name), and time (only year, not month). Speech fluent, repetition and naming intact. Follows simple and complex commands.     Cranial nerves - PERRLA, VFF, EOMI, face sensation (V1-V3) intact b/l, facial strength intact without asymmetry b/l, palate with symmetric elevation, trapezius 5/5 strength b/l, tongue midline on protrusion with full lateral movement    Motor - Normal bulk and tone throughout. No pronator drift.  Strength testing            R        Deltoid:  5    Biceps:  5    Triceps:  5    Wrist Extension:  5    Wrist Flexion:  5    Interossei:  5    :  5    Hip Flexion:  5    Hip Extension:  5    Knee Flexion:  5    Knee Extension:  5    Dorsiflexion:  5    Plantar Flexion:  5        L        Deltoid:  5    Biceps:  5    Triceps:  5    Wrist Extension:  5    Wrist Flexion:  5    Interossei:  5    :  5    Hip Flexion:  5    Hip Extension:  5    Knee Flexion:  5    Knee Extension:  5    Dorsiflexion:  5    Plantar Flexion:  5      Sensation - Light touch/temperature OR pain/vibration intact throughout    DTR's - Patellar reflexes could not be assessed; patient has bilateral knee replacements.               R  Biceps:  2+    Triceps:  2+    Brachioradialis:  2+    Patellar:  -    Ankle:  2+       L  Biceps:  2+    Triceps:  2+    Brachioradialis:  2+    Patellar:  -    Ankle:  2+       Coordination - Finger to Nose intact b/l. No tremors appreciated    Gait and station - Normal casual gait (with cane). Patient felt dizzy but was able to walk after slowly standing. Romberg (-)    HINTS and Neha Hallpike exams were negative. (Symptoms resolved at this time per pt.)    Labs:                        13.3   7.55  )-----------( 225      ( 12 Oct 2023 12:44 )             40.4     10-12    140  |  104  |  18  ----------------------------<  120<H>  4.5   |  25  |  0.88    Ca    9.8      12 Oct 2023 12:44  Phos  2.5     10-12  Mg     2.2     10-12    TPro  7.6  /  Alb  4.5  /  TBili  0.3  /  DBili  x   /  AST  25  /  ALT  27  /  AlkPhos  71  10-12    CAPILLARY BLOOD GLUCOSE        LIVER FUNCTIONS - ( 12 Oct 2023 12:44 )  Alb: 4.5 g/dL / Pro: 7.6 g/dL / ALK PHOS: 71 U/L / ALT: 27 U/L / AST: 25 U/L / GGT: x             PT/INR - ( 12 Oct 2023 12:44 )   PT: 10.9 sec;   INR: 1.04 ratio         PTT - ( 12 Oct 2023 12:44 )  PTT:31.8 sec  CSF:                  Radiology:  CT Head No Cont:  (12 Oct 2023 14:25)    < from: CT Head No Cont (10.12.23 @ 14:25) >  IMPRESSION:      1. Widely patent head and neck vasculature, as outlined above. No   significant stenosis, dissection, or occlusion noted. Vertebrobasilar   system is unremarkable.  2. Small vessel ischemic changes noted in the white matter of both   hemispheres and mild volume loss. No acute infarct, hemorrhage, or   space-occupying lesion noted. Sinuses and mastoids are clear.    < end of copied text >

## 2023-10-12 NOTE — ED ADULT NURSE NOTE - NSICDXPASTMEDICALHX_GEN_ALL_CORE_FT
PAST MEDICAL HISTORY:  Cardiomyopathy LVH, LV diastolic dysfunction, EF 42%    Diabetes     Hyperlipidemia     Hypertension     Osteoarthritis

## 2023-10-12 NOTE — ED ADULT NURSE NOTE - NSICDXPASTSURGICALHX_GEN_ALL_CORE_FT
PAST SURGICAL HISTORY:  S/P knee replacement, left 2000    S/P TKR (total knee replacement) Right

## 2023-10-12 NOTE — ED PROVIDER NOTE - ATTENDING CONTRIBUTION TO CARE
78 M W/ hx CAD, HTN, HLD, DM-2 presents to the office with complaints of dizziness, nausea, vomiting that started yesterday. He reports his symptoms have worsened. He vomited about 5 times this morning prior to this visit no blodo in emesis, has hx of vertigo, but this was more severe as pt w/ vomiting. pt w/ worsening dizziness w/ exertion. pt ambulates w/ cane has pain in the L Knee, pt w/ no speech difficulty, no arm/leg weakness, numbness, no cp, no sob. no cought, no fevers, no chills.   On exam, Const: appearing stated age, no acute distress  Head: atraumatic, normocephalic  Eyes: no conjunctival injection and no scleral icterus  ENMT: Atraumatic external nose and ears, Moist mucus membranes  Neck: Symmetric, trachea midline, no c spine tenderness  BACK: no bruising, no midline t/l spine tenderness  CVS: +S1/S2, dorsalis pedis/radial pulse 2+ bilaterally  RESP: Unlabored respiratory effort, Clear to auscultation bilaterally  GI: Nontender/Nondistended, soft abdomen  MSK: Extremities w/o deformity or ttp   Skin: Warm, Dry w/ no rashes  Neuro: GCS=15, CNs II-XII grossly intact, motor in all 4 extremities equal, Sensation grossly intact   Psych: Awake, Alert, & Orientedx3;  Appropriate mood and affect, cooperative  Plan for labs imaging and reassessment possible TIA but pt w/ no neurologic deficit, EOMI, no visual field cut, pt w/ no dysmetria, dizziness now resolved, possible cardiac vs electrolyte imablance reviewed pt hx w/ possible ascending aneurysm w/ 4.0 cm, will obtain dissection study 78 M W/ hx CAD, HTN, HLD, DM-2 presents to the office with complaints of dizziness, nausea, vomiting that started yesterday. He reports his symptoms have worsened. He vomited about 5 times this morning prior to this visit no blodo in emesis, has hx of vertigo, but this was more severe as pt w/ vomiting. pt w/ worsening dizziness w/ exertion. pt ambulates w/ cane has pain in the L Knee, pt w/ no speech difficulty, no arm/leg weakness, numbness, no cp, no sob. no cought, no fevers, no chills.   On exam, Const: appearing stated age, no acute distress  Head: atraumatic, normocephalic  Eyes: no conjunctival injection and no scleral icterus  ENMT: Atraumatic external nose and ears, Moist mucus membranes  Neck: Symmetric, trachea midline, no c spine tenderness  BACK: no bruising, no midline t/l spine tenderness  CVS: +S1/S2, dorsalis pedis/radial pulse 2+ bilaterally  RESP: Unlabored respiratory effort, Clear to auscultation bilaterally  GI: Nontender/Nondistended, soft abdomen  MSK: Extremities w/o deformity or ttp   Neuro: GCS=15, CNs II-XII intact, motor in all 4 extremities equal, Sensation intact in all 4 extremities  Psych: Awake, Alert, & Orientedx3;  Appropriate mood and affect, cooperative  Plan for labs imaging and reassessment possible TIA but pt w/ no neurologic deficit, EOMI, no visual field cut, pt w/ no dysmetria, dizziness now resolved, possible cardiac vs electrolyte imablance reviewed pt hx w/ possible ascending aneurysm w/ 4.0 cm, will obtain dissection study.

## 2023-10-12 NOTE — ED PROVIDER NOTE - NSFOLLOWUPCLINICS_GEN_ALL_ED_FT
[facility]  Neurology Autoimmune Encephalitis  [address] [place] [address]  Phone: [phone]  Fax: [phone]  Follow Up Time: 1-3 Days    [facility] - ENT  Otolaryngology (ENT)  [address] [place] [address]  Phone: [phone]  Fax:

## 2023-10-12 NOTE — CONSULT NOTE ADULT - ASSESSMENT
ASSESSMENT       IMPRESSION   Overall (improved, worsened, stable) neurologically.     RECOMMENDATION         Patient to be seen by team and attending. Note finalized upon attending attestation.  ASSESSMENT   Dehydration secondary to excessive vomiting  Orthostatics  Posterior circulation infarct    IMPRESSION:  78y (1945) M/F w/ PMHx significant for HTN, HLD, DM2, Alzheimer's, baseline vertigo, thoracic aneurysm presenting for lightheadedness, vomiting, and fall after changing position from lying to standing. Due to excessive vomiting, consider dehydration to cause lightheaded and dizzy feelings further exacerbating baseline vertigo. Pt with multiple cerebrovascular risk factors, but in light of and CT showing microvascular changes without acute infarct, hemorrhage, or lesion, and with CTA not revealing occlusion or stenosis, less likely to be CVA.     RECOMMENDATION   [] Check Orthostatics  [] IV Fluids  [] Discuss with stroke fellow      Patient to be seen by team and attending. Note finalized upon attending attestation.  ASSESSMENT   78y (1945) M/F w/ PMHx significant for HTN, HLD, DM2, Alzheimer's, baseline vertigo, thoracic aneurysm presenting for lightheadedness, vomiting, and fall after changing position from lying to standing. Due to excessive vomiting, consider dehydration to cause lightheaded and dizzy feelings further exacerbating baseline vertigo. Pt with multiple cerebrovascular risk factors, but in light of and CT showing microvascular changes without acute infarct, hemorrhage, or lesion, and with CTA not revealing occlusion or stenosis, less likely to be CVA.     IMPRESSION:  Vertigo described as room spinning dizziness with additional lightheaded dizziness upon standing in the setting of known diagnosis of vertigo and 5 episodes of emesis today.  Patient with memory issues at home.  CT head reveals microvascular ischemic changes.  Consistent with BPPV, concern for neuro cognitive decline.    RECOMMENDATION   [] Check B12, folate, RPR, TSH  [] IV Fluids  [] EKG to evaluate for QT prolongation. Consider against zofran if QTc > 500ms  [] Meclizine 12.5mg BID PRN (at most increase to 25mg Q8)  [] Ondansetron 8mg up to q8hr PRN OR Reglan 4mg PRN q8h PRN  [] Diazepam 5mg now then Q8H PRN (for 2 to 3 days)  [] Refer for Vestibular Rehab on discharge  [] ENT f/u outpatient  [] Patient can follow up with general neurology at [address] [place] [address] after discharge. Please instruct the patient to call [phone] to schedule this appointment for neurocognitive evaluation with Dr. Jeff Dupont    Case discussed with Dr. Jhonathan Duff. Note finalized upon attending attestation.  ASSESSMENT   78y (1945) M/F w/ PMHx significant for HTN, HLD, DM2, baseline vertigo, thoracic aneurysm presenting for lightheadedness, vomiting, and fall after changing position from lying to standing. Due to excessive vomiting, consider dehydration to cause lightheaded and dizzy feelings further exacerbating baseline vertigo. Pt with multiple cerebrovascular risk factors, but in light of and CT showing microvascular changes without acute infarct, hemorrhage, or lesion, and with CTA not revealing occlusion or stenosis, less likely to be CVA.     IMPRESSION:  Vertigo described as room spinning dizziness with additional lightheaded dizziness upon standing in the setting of known diagnosis of vertigo and 5 episodes of emesis today.  Patient with memory issues at home.  CT head reveals microvascular ischemic changes.  Consistent with BPPV, concern for neuro cognitive decline.    RECOMMENDATION   [] Check B12, folate, RPR, TSH  [] IV Fluids  [] EKG to evaluate for QT prolongation. Consider against zofran if QTc > 500ms  [] Meclizine 12.5mg BID PRN (at most increase to 25mg Q8)  [] Ondansetron 8mg up to q8hr PRN OR Reglan 4mg PRN q8h PRN  [] Diazepam 5mg now then Q8H PRN (for 2 to 3 days)  [] Refer for Vestibular Rehab on discharge  [] ENT f/u outpatient  [] Patient can follow up with general neurology at [address] [place] [address] after discharge. Please instruct the patient to call [phone] to schedule this appointment for neurocognitive evaluation with Dr. Jeff Dupont    Case discussed with Dr. Jhonathan Duff. Note finalized upon attending attestation.

## 2023-10-12 NOTE — ED ADULT NURSE NOTE - NSICDXFAMILYHX_GEN_ALL_CORE_FT
FAMILY HISTORY:  Father  Still living? Unknown  Family history of prostate cancer, Age at diagnosis: Age Unknown    Mother  Still living? Unknown  Family history of diabetes mellitus type II, Age at diagnosis: Age Unknown    Sibling  Still living? No  Family history of diabetes mellitus type II, Age at diagnosis: Age Unknown

## 2023-10-12 NOTE — CONSULT NOTE ADULT - NSCONSULTADDITIONALINFOA_GEN_ALL_CORE
I was on-call attending and resident discussed the case with me over the phone. Patient was discharged from emergency department. Thank you.  Jhonathan Duff MD

## 2023-10-12 NOTE — ED PROVIDER NOTE - OBJECTIVE STATEMENT
Christine Vega DO (PGY-1)  Patient is a 78-year-old male with PMHx HTN, HLD, cardiomyopathy EF 42%, DM 2, Alzheimer's dementia, baseline vertigo, thoracic aneurysm 4.0cm, who presents via EMS from PCP office for evaluation of 5 episodes of NB/NB vomiting in the setting of dizziness that began last night.  Prior to arrival, he states that he experienced blurred vision, which he notes to be common for his vertigo episodes.  He denies any chest pain, shortness of breath, extremity swelling, numbness, tingling, recent injuries, falls or any other acute symptoms at this time. Christine Vega DO (PGY-1)  Patient is a 78-year-old male with PMHx HTN, HLD, DM 2, Alzheimer's dementia, baseline vertigo, thoracic aneurysm 4.0cm, who presents via EMS from PCP office for evaluation of 5 episodes of NB/NB vomiting in the setting of dizziness that began last night.  Prior to arrival, he states that he experienced blurred vision, which he notes to be common for his vertigo episodes.  He denies any chest pain, shortness of breath, extremity swelling, numbness, tingling, recent injuries, falls or any other acute symptoms at this time.

## 2023-10-12 NOTE — ED PROVIDER NOTE - PROGRESS NOTE DETAILS
neuro paged for small vessel ischemic changes noted in the white matter of both   hemispheres and mild volume loss on CT Discussed discharge with neurology -- ok from their standpoint with f/u outpatient. Seen pt walking around interacting, currently asymptomatic. will plan for d/c with neuro Rx recommendations.

## 2023-10-12 NOTE — ED PROVIDER NOTE - CLINICAL SUMMARY MEDICAL DECISION MAKING FREE TEXT BOX
Patient is a 78-year-old male with PMHx HTN, DM 2, Alzheimer's dementia, baseline vertigo, thoracic aneurysm 4.0cm who presents via EMS from PCP office for evaluation of 5 episodes of NB/NB vomiting in the setting of dizziness that began last night.  Prior to arrival, he states that he experienced blurred vision, which he notes to be common for his vertigo episodes, since resolved. PE shows a well, nontoxic appearing elderly man who is speaking in full sentences and resting comfortably.  PERRLA with EOMI and no nystagmus.  Head is atraumatic normocephalic.  Neurological exam shows no focal deficits, CN II-VII intact.  Heart and lungs WNL.  Abdomen soft nontender.  Exam was otherwise unremarkable. Diffuse T-wave inversions seen on EKG with no baseline EKG on file. Per chart review, pt with nml echo EF >55%.  Differentials include but are not limited to TIA vs. atypical ACS vs. infection vs. metabolic derangement vs. exacerbation of baseline vertigo.  Dispo pending results. Patient is a 78-year-old male with PMHx HTN, DM 2, Alzheimer's dementia, baseline vertigo, thoracic aneurysm 4.0cm who presents via EMS from PCP office for evaluation of 5 episodes of NB/NB vomiting in the setting of dizziness that began last night.  Prior to arrival, he states that he experienced blurred vision, which he notes to be common for his vertigo episodes, since resolved. PE shows a well, nontoxic appearing elderly man who is speaking in full sentences and resting comfortably.  PERRLA with EOMI and no nystagmus.  Head is atraumatic normocephalic.  Neurological exam shows no focal deficits, CN II-VII intact.  Heart and lungs WNL.  Abdomen soft nontender.  Exam was otherwise unremarkable. Diffuse T-wave inversions seen on EKG with no baseline EKG on file. Per chart review, pt with echo EF >55% from 3/2023.   Differentials include but are not limited to TIA vs. atypical ACS vs. infection vs. metabolic derangement vs. exacerbation of baseline vertigo.  Dispo pending results, likely will require admission for obs at minimum.

## 2023-10-12 NOTE — ED ADULT NURSE NOTE - ISOLATION TYPE:
Cc:  Pregnancy follow up.  Patient feels well.  Working on quitting smoking.  She is down to half pack per day.  No bleeding or spotting.  No cramping/abdominal pain.   Vitals reviewed by me.  Gen - alert and pleasant.  Abdomen - gravid, nontender, no guarding or rebound.  Reviewed ultrasound and labs from previous visit.  Toxicology screen positive for amphetamines and opiates.  A/P:  IUP at 17 weeks with maternal tobacco use, drug exposure  - Discussed importance of quitting smoking.  - Discussed importance of avoiding illicit drugs during pregnancy.  Patient to let me know if she needing help/assistance with drug abuse.  - Anatomic survey in 2 weeks.  
None

## 2023-10-12 NOTE — ED PROVIDER NOTE - PATIENT PORTAL LINK FT
You can access the FollowMyHealth Patient Portal offered by [facility] by registering at the following website: [email or web address]. By joining [employer]’s FollowMyHealth portal, you will also be able to view your health information using other applications (apps) compatible with our system.

## 2023-10-12 NOTE — ED ADULT NURSE NOTE - NS PRO PASSIVE SMOKE EXP
[facility] AT [facility] Sharlene completed 4-[birth year]-43 to 8-13-19
No

## 2023-10-12 NOTE — ED PROVIDER NOTE - PHYSICAL EXAMINATION
General: No apparent distress. Nontoxic, well appearing. Speaking in full sentences.  Head: NC/AT.   Eyes: PERRLA with EOMI.   Neck: Supple. Trachea midline. Full active to passive ROM. No tenderness.  Cardiac: Normal S1 and S2 w/ RRR. No murmurs, rubs or gallops appreciated. No JVD appreciated.  Pulmonary: CTA bilaterally. No increased WOB.  Abdominal: Soft, nondistended, non-rigid, non-tender.  Neurologic: No focal sensory or motor deficits. CN II-VII intact. Moves all 4 extremities.  Musculoskeletal: Strength appropriate in all 4 extremities for age with no limited ROM. No visible deformities.  Skin: Color appropriate for race. Intact, warm, and well-perfused.  Psychiatric: A&O x1, baseline per wife. Appropriate mood and affect. No apparent risk to self or others.

## 2023-10-12 NOTE — ED ADULT NURSE NOTE - NSFALLRISKINTERV_ED_ALL_ED

## 2023-10-12 NOTE — ED PROVIDER NOTE - NSFOLLOWUPINSTRUCTIONS_ED_ALL_ED_FT
Patient can follow up with general neurology at [address] [place] [address] after discharge. Please instruct the patient to call [phone] to schedule this appointment for neurocognitive evaluation with Dr. Jeff Dupont You have been evaluated in the Emergency Department today for dizziness. Your evaluation suggests that your symptoms are most likely due to peripheral vertigo.    You have been prescribed meclizine, zofran and diazepam to help relieve your symptoms. Please take your prescription as directed.    Please follow up with your primary care doctor in 2-3 days. Additionally, please call the neurology office listed in the follow up section, and schedule an appointment with Dr. Jeff Dupont for a neurocognitive evaluation at [phone]. Additionally, please call the ENT listed below to make an appointment. Someone from our team will reach out to you for an appointment with vestibular rehabilitation. In total, there will be three appointments to schedule.    Return to the ER immediately for worsening or uncontrolled symptoms, worsening headache, chest pain, shortness of breath, persistent vomiting, vision changes, fainting, or for any other concerning symptoms

## 2023-10-14 LAB
CULTURE RESULTS: NO GROWTH — SIGNIFICANT CHANGE UP
SPECIMEN SOURCE: SIGNIFICANT CHANGE UP

## 2023-11-21 ENCOUNTER — RX RENEWAL (OUTPATIENT)
Age: 78
End: 2023-11-21

## 2023-12-05 ENCOUNTER — APPOINTMENT (OUTPATIENT)
Dept: CARDIOLOGY | Facility: CLINIC | Age: 78
End: 2023-12-05

## 2023-12-06 ENCOUNTER — APPOINTMENT (OUTPATIENT)
Dept: UROLOGY | Facility: CLINIC | Age: 78
End: 2023-12-06

## 2023-12-07 ENCOUNTER — APPOINTMENT (OUTPATIENT)
Dept: OPHTHALMOLOGY | Facility: CLINIC | Age: 78
End: 2023-12-07

## 2024-01-07 ENCOUNTER — RX RENEWAL (OUTPATIENT)
Age: 79
End: 2024-01-07

## 2024-01-31 RX ORDER — EZETIMIBE 10 MG/1
10 TABLET ORAL
Qty: 90 | Refills: 1 | Status: ACTIVE | COMMUNITY
Start: 2022-01-05 | End: 1900-01-01

## 2024-04-06 ENCOUNTER — RX RENEWAL (OUTPATIENT)
Age: 79
End: 2024-04-06

## 2024-04-06 RX ORDER — CARVEDILOL 6.25 MG/1
6.25 TABLET, FILM COATED ORAL
Qty: 90 | Refills: 1 | Status: ACTIVE | COMMUNITY
Start: 2021-12-08 | End: 1900-01-01

## 2024-04-25 RX ORDER — MECLIZINE HYDROCHLORIDE 12.5 MG/1
12.5 TABLET ORAL
Qty: 60 | Refills: 0 | Status: ACTIVE | COMMUNITY
Start: 2022-06-08 | End: 1900-01-01

## 2024-05-06 ENCOUNTER — RX RENEWAL (OUTPATIENT)
Age: 79
End: 2024-05-06

## 2024-05-06 RX ORDER — ATORVASTATIN CALCIUM 20 MG/1
20 TABLET, FILM COATED ORAL
Qty: 90 | Refills: 1 | Status: ACTIVE | COMMUNITY
Start: 2021-12-08 | End: 1900-01-01

## 2024-06-23 ENCOUNTER — RX RENEWAL (OUTPATIENT)
Age: 79
End: 2024-06-23

## 2024-07-02 ENCOUNTER — RX RENEWAL (OUTPATIENT)
Age: 79
End: 2024-07-02

## 2024-09-26 ENCOUNTER — RX RENEWAL (OUTPATIENT)
Age: 79
End: 2024-09-26

## 2024-11-02 ENCOUNTER — RX RENEWAL (OUTPATIENT)
Age: 79
End: 2024-11-02

## 2025-01-25 ENCOUNTER — INPATIENT (INPATIENT)
Facility: HOSPITAL | Age: 80
LOS: 6 days | Discharge: NURSING, REHAB OR LONG-TERM CARE | DRG: 884 | End: 2025-02-01
Attending: HOSPITALIST | Admitting: STUDENT IN AN ORGANIZED HEALTH CARE EDUCATION/TRAINING PROGRAM
Payer: MEDICARE

## 2025-01-25 VITALS
SYSTOLIC BLOOD PRESSURE: 163 MMHG | WEIGHT: 195.11 LBS | HEART RATE: 115 BPM | DIASTOLIC BLOOD PRESSURE: 93 MMHG | OXYGEN SATURATION: 97 % | HEIGHT: 68 IN | RESPIRATION RATE: 19 BRPM | TEMPERATURE: 97 F

## 2025-01-25 DIAGNOSIS — M62.82 RHABDOMYOLYSIS: ICD-10-CM

## 2025-01-25 DIAGNOSIS — E78.5 HYPERLIPIDEMIA, UNSPECIFIED: ICD-10-CM

## 2025-01-25 DIAGNOSIS — Z96.652 PRESENCE OF LEFT ARTIFICIAL KNEE JOINT: Chronic | ICD-10-CM

## 2025-01-25 DIAGNOSIS — Z96.659 PRESENCE OF UNSPECIFIED ARTIFICIAL KNEE JOINT: Chronic | ICD-10-CM

## 2025-01-25 DIAGNOSIS — R45.1 RESTLESSNESS AND AGITATION: ICD-10-CM

## 2025-01-25 DIAGNOSIS — I10 ESSENTIAL (PRIMARY) HYPERTENSION: ICD-10-CM

## 2025-01-25 DIAGNOSIS — E11.9 TYPE 2 DIABETES MELLITUS WITHOUT COMPLICATIONS: ICD-10-CM

## 2025-01-25 DIAGNOSIS — G31.83 NEUROCOGNITIVE DISORDER WITH LEWY BODIES: ICD-10-CM

## 2025-01-25 LAB
ALBUMIN SERPL ELPH-MCNC: 4.1 G/DL — SIGNIFICANT CHANGE UP (ref 3.3–5)
ALP SERPL-CCNC: 94 U/L — SIGNIFICANT CHANGE UP (ref 40–120)
ALT FLD-CCNC: 33 U/L — SIGNIFICANT CHANGE UP (ref 10–45)
ANION GAP SERPL CALC-SCNC: 16 MMOL/L — SIGNIFICANT CHANGE UP (ref 5–17)
APPEARANCE UR: CLEAR — SIGNIFICANT CHANGE UP
AST SERPL-CCNC: 72 U/L — HIGH (ref 10–40)
B-OH-BUTYR SERPL-SCNC: 1.4 MMOL/L — HIGH
BACTERIA # UR AUTO: NEGATIVE /HPF — SIGNIFICANT CHANGE UP
BASE EXCESS BLDV CALC-SCNC: 4.5 MMOL/L — HIGH (ref -2–3)
BASOPHILS # BLD AUTO: 0.01 K/UL — SIGNIFICANT CHANGE UP (ref 0–0.2)
BASOPHILS NFR BLD AUTO: 0.1 % — SIGNIFICANT CHANGE UP (ref 0–2)
BILIRUB SERPL-MCNC: 0.9 MG/DL — SIGNIFICANT CHANGE UP (ref 0.2–1.2)
BILIRUB UR-MCNC: NEGATIVE — SIGNIFICANT CHANGE UP
BUN SERPL-MCNC: 13 MG/DL — SIGNIFICANT CHANGE UP (ref 7–23)
CALCIUM SERPL-MCNC: 10.3 MG/DL — SIGNIFICANT CHANGE UP (ref 8.4–10.5)
CAST: 11 /LPF — HIGH (ref 0–4)
CHLORIDE SERPL-SCNC: 100 MMOL/L — SIGNIFICANT CHANGE UP (ref 96–108)
CK SERPL-CCNC: 2151 U/L — HIGH (ref 30–200)
CO2 BLDV-SCNC: 32 MMOL/L — HIGH (ref 22–26)
CO2 SERPL-SCNC: 22 MMOL/L — SIGNIFICANT CHANGE UP (ref 22–31)
COLOR SPEC: SIGNIFICANT CHANGE UP
CREAT SERPL-MCNC: 0.83 MG/DL — SIGNIFICANT CHANGE UP (ref 0.5–1.3)
DIFF PNL FLD: ABNORMAL
EGFR: 90 ML/MIN/1.73M2 — SIGNIFICANT CHANGE UP
EOSINOPHIL # BLD AUTO: 0.01 K/UL — SIGNIFICANT CHANGE UP (ref 0–0.5)
EOSINOPHIL NFR BLD AUTO: 0.1 % — SIGNIFICANT CHANGE UP (ref 0–6)
FLUAV AG NPH QL: SIGNIFICANT CHANGE UP
FLUBV AG NPH QL: SIGNIFICANT CHANGE UP
GAS PNL BLDV: SIGNIFICANT CHANGE UP
GLUCOSE SERPL-MCNC: 128 MG/DL — HIGH (ref 70–99)
GLUCOSE UR QL: NEGATIVE MG/DL — SIGNIFICANT CHANGE UP
HCO3 BLDV-SCNC: 30 MMOL/L — HIGH (ref 22–29)
HCT VFR BLD CALC: 44.4 % — SIGNIFICANT CHANGE UP (ref 39–50)
HGB BLD-MCNC: 14.6 G/DL — SIGNIFICANT CHANGE UP (ref 13–17)
IMM GRANULOCYTES NFR BLD AUTO: 0.7 % — SIGNIFICANT CHANGE UP (ref 0–0.9)
KETONES UR-MCNC: 40 MG/DL
LEUKOCYTE ESTERASE UR-ACNC: NEGATIVE — SIGNIFICANT CHANGE UP
LYMPHOCYTES # BLD AUTO: 0.89 K/UL — LOW (ref 1–3.3)
LYMPHOCYTES # BLD AUTO: 7.4 % — LOW (ref 13–44)
MAGNESIUM SERPL-MCNC: 2.1 MG/DL — SIGNIFICANT CHANGE UP (ref 1.6–2.6)
MCHC RBC-ENTMCNC: 29.1 PG — SIGNIFICANT CHANGE UP (ref 27–34)
MCHC RBC-ENTMCNC: 32.9 G/DL — SIGNIFICANT CHANGE UP (ref 32–36)
MCV RBC AUTO: 88.4 FL — SIGNIFICANT CHANGE UP (ref 80–100)
MONOCYTES # BLD AUTO: 1 K/UL — HIGH (ref 0–0.9)
MONOCYTES NFR BLD AUTO: 8.3 % — SIGNIFICANT CHANGE UP (ref 2–14)
MUCOUS THREADS # UR AUTO: PRESENT
NEUTROPHILS # BLD AUTO: 10.1 K/UL — HIGH (ref 1.8–7.4)
NEUTROPHILS NFR BLD AUTO: 83.4 % — HIGH (ref 43–77)
NITRITE UR-MCNC: NEGATIVE — SIGNIFICANT CHANGE UP
NRBC # BLD: 0 /100 WBCS — SIGNIFICANT CHANGE UP (ref 0–0)
NRBC BLD-RTO: 0 /100 WBCS — SIGNIFICANT CHANGE UP (ref 0–0)
NT-PROBNP SERPL-SCNC: 1028 PG/ML — HIGH (ref 0–300)
OSMOLALITY SERPL: 289 MOSMOL/KG — SIGNIFICANT CHANGE UP (ref 280–301)
PCO2 BLDV: 49 MMHG — SIGNIFICANT CHANGE UP (ref 42–55)
PH BLDV: 7.4 — SIGNIFICANT CHANGE UP (ref 7.32–7.43)
PH UR: 5.5 — SIGNIFICANT CHANGE UP (ref 5–8)
PLATELET # BLD AUTO: 206 K/UL — SIGNIFICANT CHANGE UP (ref 150–400)
PO2 BLDV: 31 MMHG — SIGNIFICANT CHANGE UP (ref 25–45)
POTASSIUM SERPL-MCNC: 4.7 MMOL/L — SIGNIFICANT CHANGE UP (ref 3.5–5.3)
POTASSIUM SERPL-SCNC: 4.7 MMOL/L — SIGNIFICANT CHANGE UP (ref 3.5–5.3)
PROT SERPL-MCNC: 8 G/DL — SIGNIFICANT CHANGE UP (ref 6–8.3)
PROT UR-MCNC: 100 MG/DL
RBC # BLD: 5.02 M/UL — SIGNIFICANT CHANGE UP (ref 4.2–5.8)
RBC # FLD: 13.2 % — SIGNIFICANT CHANGE UP (ref 10.3–14.5)
RBC CASTS # UR COMP ASSIST: 1 /HPF — SIGNIFICANT CHANGE UP (ref 0–4)
REVIEW: SIGNIFICANT CHANGE UP
RSV RNA NPH QL NAA+NON-PROBE: SIGNIFICANT CHANGE UP
SAO2 % BLDV: 54.4 % — LOW (ref 67–88)
SARS-COV-2 RNA SPEC QL NAA+PROBE: SIGNIFICANT CHANGE UP
SODIUM SERPL-SCNC: 138 MMOL/L — SIGNIFICANT CHANGE UP (ref 135–145)
SP GR SPEC: 1.02 — SIGNIFICANT CHANGE UP (ref 1–1.03)
SQUAMOUS # UR AUTO: 2 /HPF — SIGNIFICANT CHANGE UP (ref 0–5)
TROPONIN T, HIGH SENSITIVITY RESULT: 50 NG/L — SIGNIFICANT CHANGE UP (ref 0–51)
UROBILINOGEN FLD QL: 1 MG/DL — SIGNIFICANT CHANGE UP (ref 0.2–1)
WBC # BLD: 12.09 K/UL — HIGH (ref 3.8–10.5)
WBC # FLD AUTO: 12.09 K/UL — HIGH (ref 3.8–10.5)
WBC UR QL: 3 /HPF — SIGNIFICANT CHANGE UP (ref 0–5)

## 2025-01-25 PROCEDURE — 71045 X-RAY EXAM CHEST 1 VIEW: CPT | Mod: 26

## 2025-01-25 PROCEDURE — 99285 EMERGENCY DEPT VISIT HI MDM: CPT | Mod: GC

## 2025-01-25 PROCEDURE — 99223 1ST HOSP IP/OBS HIGH 75: CPT

## 2025-01-25 PROCEDURE — 70450 CT HEAD/BRAIN W/O DYE: CPT | Mod: 26

## 2025-01-25 RX ORDER — GLUCAGON 3 MG/1
1 POWDER NASAL ONCE
Refills: 0 | Status: DISCONTINUED | OUTPATIENT
Start: 2025-01-25 | End: 2025-02-01

## 2025-01-25 RX ORDER — AMLODIPINE BESYLATE 5 MG
5 TABLET ORAL DAILY
Refills: 0 | Status: DISCONTINUED | OUTPATIENT
Start: 2025-01-25 | End: 2025-02-01

## 2025-01-25 RX ORDER — BACTERIOSTATIC SODIUM CHLORIDE 0.9 %
1000 VIAL (ML) INJECTION ONCE
Refills: 0 | Status: COMPLETED | OUTPATIENT
Start: 2025-01-25 | End: 2025-01-25

## 2025-01-25 RX ORDER — SODIUM CHLORIDE 9 G/ML
1000 INJECTION, SOLUTION INTRAVENOUS
Refills: 0 | Status: DISCONTINUED | OUTPATIENT
Start: 2025-01-25 | End: 2025-02-01

## 2025-01-25 RX ORDER — SODIUM CHLORIDE 9 G/ML
1000 INJECTION, SOLUTION INTRAVENOUS
Refills: 0 | Status: DISCONTINUED | OUTPATIENT
Start: 2025-01-25 | End: 2025-01-29

## 2025-01-25 RX ORDER — THIAMINE HCL 100 MG
500 TABLET ORAL ONCE
Refills: 0 | Status: COMPLETED | OUTPATIENT
Start: 2025-01-25 | End: 2025-01-25

## 2025-01-25 RX ORDER — ATORVASTATIN CALCIUM 80 MG/1
1 TABLET, FILM COATED ORAL
Refills: 0 | DISCHARGE

## 2025-01-25 RX ORDER — ACETAMINOPHEN, DIPHENHYDRAMINE HCL, PHENYLEPHRINE HCL 325; 25; 5 MG/1; MG/1; MG/1
3 TABLET ORAL AT BEDTIME
Refills: 0 | Status: DISCONTINUED | OUTPATIENT
Start: 2025-01-25 | End: 2025-02-01

## 2025-01-25 RX ORDER — ACETAMINOPHEN 160 MG/5ML
650 SUSPENSION ORAL EVERY 6 HOURS
Refills: 0 | Status: DISCONTINUED | OUTPATIENT
Start: 2025-01-25 | End: 2025-02-01

## 2025-01-25 RX ORDER — EZETIMIBE 10 MG
10 TABLET ORAL DAILY
Refills: 0 | Status: DISCONTINUED | OUTPATIENT
Start: 2025-01-25 | End: 2025-02-01

## 2025-01-25 RX ORDER — INSULIN LISPRO 100/ML
VIAL (ML) SUBCUTANEOUS
Refills: 0 | Status: DISCONTINUED | OUTPATIENT
Start: 2025-01-25 | End: 2025-02-01

## 2025-01-25 RX ORDER — CARVEDILOL 6.25 MG
6.25 TABLET ORAL EVERY 12 HOURS
Refills: 0 | Status: DISCONTINUED | OUTPATIENT
Start: 2025-01-25 | End: 2025-02-01

## 2025-01-25 RX ORDER — DM/PSEUDOEPHED/ACETAMINOPHEN 10-30-250
12.5 CAPSULE ORAL ONCE
Refills: 0 | Status: DISCONTINUED | OUTPATIENT
Start: 2025-01-25 | End: 2025-02-01

## 2025-01-25 RX ORDER — DM/PSEUDOEPHED/ACETAMINOPHEN 10-30-250
15 CAPSULE ORAL ONCE
Refills: 0 | Status: DISCONTINUED | OUTPATIENT
Start: 2025-01-25 | End: 2025-02-01

## 2025-01-25 RX ORDER — ATORVASTATIN CALCIUM 80 MG/1
20 TABLET, FILM COATED ORAL AT BEDTIME
Refills: 0 | Status: DISCONTINUED | OUTPATIENT
Start: 2025-01-25 | End: 2025-02-01

## 2025-01-25 RX ORDER — DM/PSEUDOEPHED/ACETAMINOPHEN 10-30-250
25 CAPSULE ORAL ONCE
Refills: 0 | Status: DISCONTINUED | OUTPATIENT
Start: 2025-01-25 | End: 2025-02-01

## 2025-01-25 RX ADMIN — Medication 1000 MILLILITER(S): at 18:52

## 2025-01-25 RX ADMIN — Medication 105 MILLIGRAM(S): at 19:14

## 2025-01-25 RX ADMIN — SODIUM CHLORIDE 100 MILLILITER(S): 9 INJECTION, SOLUTION INTRAVENOUS at 23:13

## 2025-01-25 NOTE — ED ADULT NURSE REASSESSMENT NOTE - NS ED NURSE REASSESS COMMENT FT1
pt straight cath performed with 2 RN at bedside to ensure sterile technique, pt produced 400 cc of dark yellow urine, pt tolerated procedure well, pt comfort and safety secured
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
## 2025-01-25 NOTE — H&P ADULT - PROBLEM SELECTOR PLAN 1
Agitation likely underlying LBD (below) and/or delirium  - F/u TSH  - F/u RVP  - LR@100  - Will re-orient at bedside as needed  - Social work and case management consult for placement/aid at home for patient and patient's family safety

## 2025-01-25 NOTE — ED ADULT TRIAGE NOTE - CHIEF COMPLAINT QUOTE
states worsening mental status decline hx of dementia, decreased PO intake and noncompliant w/ medications as per family

## 2025-01-25 NOTE — H&P ADULT - PROBLEM SELECTOR PLAN 2
Based on family's description, high suspicion for lewy body dementia in patient  - Avoid benzos and first generation anti-psychotics including haldol  - If severely agitated, order one time doses of zyprexa  - Please obtain neurology consult for further eval and to establish care w/outpatient follow up  - R/o underlying delirium causes  - Can initiate donepezil 10mg if delirium generally ruled out prior to discharge

## 2025-01-25 NOTE — ED PROVIDER NOTE - NS ED MD DISPO SPECIAL CONSIDERATION1
Breath sounds are clear, no distress present, no wheeze, rales, rhonchi or tachypnea. Normal rate and effort. Dementia

## 2025-01-25 NOTE — ED ADULT NURSE NOTE - OBJECTIVE STATEMENT
79y Male BIBEMS complaining of  worsening mental status decline hx of dementia, decreased PO intake and noncompliant w/ medications as per family

## 2025-01-25 NOTE — H&P ADULT - ASSESSMENT
79M w/Hx of HTN, HLD, DM2, vertigo, thoracic aneurysm presents due to worsening AMS. Patient has had worsening AMS for over 1 year that had acutely worsened over the last 3 days.

## 2025-01-25 NOTE — ED PROVIDER NOTE - PHYSICAL EXAMINATION
GENERAL: poorly kept, smells or urine, difficult to arouse   HEENT: NC/AT, dry mucous membranes  LUNGS: CTAB, no wheezes or crackles   CARDIAC: RRR, no m/r/g  ABDOMEN: Soft, non tender, non distended, no rebound, no guarding  EXT: No edema, no calf tenderness,   NEURO: A&Ox0. Moving all extremities.  SKIN: Warm and dry.
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
## 2025-01-25 NOTE — ED PROVIDER NOTE - PROGRESS NOTE DETAILS
Sara Arndt MD PGY-3: Patient taken in signout at time of shift change pending labs, CT read, UA to assess for obvious toxic/metabolic etiology for patient's worsening mental status/functional decline. labs notable for elevated CK, IVF given. otherwise UA without obvious infection, head CT negative. plan TBA medicine for likely placement.

## 2025-01-25 NOTE — H&P ADULT - NSHPPHYSICALEXAM_GEN_ALL_CORE
T(C): 37.2 (01-26-25 @ 05:10), Max: 37.5 (01-26-25 @ 00:36)  HR: 86 (01-26-25 @ 05:10) (86 - 122)  BP: 144/77 (01-26-25 @ 05:10) (144/77 - 178/88)  RR: 17 (01-26-25 @ 05:10) (16 - 19)  SpO2: 98% (01-26-25 @ 05:10) (97% - 98%)    CONSTITUTIONAL: No apparent distress. Ill appearing.  EYES: PERRLA and symmetric  ENMT: Oral mucosa with moist membranes.  NECK: Supple, symmetric. No JVD.  RESP: No respiratory distress, no use of accessory muscles; CTA b/l, no WRR  CV: Tachycardic, +S1S2, no MRG  GI: Soft, NT, ND, no rebound, no guarding  : No suprapubic tenderness. No CVA tenderness.  LYMPH: No cervical LAD or tenderness  MSK: No spinal tenderness, normal muscle strength/tone  EXTREMITIES: No pedal edema  SKIN: No rashes or ulcers noted  NEURO: Lethargic not alert. Arousable to tactile stimuli.  PSYCH: Agitated, uncooperative

## 2025-01-25 NOTE — H&P ADULT - NSHPLABSRESULTS_GEN_ALL_CORE
14.6   12.09 )-----------( 206      ( 25 Jan 2025 18:10 )             44.4       01-25    138  |  100  |  13  ----------------------------<  128[H]  4.7   |  22  |  0.83    Ca    10.3      25 Jan 2025 18:10  Mg     2.1     01-25    TPro  8.0  /  Alb  4.1  /  TBili  0.9  /  DBili  x   /  AST  72[H]  /  ALT  33  /  AlkPhos  94  01-25

## 2025-01-26 LAB
A1C WITH ESTIMATED AVERAGE GLUCOSE RESULT: 6.7 % — HIGH (ref 4–5.6)
ALBUMIN SERPL ELPH-MCNC: 3.7 G/DL — SIGNIFICANT CHANGE UP (ref 3.3–5)
ALP SERPL-CCNC: 88 U/L — SIGNIFICANT CHANGE UP (ref 40–120)
ALT FLD-CCNC: 30 U/L — SIGNIFICANT CHANGE UP (ref 10–45)
ANION GAP SERPL CALC-SCNC: 15 MMOL/L — SIGNIFICANT CHANGE UP (ref 5–17)
AST SERPL-CCNC: 55 U/L — HIGH (ref 10–40)
BILIRUB SERPL-MCNC: 0.8 MG/DL — SIGNIFICANT CHANGE UP (ref 0.2–1.2)
BUN SERPL-MCNC: 15 MG/DL — SIGNIFICANT CHANGE UP (ref 7–23)
CALCIUM SERPL-MCNC: 9.6 MG/DL — SIGNIFICANT CHANGE UP (ref 8.4–10.5)
CHLORIDE SERPL-SCNC: 103 MMOL/L — SIGNIFICANT CHANGE UP (ref 96–108)
CHOLEST SERPL-MCNC: 206 MG/DL — HIGH
CK SERPL-CCNC: 1368 U/L — HIGH (ref 30–200)
CO2 SERPL-SCNC: 22 MMOL/L — SIGNIFICANT CHANGE UP (ref 22–31)
CREAT SERPL-MCNC: 0.88 MG/DL — SIGNIFICANT CHANGE UP (ref 0.5–1.3)
CULTURE RESULTS: NO GROWTH — SIGNIFICANT CHANGE UP
EGFR: 89 ML/MIN/1.73M2 — SIGNIFICANT CHANGE UP
ESTIMATED AVERAGE GLUCOSE: 146 MG/DL — HIGH (ref 68–114)
GLUCOSE BLDC GLUCOMTR-MCNC: 111 MG/DL — HIGH (ref 70–99)
GLUCOSE BLDC GLUCOMTR-MCNC: 119 MG/DL — HIGH (ref 70–99)
GLUCOSE BLDC GLUCOMTR-MCNC: 124 MG/DL — HIGH (ref 70–99)
GLUCOSE BLDC GLUCOMTR-MCNC: 134 MG/DL — HIGH (ref 70–99)
GLUCOSE SERPL-MCNC: 128 MG/DL — HIGH (ref 70–99)
HCT VFR BLD CALC: 43.1 % — SIGNIFICANT CHANGE UP (ref 39–50)
HDLC SERPL-MCNC: 71 MG/DL — SIGNIFICANT CHANGE UP
HGB BLD-MCNC: 14.5 G/DL — SIGNIFICANT CHANGE UP (ref 13–17)
LIPID PNL WITH DIRECT LDL SERPL: 122 MG/DL — HIGH
MAGNESIUM SERPL-MCNC: 2 MG/DL — SIGNIFICANT CHANGE UP (ref 1.6–2.6)
MCHC RBC-ENTMCNC: 30.5 PG — SIGNIFICANT CHANGE UP (ref 27–34)
MCHC RBC-ENTMCNC: 33.6 G/DL — SIGNIFICANT CHANGE UP (ref 32–36)
MCV RBC AUTO: 90.5 FL — SIGNIFICANT CHANGE UP (ref 80–100)
NON HDL CHOLESTEROL: 134 MG/DL — HIGH
NRBC # BLD: 0 /100 WBCS — SIGNIFICANT CHANGE UP (ref 0–0)
NRBC BLD-RTO: 0 /100 WBCS — SIGNIFICANT CHANGE UP (ref 0–0)
PHOSPHATE SERPL-MCNC: 2.6 MG/DL — SIGNIFICANT CHANGE UP (ref 2.5–4.5)
PLATELET # BLD AUTO: 249 K/UL — SIGNIFICANT CHANGE UP (ref 150–400)
POTASSIUM SERPL-MCNC: 4.1 MMOL/L — SIGNIFICANT CHANGE UP (ref 3.5–5.3)
POTASSIUM SERPL-SCNC: 4.1 MMOL/L — SIGNIFICANT CHANGE UP (ref 3.5–5.3)
PROT SERPL-MCNC: 7.4 G/DL — SIGNIFICANT CHANGE UP (ref 6–8.3)
RAPID RVP RESULT: SIGNIFICANT CHANGE UP
RBC # BLD: 4.76 M/UL — SIGNIFICANT CHANGE UP (ref 4.2–5.8)
RBC # FLD: 13.3 % — SIGNIFICANT CHANGE UP (ref 10.3–14.5)
SARS-COV-2 RNA SPEC QL NAA+PROBE: SIGNIFICANT CHANGE UP
SODIUM SERPL-SCNC: 140 MMOL/L — SIGNIFICANT CHANGE UP (ref 135–145)
SPECIMEN SOURCE: SIGNIFICANT CHANGE UP
T4 AB SER-ACNC: 9 UG/DL — SIGNIFICANT CHANGE UP (ref 4.6–12)
TRIGL SERPL-MCNC: 67 MG/DL — SIGNIFICANT CHANGE UP
TSH SERPL-MCNC: 2.01 UIU/ML — SIGNIFICANT CHANGE UP (ref 0.27–4.2)
WBC # BLD: 10.99 K/UL — HIGH (ref 3.8–10.5)
WBC # FLD AUTO: 10.99 K/UL — HIGH (ref 3.8–10.5)

## 2025-01-26 PROCEDURE — 70553 MRI BRAIN STEM W/O & W/DYE: CPT | Mod: 26

## 2025-01-26 PROCEDURE — 99233 SBSQ HOSP IP/OBS HIGH 50: CPT

## 2025-01-26 PROCEDURE — 93010 ELECTROCARDIOGRAM REPORT: CPT

## 2025-01-26 RX ORDER — OLANZAPINE 10 MG/1
2.5 TABLET, FILM COATED ORAL ONCE
Refills: 0 | Status: COMPLETED | OUTPATIENT
Start: 2025-01-26 | End: 2025-01-26

## 2025-01-26 RX ORDER — EZETIMIBE 10 MG
1 TABLET ORAL
Refills: 0 | DISCHARGE

## 2025-01-26 RX ORDER — ATORVASTATIN CALCIUM 80 MG/1
1 TABLET, FILM COATED ORAL
Refills: 0 | DISCHARGE

## 2025-01-26 RX ORDER — OLANZAPINE 10 MG/1
2.5 TABLET, FILM COATED ORAL EVERY 8 HOURS
Refills: 0 | Status: DISCONTINUED | OUTPATIENT
Start: 2025-01-26 | End: 2025-01-27

## 2025-01-26 RX ORDER — AMLODIPINE BESYLATE 5 MG
1 TABLET ORAL
Refills: 0 | DISCHARGE

## 2025-01-26 RX ORDER — METFORMIN HYDROCHLORIDE 1000 MG/1
1 TABLET, COATED ORAL
Refills: 0 | DISCHARGE

## 2025-01-26 RX ORDER — ACETAMINOPHEN 160 MG/5ML
1000 SUSPENSION ORAL ONCE
Refills: 0 | Status: COMPLETED | OUTPATIENT
Start: 2025-01-26 | End: 2025-01-26

## 2025-01-26 RX ORDER — METFORMIN HYDROCHLORIDE 1000 MG/1
2 TABLET, COATED ORAL
Refills: 0 | DISCHARGE

## 2025-01-26 RX ORDER — CARVEDILOL 6.25 MG
1 TABLET ORAL
Refills: 0 | DISCHARGE

## 2025-01-26 RX ORDER — QUETIAPINE FUMARATE 300 MG/1
25 TABLET ORAL AT BEDTIME
Refills: 0 | Status: DISCONTINUED | OUTPATIENT
Start: 2025-01-26 | End: 2025-01-27

## 2025-01-26 RX ADMIN — OLANZAPINE 2.5 MILLIGRAM(S): 10 TABLET, FILM COATED ORAL at 13:16

## 2025-01-26 RX ADMIN — Medication 6.25 MILLIGRAM(S): at 17:31

## 2025-01-26 RX ADMIN — QUETIAPINE FUMARATE 25 MILLIGRAM(S): 300 TABLET ORAL at 21:02

## 2025-01-26 RX ADMIN — SODIUM CHLORIDE 100 MILLILITER(S): 9 INJECTION, SOLUTION INTRAVENOUS at 07:00

## 2025-01-26 RX ADMIN — Medication 6.25 MILLIGRAM(S): at 09:13

## 2025-01-26 RX ADMIN — ACETAMINOPHEN 400 MILLIGRAM(S): 160 SUSPENSION ORAL at 01:17

## 2025-01-26 RX ADMIN — Medication 10 MILLIGRAM(S): at 09:14

## 2025-01-26 RX ADMIN — OLANZAPINE 2.5 MILLIGRAM(S): 10 TABLET, FILM COATED ORAL at 17:26

## 2025-01-26 RX ADMIN — Medication 5 MILLIGRAM(S): at 09:14

## 2025-01-26 RX ADMIN — OLANZAPINE 2.5 MILLIGRAM(S): 10 TABLET, FILM COATED ORAL at 23:22

## 2025-01-26 RX ADMIN — ATORVASTATIN CALCIUM 20 MILLIGRAM(S): 80 TABLET, FILM COATED ORAL at 21:02

## 2025-01-26 NOTE — PATIENT PROFILE ADULT - COMPLETE THE FOLLOWING IF THE PATIENT REFUSES THE INFLUENZA VACCINE:
79 Risks/benefits discussed with patient/surrogate/Vaccine Information Sheet (VIS) provided-VIS date: 8/6/21

## 2025-01-26 NOTE — PATIENT PROFILE ADULT - FALL HARM RISK - HARM RISK INTERVENTIONS
Assistance with ambulation/Assistance OOB with selected safe patient handling equipment/Communicate Risk of Fall with Harm to all staff/Discuss with provider need for PT consult/Monitor gait and stability/Reinforce activity limits and safety measures with patient and family/Tailored Fall Risk Interventions/Visual Cue: Yellow wristband and red socks/Bed in lowest position, wheels locked, appropriate side rails in place/Call bell, personal items and telephone in reach/Instruct patient to call for assistance before getting out of bed or chair/Non-slip footwear when patient is out of bed/[place] to call system/Physically safe environment - no spills, clutter or unnecessary equipment/Purposeful Proactive Rounding/Room/bathroom lighting operational, light cord in reach

## 2025-01-26 NOTE — PROGRESS NOTE ADULT - SUBJECTIVE AND OBJECTIVE BOX
Patient is a 79y old  Male who presents with a chief complaint of Agitation, AMS    SUBJECTIVE / OVERNIGHT EVENTS: Patient seen and examined at bedside. Patient admitted to medicine overnight. Without acute concerns today.    MEDICATIONS  (STANDING):  amLODIPine   Tablet 5 milliGRAM(s) Oral daily  atorvastatin 20 milliGRAM(s) Oral at bedtime  carvedilol 6.25 milliGRAM(s) Oral every 12 hours  dextrose 5%. 1000 milliLiter(s) (50 mL/Hr) IV Continuous <Continuous>  dextrose 5%. 1000 milliLiter(s) (100 mL/Hr) IV Continuous <Continuous>  dextrose 50% Injectable 25 Gram(s) IV Push once  dextrose 50% Injectable 12.5 Gram(s) IV Push once  dextrose 50% Injectable 25 Gram(s) IV Push once  ezetimibe 10 milliGRAM(s) Oral daily  glucagon  Injectable 1 milliGRAM(s) IntraMuscular once  insulin lispro (ADMELOG) corrective regimen sliding scale   SubCutaneous three times a day before meals  lactated ringers. 1000 milliLiter(s) (100 mL/Hr) IV Continuous <Continuous>    MEDICATIONS  (PRN):  acetaminophen     Tablet .. 650 milliGRAM(s) Oral every 6 hours PRN Temp greater or equal to 38C (100.4F), Mild Pain (1 - 3)  dextrose Oral Gel 15 Gram(s) Oral once PRN Blood Glucose LESS THAN 70 milliGRAM(s)/deciliter  melatonin 3 milliGRAM(s) Oral at bedtime PRN Insomnia  OLANZapine Injectable 2.5 milliGRAM(s) IntraMuscular every 8 hours PRN Agitation    Vital Signs Last 24 Hrs  T(C): 36.7 (26 Jan 2025 09:40), Max: 37.5 (26 Jan 2025 00:36)  T(F): 98.1 (26 Jan 2025 09:40), Max: 99.5 (26 Jan 2025 00:36)  HR: 107 (26 Jan 2025 09:40) (86 - 122)  BP: 176/84 (26 Jan 2025 09:40) (144/77 - 178/88)  BP(mean): --  RR: 17 (26 Jan 2025 09:40) (16 - 19)  SpO2: 100% (26 Jan 2025 09:40) (97% - 100%)    Parameters below as of 26 Jan 2025 09:40  Patient On (Oxygen Delivery Method): room air    CAPILLARY BLOOD GLUCOSE  POCT Blood Glucose.: 111 mg/dL (26 Jan 2025 13:14)  POCT Blood Glucose.: 134 mg/dL (26 Jan 2025 09:14)    I&O's Summary    PHYSICAL EXAM:  CONSTITUTIONAL: No apparent distress. Ill appearing.  EYES: PERRLA and symmetric  ENMT: Oral mucosa with moist membranes.  NECK: Supple, symmetric. No JVD.  RESP: No respiratory distress, no use of accessory muscles; CTA b/l, no WRR  CV: Tachycardic, +S1S2, no MRG  GI: Soft, NT, ND, no rebound, no guarding  : No suprapubic tenderness. No CVA tenderness.  LYMPH: No cervical LAD or tenderness  MSK: No spinal tenderness, normal muscle strength/tone  EXTREMITIES: No pedal edema  SKIN: No rashes or ulcers noted  NEURO: Lethargic not alert. Arousable to tactile stimuli.  PSYCH: Agitated, uncooperative    LABS:                        14.5   10.99 )-----------( 249      ( 26 Jan 2025 09:44 )             43.1     01-26    140  |  103  |  15  ----------------------------<  128[H]  4.1   |  22  |  0.88    Ca    9.6      26 Jan 2025 09:44  Phos  2.6     01-26  Mg     2.0     01-26    TPro  7.4  /  Alb  3.7  /  TBili  0.8  /  DBili  x   /  AST  55[H]  /  ALT  30  /  AlkPhos  88  01-26    Urinalysis Basic - ( 26 Jan 2025 09:44 )    Color: x / Appearance: x / SG: x / pH: x  Gluc: 128 mg/dL / Ketone: x  / Bili: x / Urobili: x   Blood: x / Protein: x / Nitrite: x   Leuk Esterase: x / RBC: x / WBC x   Sq Epi: x / Non Sq Epi: x / Bacteria: x    Armando Lee MD, Internal Medicine Resident

## 2025-01-27 DIAGNOSIS — Z29.9 ENCOUNTER FOR PROPHYLACTIC MEASURES, UNSPECIFIED: ICD-10-CM

## 2025-01-27 LAB
ALBUMIN SERPL ELPH-MCNC: 4.4 G/DL — SIGNIFICANT CHANGE UP (ref 3.3–5)
ALP SERPL-CCNC: 75 U/L — SIGNIFICANT CHANGE UP (ref 40–120)
ALT FLD-CCNC: 22 U/L — SIGNIFICANT CHANGE UP (ref 10–45)
ANION GAP SERPL CALC-SCNC: 15 MMOL/L — SIGNIFICANT CHANGE UP (ref 5–17)
APPEARANCE UR: CLEAR — SIGNIFICANT CHANGE UP
AST SERPL-CCNC: 40 U/L — SIGNIFICANT CHANGE UP (ref 10–40)
BACTERIA # UR AUTO: NEGATIVE /HPF — SIGNIFICANT CHANGE UP
BASOPHILS # BLD AUTO: 0.03 K/UL — SIGNIFICANT CHANGE UP (ref 0–0.2)
BASOPHILS NFR BLD AUTO: 0.3 % — SIGNIFICANT CHANGE UP (ref 0–2)
BILIRUB SERPL-MCNC: 0.7 MG/DL — SIGNIFICANT CHANGE UP (ref 0.2–1.2)
BILIRUB UR-MCNC: NEGATIVE — SIGNIFICANT CHANGE UP
BUN SERPL-MCNC: 10 MG/DL — SIGNIFICANT CHANGE UP (ref 7–23)
CALCIUM SERPL-MCNC: 9.3 MG/DL — SIGNIFICANT CHANGE UP (ref 8.4–10.5)
CAST: 1 /LPF — SIGNIFICANT CHANGE UP (ref 0–4)
CHLORIDE SERPL-SCNC: 101 MMOL/L — SIGNIFICANT CHANGE UP (ref 96–108)
CK SERPL-CCNC: 729 U/L — HIGH (ref 30–200)
CO2 SERPL-SCNC: 22 MMOL/L — SIGNIFICANT CHANGE UP (ref 22–31)
COLOR SPEC: YELLOW — SIGNIFICANT CHANGE UP
CREAT SERPL-MCNC: 0.86 MG/DL — SIGNIFICANT CHANGE UP (ref 0.5–1.3)
DIFF PNL FLD: NEGATIVE — SIGNIFICANT CHANGE UP
EGFR: 89 ML/MIN/1.73M2 — SIGNIFICANT CHANGE UP
EOSINOPHIL # BLD AUTO: 0.02 K/UL — SIGNIFICANT CHANGE UP (ref 0–0.5)
EOSINOPHIL NFR BLD AUTO: 0.2 % — SIGNIFICANT CHANGE UP (ref 0–6)
FOLATE SERPL-MCNC: 10.1 NG/ML — SIGNIFICANT CHANGE UP
GLUCOSE BLDC GLUCOMTR-MCNC: 103 MG/DL — HIGH (ref 70–99)
GLUCOSE BLDC GLUCOMTR-MCNC: 112 MG/DL — HIGH (ref 70–99)
GLUCOSE BLDC GLUCOMTR-MCNC: 124 MG/DL — HIGH (ref 70–99)
GLUCOSE BLDC GLUCOMTR-MCNC: 129 MG/DL — HIGH (ref 70–99)
GLUCOSE SERPL-MCNC: 139 MG/DL — HIGH (ref 70–99)
GLUCOSE UR QL: NEGATIVE MG/DL — SIGNIFICANT CHANGE UP
HCT VFR BLD CALC: 40.4 % — SIGNIFICANT CHANGE UP (ref 39–50)
HGB BLD-MCNC: 13.6 G/DL — SIGNIFICANT CHANGE UP (ref 13–17)
IMM GRANULOCYTES NFR BLD AUTO: 0.2 % — SIGNIFICANT CHANGE UP (ref 0–0.9)
KETONES UR-MCNC: 15 MG/DL
LEUKOCYTE ESTERASE UR-ACNC: NEGATIVE — SIGNIFICANT CHANGE UP
LYMPHOCYTES # BLD AUTO: 0.95 K/UL — LOW (ref 1–3.3)
LYMPHOCYTES # BLD AUTO: 10.1 % — LOW (ref 13–44)
MAGNESIUM SERPL-MCNC: 1.8 MG/DL — SIGNIFICANT CHANGE UP (ref 1.6–2.6)
MCHC RBC-ENTMCNC: 30.2 PG — SIGNIFICANT CHANGE UP (ref 27–34)
MCHC RBC-ENTMCNC: 33.7 G/DL — SIGNIFICANT CHANGE UP (ref 32–36)
MCV RBC AUTO: 89.6 FL — SIGNIFICANT CHANGE UP (ref 80–100)
MONOCYTES # BLD AUTO: 1.06 K/UL — HIGH (ref 0–0.9)
MONOCYTES NFR BLD AUTO: 11.3 % — SIGNIFICANT CHANGE UP (ref 2–14)
NEUTROPHILS # BLD AUTO: 7.31 K/UL — SIGNIFICANT CHANGE UP (ref 1.8–7.4)
NEUTROPHILS NFR BLD AUTO: 77.9 % — HIGH (ref 43–77)
NITRITE UR-MCNC: NEGATIVE — SIGNIFICANT CHANGE UP
NRBC # BLD: 0 /100 WBCS — SIGNIFICANT CHANGE UP (ref 0–0)
NRBC BLD-RTO: 0 /100 WBCS — SIGNIFICANT CHANGE UP (ref 0–0)
PH UR: 7 — SIGNIFICANT CHANGE UP (ref 5–8)
PHOSPHATE SERPL-MCNC: 2 MG/DL — LOW (ref 2.5–4.5)
PLATELET # BLD AUTO: 216 K/UL — SIGNIFICANT CHANGE UP (ref 150–400)
POTASSIUM SERPL-MCNC: 3.8 MMOL/L — SIGNIFICANT CHANGE UP (ref 3.5–5.3)
POTASSIUM SERPL-SCNC: 3.8 MMOL/L — SIGNIFICANT CHANGE UP (ref 3.5–5.3)
PROT SERPL-MCNC: 6.9 G/DL — SIGNIFICANT CHANGE UP (ref 6–8.3)
PROT UR-MCNC: SIGNIFICANT CHANGE UP MG/DL
RBC # BLD: 4.51 M/UL — SIGNIFICANT CHANGE UP (ref 4.2–5.8)
RBC # FLD: 13.1 % — SIGNIFICANT CHANGE UP (ref 10.3–14.5)
RBC CASTS # UR COMP ASSIST: 1 /HPF — SIGNIFICANT CHANGE UP (ref 0–4)
SODIUM SERPL-SCNC: 138 MMOL/L — SIGNIFICANT CHANGE UP (ref 135–145)
SP GR SPEC: 1.02 — SIGNIFICANT CHANGE UP (ref 1–1.03)
SQUAMOUS # UR AUTO: 0 /HPF — SIGNIFICANT CHANGE UP (ref 0–5)
T PALLIDUM AB TITR SER: NEGATIVE — SIGNIFICANT CHANGE UP
UROBILINOGEN FLD QL: 1 MG/DL — SIGNIFICANT CHANGE UP (ref 0.2–1)
VIT B12 SERPL-MCNC: 673 PG/ML — SIGNIFICANT CHANGE UP (ref 232–1245)
WBC # BLD: 9.39 K/UL — SIGNIFICANT CHANGE UP (ref 3.8–10.5)
WBC # FLD AUTO: 9.39 K/UL — SIGNIFICANT CHANGE UP (ref 3.8–10.5)
WBC UR QL: 1 /HPF — SIGNIFICANT CHANGE UP (ref 0–5)

## 2025-01-27 PROCEDURE — 99223 1ST HOSP IP/OBS HIGH 75: CPT

## 2025-01-27 PROCEDURE — 99232 SBSQ HOSP IP/OBS MODERATE 35: CPT | Mod: GC

## 2025-01-27 RX ORDER — QUETIAPINE FUMARATE 300 MG/1
12.5 TABLET ORAL EVERY 6 HOURS
Refills: 0 | Status: DISCONTINUED | OUTPATIENT
Start: 2025-01-27 | End: 2025-02-01

## 2025-01-27 RX ORDER — DIVALPROEX SODIUM 500 MG
250 TABLET, DELAYED RELEASE (ENTERIC COATED) ORAL
Refills: 0 | Status: DISCONTINUED | OUTPATIENT
Start: 2025-01-27 | End: 2025-02-01

## 2025-01-27 RX ORDER — OLANZAPINE 10 MG/1
5 TABLET, FILM COATED ORAL EVERY 8 HOURS
Refills: 0 | Status: DISCONTINUED | OUTPATIENT
Start: 2025-01-27 | End: 2025-01-27

## 2025-01-27 RX ORDER — QUETIAPINE FUMARATE 300 MG/1
25 TABLET ORAL DAILY
Refills: 0 | Status: DISCONTINUED | OUTPATIENT
Start: 2025-01-27 | End: 2025-01-27

## 2025-01-27 RX ORDER — OLANZAPINE 10 MG/1
2.5 TABLET, FILM COATED ORAL EVERY 6 HOURS
Refills: 0 | Status: DISCONTINUED | OUTPATIENT
Start: 2025-01-27 | End: 2025-01-28

## 2025-01-27 RX ORDER — QUETIAPINE FUMARATE 300 MG/1
12.5 TABLET ORAL DAILY
Refills: 0 | Status: DISCONTINUED | OUTPATIENT
Start: 2025-01-28 | End: 2025-01-29

## 2025-01-27 RX ORDER — SODIUM PHOSPHATE, DIBASIC, ANHYDROUS, POTASSIUM PHOSPHATE, MONOBASIC, AND SODIUM PHOSPHATE, MONOBASIC, MONOHYDRATE 852; 155; 130 MG/1; MG/1; MG/1
1 TABLET, COATED ORAL ONCE
Refills: 0 | Status: COMPLETED | OUTPATIENT
Start: 2025-01-27 | End: 2025-01-27

## 2025-01-27 RX ORDER — DIPHENHYDRAMINE HCL 25 MG
50 CAPSULE ORAL ONCE
Refills: 0 | Status: COMPLETED | OUTPATIENT
Start: 2025-01-27 | End: 2025-01-27

## 2025-01-27 RX ORDER — ACETAMINOPHEN 160 MG/5ML
1000 SUSPENSION ORAL ONCE
Refills: 0 | Status: COMPLETED | OUTPATIENT
Start: 2025-01-27 | End: 2025-01-27

## 2025-01-27 RX ORDER — OLANZAPINE 10 MG/1
5 TABLET, FILM COATED ORAL ONCE
Refills: 0 | Status: COMPLETED | OUTPATIENT
Start: 2025-01-27 | End: 2025-01-27

## 2025-01-27 RX ORDER — QUETIAPINE FUMARATE 300 MG/1
50 TABLET ORAL AT BEDTIME
Refills: 0 | Status: DISCONTINUED | OUTPATIENT
Start: 2025-01-27 | End: 2025-02-01

## 2025-01-27 RX ADMIN — QUETIAPINE FUMARATE 50 MILLIGRAM(S): 300 TABLET ORAL at 21:18

## 2025-01-27 RX ADMIN — ACETAMINOPHEN 400 MILLIGRAM(S): 160 SUSPENSION ORAL at 06:52

## 2025-01-27 RX ADMIN — OLANZAPINE 5 MILLIGRAM(S): 10 TABLET, FILM COATED ORAL at 01:20

## 2025-01-27 RX ADMIN — SODIUM CHLORIDE 100 MILLILITER(S): 9 INJECTION, SOLUTION INTRAVENOUS at 03:36

## 2025-01-27 RX ADMIN — Medication 6.25 MILLIGRAM(S): at 17:29

## 2025-01-27 RX ADMIN — Medication 250 MILLIGRAM(S): at 17:29

## 2025-01-27 RX ADMIN — Medication 6.25 MILLIGRAM(S): at 06:12

## 2025-01-27 RX ADMIN — Medication 5 MILLIGRAM(S): at 06:12

## 2025-01-27 RX ADMIN — SODIUM CHLORIDE 100 MILLILITER(S): 9 INJECTION, SOLUTION INTRAVENOUS at 23:18

## 2025-01-27 RX ADMIN — Medication 10 MILLIGRAM(S): at 21:18

## 2025-01-27 RX ADMIN — Medication 50 MILLIGRAM(S): at 04:36

## 2025-01-27 RX ADMIN — Medication 2 MILLIGRAM(S): at 04:36

## 2025-01-27 RX ADMIN — ACETAMINOPHEN 1000 MILLIGRAM(S): 160 SUSPENSION ORAL at 07:15

## 2025-01-27 RX ADMIN — ATORVASTATIN CALCIUM 20 MILLIGRAM(S): 80 TABLET, FILM COATED ORAL at 21:18

## 2025-01-27 NOTE — PROGRESS NOTE ADULT - PROBLEM SELECTOR PLAN 1
Agitation likely underlying LBD (below) and/or delirium  - F/u TSH  - F/u RVP  - LR@100  - Will re-orient at bedside as needed  - Social work and case management consult for placement/aid at home for patient and patient's family safety Agitation likely 2/2 to underlying vascular dementia in the setting of med noncompliance vs LBD (below) and/or delirium. Per family, chronic for 1 year and acutely worsened last 6 months with agitation, violence, personality changes, and visual hallucinations.   - F/u TSH - wnl  - F/u RVP - wnl  - Psych following, recs appreciated  - Will re-orient at bedside as needed  - Social work and case management consult for placement/aid at home for patient and patient's family safety  - Seroquel 12.5mg PO PRN, of Zyprexa 2.5mg q6h PRN if cannot tolerate PO  - Seroquel 50mg qD standing  - Depakote 250mg BID

## 2025-01-27 NOTE — PHYSICAL THERAPY INITIAL EVALUATION ADULT - NSPTDISCHREC_GEN_A_CORE
TBD if home, pt will benefit from home PT, hospital bed, total patient lift device, standard wheelchair with elevating legs rests and assistance for ALL mobility/ADLs/Sub-acute Rehab

## 2025-01-27 NOTE — PHYSICAL THERAPY INITIAL EVALUATION ADULT - TRANSFER TRAINING, PT EVAL
GOAL: Patient will perform sit to stand transfers with Gina and least restrictive deivce  with proper hand placement in 2 weeks

## 2025-01-27 NOTE — ADVANCED PRACTICE NURSE CONSULT - RECOMMEDATIONS
Impression       Urinary incontinence  Fecal Incontinence  Sacrum B/L Buttocks- deep tissue injury, present on admission.    Left & Right Foot - Dry and scaly skin             Recommendations       1. Bilateral sacrum/buttock - deep tissue injury             Topical therapy- sacral/bilateral buttocks- cleanse w/incontinent cleanser, pat dry & apply Triad twice daily & PRN Soiling. Monitor for changes        2. Right and Left heel           Elevate heels; apply Complete Cair air fluidized boots; ensure that the soles of the feet are not resting on the foot board of the bed.        Topical therapy - Recommended Sween 24 Moisturizing Cream to be applied to the affected areas [frequency; twice daily] to hydrate and soften the skin.          3. Incontinent management - incontinent cleanser, pads, vanda care BID            4. Maintain on an alternating air with low air loss surface             5. Turn & reposition every 2 hr; Use positioning pillow to turn and reposition, soft pillow between bony prominences; continue measures to decrease friction/shear/pressure.            6. Nutrition optimization.            7. Place waffle cushion when out of bed to chair     8. Bilateral toe nails elongated - Will request Podiatric consult for foot & nail care.

## 2025-01-27 NOTE — PROGRESS NOTE ADULT - PROBLEM SELECTOR PLAN 2
Based on family's description, high suspicion for lewy body dementia in patient  - Avoid benzos and first generation anti-psychotics including haldol  - If severely agitated, order one time doses of zyprexa  - Please obtain neurology consult for further eval and to establish care w/outpatient follow up  - R/o underlying delirium causes  - Can initiate donepezil 10mg if delirium generally ruled out prior to discharge Based on family's description, high suspicion for lewy body dementia in patient.    - MRI with chronic microvascular ischemic changes, possibly indicative of vascular dementia. Given changes can look similar to other dementia etiologies, will need FDG PET to r/o LDB  - Psych following for associated agitation, recs appreciated  - Please obtain neurology consult for further eval and to establish care w/outpatient follow up  - R/o underlying delirium causes  - Can initiate donepezil 10mg if delirium generally ruled out prior to discharge

## 2025-01-27 NOTE — BH CONSULTATION LIAISON ASSESSMENT NOTE - DETAILS
Patient's wife is unaware of any formally diagnosed psychiatric conditions in the family, she recalled that the patient’s father exhibited similar behaviors, including "walking around talking to himself".

## 2025-01-27 NOTE — PHYSICAL THERAPY INITIAL EVALUATION ADULT - LEVEL OF INDEPENDENCE: SIT/SUPINE, REHAB EVAL
PT evaluation deferred at this time as patient not medically appropriate per SRIDHAR Lau. Pt with multiple episodes of agitation over night requiring  multiple pushes of Zyprexa. PT evaluation deferred at this time as patient not medically appropriate per RN Judi. Pt with multiple episodes of agitation over night requiring  multiple pushes of Zyprexa./maximum assist (25% patients effort) maximum assist (25% patients effort)

## 2025-01-27 NOTE — BH CONSULTATION LIAISON ASSESSMENT NOTE - NSBHMSEHYG_PSY_A_CORE
Worsening renal failure, obtunded thought to be due to uremia versus sedating meds with worsening renal function requiring intubation and initiation of dialysis  Started on ceftriaxone to cover for aspiration prior to intubation [place]

## 2025-01-27 NOTE — BH CONSULTATION LIAISON ASSESSMENT NOTE - NSBHCONSULTMEDAGITATION_PSY_A_CORE FT
Seroquel 12.5 mg PO q6hrs PRN for agitation. If patient not taking PO or refractory to Seroquel, recommend Zyprexa 2.5 mg IM q6hrs PRN for agitation.

## 2025-01-27 NOTE — BH CONSULTATION LIAISON ASSESSMENT NOTE - ADDITIONAL PSYCHIATRIC MEDICATIONS
1/27: Ativan 2mg IVP, Diphenhydramine 50 mg IVP, Zyprexa 5mg IM  1/26: Zyprexa 5mg IM x3, Seroquel 25mg PO

## 2025-01-27 NOTE — PROGRESS NOTE ADULT - PROBLEM SELECTOR PLAN 6
- Re-introduce atorvastatin and zetia  - F/u lipid panel - Re-introduce atorvastatin and zetia  - F/u lipid panel - wnl

## 2025-01-27 NOTE — BH CONSULTATION LIAISON ASSESSMENT NOTE - NSBHCHARTREVIEWVS_PSY_A_CORE FT
Vital Signs Last 24 Hrs  T(C): 37.6 (27 Jan 2025 07:15), Max: 38.3 (27 Jan 2025 06:00)  T(F): 99.7 (27 Jan 2025 07:15), Max: 100.9 (27 Jan 2025 06:00)  HR: 118 (27 Jan 2025 06:00) (95 - 118)  BP: 155/106 (27 Jan 2025 06:00) (124/90 - 155/106)  BP(mean): --  RR: 20 (27 Jan 2025 06:00) (20 - 20)  SpO2: 95% (27 Jan 2025 06:00) (95% - 97%)    Parameters below as of 27 Jan 2025 06:00  Patient On (Oxygen Delivery Method): room air
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
## 2025-01-27 NOTE — BH CONSULTATION LIAISON ASSESSMENT NOTE - NSBHCONSULTFOLLOWAFTERCARE_PSY_A_CORE FT
Defer to Medical Team Pending disposition. Would benefit from seeing neurology for ongoing follow-up.

## 2025-01-27 NOTE — ADVANCED PRACTICE NURSE CONSULT - ASSESSMENT
Arrived on unit, patient was found lying in a low air loss pressure redistribution support surface style bed.  Mr. Charles was unable to turn independently, staff assist x 2 achieved turning, once turned, vanda care was provided, and able to view his skin. Patient with an external condom catheter for urinary diversion.     Skin assessment reveals; B/L Buttocks/ Sacrum non-blanchable deep red / maroon / purple discoloration, consistent with a deep tissue injury, size approximately 8.0 cm x 5.0 cm x 0.0 cm, present on admission. Cleansed with incontinence cleanser, pat dry, and applied Triad wound paste at bed side.  Discussed the importance to RN of close monitoring due to the potential for rapid deterioration and the development of a full-thickness wound, and strategies to completely offload pressure from the affected area. RN in understanding of same.     Bilateral heels with blanchable erythema and intact skin. No open wounds or ulcerations. Wearing Complete Cair air fluidized boots. Bilateral toe nails elongated with rough edges. Will recommend Podiatric consultation.  RNs were educated on the importance of turning and positioning every 2 hours, to minimize pressure on the affected area by repositioning the body and using pressure-relieving devices. The importance of keeping her skin clean and dry and to offload feet/heels, and optimal nutrition.       When the consultation was completed, the patient was left in a side lying position, with side rails up, call bell within reach, and bed in lowest position. Discussed plan of care with Bhanu (SRIDHAR).
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
## 2025-01-27 NOTE — BH CONSULTATION LIAISON ASSESSMENT NOTE - LEVEL OF CONSCIOUSNESS
Patient received Ativan 2mg IVP, Diphenhydramine 50 mg IVP, Zyprexa 5mg IM today/Lethargic, arousable to verbal stimulus

## 2025-01-27 NOTE — BH CONSULTATION LIAISON ASSESSMENT NOTE - RISK ASSESSMENT
At this time, patient is not at imminent risk of harm to self or others. Risk factors include hx of aggression/agitation, progressive cognitive decline, refusal of personal hygiene, hallucinations. Protective factors include: social support from family, no history of SI/SA, no history of substance abuse or legal issues.

## 2025-01-27 NOTE — BH CONSULTATION LIAISON ASSESSMENT NOTE - NSBHCHARTREVIEWLAB_PSY_A_CORE FT
LABS:                          13.6   9.39  )-----------( 216      ( 2025 07:03 )             40.4         138  |  101  |  10  ----------------------------<  139[H]  3.8   |  22  |  0.86    Ca    9.3      2025 07:02  Phos  2.0       Mg     1.8         TPro  6.9  /  Alb  4.4  /  TBili  0.7  /  DBili  x   /  AST  40  /  ALT  22  /  AlkPhos  75      LIVER FUNCTIONS - ( 2025 07:02 )  Alb: 4.4 g/dL / Pro: 6.9 g/dL / ALK PHOS: 75 U/L / ALT: 22 U/L / AST: 40 U/L / GGT: x             Urinalysis Basic - ( 2025 12:09 )    Color: Yellow / Appearance: Clear / S.020 / pH: x  Gluc: x / Ketone: 15 mg/dL  / Bili: Negative / Urobili: 1.0 mg/dL   Blood: x / Protein: Trace mg/dL / Nitrite: Negative   Leuk Esterase: Negative / RBC: 1 /HPF / WBC 1 /HPF   Sq Epi: x / Non Sq Epi: 0 /HPF / Bacteria: Negative /HPF

## 2025-01-27 NOTE — BH CONSULTATION LIAISON ASSESSMENT NOTE - CURRENT MEDICATION
MEDICATIONS  (STANDING):  amLODIPine   Tablet 5 milliGRAM(s) Oral daily  atorvastatin 20 milliGRAM(s) Oral at bedtime  carvedilol 6.25 milliGRAM(s) Oral every 12 hours  dextrose 5%. 1000 milliLiter(s) (50 mL/Hr) IV Continuous <Continuous>  dextrose 5%. 1000 milliLiter(s) (100 mL/Hr) IV Continuous <Continuous>  dextrose 50% Injectable 25 Gram(s) IV Push once  dextrose 50% Injectable 12.5 Gram(s) IV Push once  dextrose 50% Injectable 25 Gram(s) IV Push once  ezetimibe 10 milliGRAM(s) Oral daily  glucagon  Injectable 1 milliGRAM(s) IntraMuscular once  insulin lispro (ADMELOG) corrective regimen sliding scale   SubCutaneous three times a day before meals  lactated ringers. 1000 milliLiter(s) (100 mL/Hr) IV Continuous <Continuous>  potassium phosphate / sodium phosphate Powder (PHOS-NaK) 1 Packet(s) Oral once  QUEtiapine 50 milliGRAM(s) Oral at bedtime  QUEtiapine 25 milliGRAM(s) Oral daily    MEDICATIONS  (PRN):  acetaminophen     Tablet .. 650 milliGRAM(s) Oral every 6 hours PRN Temp greater or equal to 38C (100.4F), Mild Pain (1 - 3)  dextrose Oral Gel 15 Gram(s) Oral once PRN Blood Glucose LESS THAN 70 milliGRAM(s)/deciliter  melatonin 3 milliGRAM(s) Oral at bedtime PRN Insomnia  OLANZapine Injectable 5 milliGRAM(s) IntraMuscular every 8 hours PRN Agitation

## 2025-01-27 NOTE — PROGRESS NOTE ADULT - SUBJECTIVE AND OBJECTIVE BOX
Patient is a 79y old  Male who presents with a chief complaint of Agitation, AMS    SUBJECTIVE / OVERNIGHT EVENTS: Patient seen and examined at bedside. Overnight patient was very agitated requiring multiple pushes of Zyprexa, along with Ofirmev for temp to 100.9F.     MEDICATIONS  (STANDING):  amLODIPine   Tablet 5 milliGRAM(s) Oral daily  atorvastatin 20 milliGRAM(s) Oral at bedtime  carvedilol 6.25 milliGRAM(s) Oral every 12 hours  dextrose 5%. 1000 milliLiter(s) (50 mL/Hr) IV Continuous <Continuous>  dextrose 5%. 1000 milliLiter(s) (100 mL/Hr) IV Continuous <Continuous>  dextrose 50% Injectable 25 Gram(s) IV Push once  dextrose 50% Injectable 12.5 Gram(s) IV Push once  dextrose 50% Injectable 25 Gram(s) IV Push once  ezetimibe 10 milliGRAM(s) Oral daily  glucagon  Injectable 1 milliGRAM(s) IntraMuscular once  insulin lispro (ADMELOG) corrective regimen sliding scale   SubCutaneous three times a day before meals  lactated ringers. 1000 milliLiter(s) (100 mL/Hr) IV Continuous <Continuous>  QUEtiapine 25 milliGRAM(s) Oral at bedtime    MEDICATIONS  (PRN):  acetaminophen     Tablet .. 650 milliGRAM(s) Oral every 6 hours PRN Temp greater or equal to 38C (100.4F), Mild Pain (1 - 3)  dextrose Oral Gel 15 Gram(s) Oral once PRN Blood Glucose LESS THAN 70 milliGRAM(s)/deciliter  melatonin 3 milliGRAM(s) Oral at bedtime PRN Insomnia  OLANZapine Injectable 2.5 milliGRAM(s) IntraMuscular every 8 hours PRN Agitation    Vital Signs Last 24 Hrs  T(C): 37.6 (27 Jan 2025 07:15), Max: 38.3 (27 Jan 2025 06:00)  T(F): 99.7 (27 Jan 2025 07:15), Max: 100.9 (27 Jan 2025 06:00)  HR: 118 (27 Jan 2025 06:00) (95 - 118)  BP: 155/106 (27 Jan 2025 06:00) (124/90 - 176/84)  BP(mean): --  RR: 20 (27 Jan 2025 06:00) (17 - 20)  SpO2: 95% (27 Jan 2025 06:00) (95% - 100%)    Parameters below as of 27 Jan 2025 06:00  Patient On (Oxygen Delivery Method): room air    CAPILLARY BLOOD GLUCOSE  POCT Blood Glucose.: 119 mg/dL (26 Jan 2025 21:40)  POCT Blood Glucose.: 124 mg/dL (26 Jan 2025 17:25)  POCT Blood Glucose.: 111 mg/dL (26 Jan 2025 13:14)  POCT Blood Glucose.: 134 mg/dL (26 Jan 2025 09:14)    I&O's Summary    PHYSICAL EXAM:  CONSTITUTIONAL: No apparent distress. Ill appearing.  EYES: PERRLA and symmetric  ENMT: Oral mucosa with moist membranes.  NECK: Supple, symmetric. No JVD.  RESP: No respiratory distress, no use of accessory muscles; CTA b/l, no WRR  CV: Tachycardic, +S1S2, no MRG  GI: Soft, NT, ND, no rebound, no guarding  : No suprapubic tenderness. No CVA tenderness.  LYMPH: No cervical LAD or tenderness  MSK: No spinal tenderness, normal muscle strength/tone  EXTREMITIES: No pedal edema  SKIN: No rashes or ulcers noted  NEURO: Lethargic not alert. Arousable to tactile stimuli.  PSYCH: Agitated, uncooperative    LABS:                        13.6   9.39  )-----------( 216      ( 27 Jan 2025 07:03 )             40.4     01-26    140  |  103  |  15  ----------------------------<  128[H]  4.1   |  22  |  0.88    Ca    9.6      26 Jan 2025 09:44  Phos  2.6     01-26  Mg     2.0     01-26    TPro  7.4  /  Alb  3.7  /  TBili  0.8  /  DBili  x   /  AST  55[H]  /  ALT  30  /  AlkPhos  88  01-26    Urinalysis Basic - ( 26 Jan 2025 09:44 )    Color: x / Appearance: x / SG: x / pH: x  Gluc: 128 mg/dL / Ketone: x  / Bili: x / Urobili: x   Blood: x / Protein: x / Nitrite: x   Leuk Esterase: x / RBC: x / WBC x   Sq Epi: x / Non Sq Epi: x / Bacteria: x    Armando Lee MD, Internal Medicine Resident Patient is a 79y old  Male who presents with a chief complaint of Agitation, AMS    SUBJECTIVE / OVERNIGHT EVENTS: Patient seen and examined at bedside. Overnight patient was very agitated requiring multiple pushes of Zyprexa, along with Ofirmev for temp to 100.9F. This AM sleeping comfortably in bed, slightly difficult to arouse. Exam and interview limited due to patient cooperation.    MEDICATIONS  (STANDING):  amLODIPine   Tablet 5 milliGRAM(s) Oral daily  atorvastatin 20 milliGRAM(s) Oral at bedtime  carvedilol 6.25 milliGRAM(s) Oral every 12 hours  dextrose 5%. 1000 milliLiter(s) (50 mL/Hr) IV Continuous <Continuous>  dextrose 5%. 1000 milliLiter(s) (100 mL/Hr) IV Continuous <Continuous>  dextrose 50% Injectable 25 Gram(s) IV Push once  dextrose 50% Injectable 12.5 Gram(s) IV Push once  dextrose 50% Injectable 25 Gram(s) IV Push once  ezetimibe 10 milliGRAM(s) Oral daily  glucagon  Injectable 1 milliGRAM(s) IntraMuscular once  insulin lispro (ADMELOG) corrective regimen sliding scale   SubCutaneous three times a day before meals  lactated ringers. 1000 milliLiter(s) (100 mL/Hr) IV Continuous <Continuous>  QUEtiapine 25 milliGRAM(s) Oral at bedtime    MEDICATIONS  (PRN):  acetaminophen     Tablet .. 650 milliGRAM(s) Oral every 6 hours PRN Temp greater or equal to 38C (100.4F), Mild Pain (1 - 3)  dextrose Oral Gel 15 Gram(s) Oral once PRN Blood Glucose LESS THAN 70 milliGRAM(s)/deciliter  melatonin 3 milliGRAM(s) Oral at bedtime PRN Insomnia  OLANZapine Injectable 2.5 milliGRAM(s) IntraMuscular every 8 hours PRN Agitation    Vital Signs Last 24 Hrs  T(C): 37.6 (27 Jan 2025 07:15), Max: 38.3 (27 Jan 2025 06:00)  T(F): 99.7 (27 Jan 2025 07:15), Max: 100.9 (27 Jan 2025 06:00)  HR: 118 (27 Jan 2025 06:00) (95 - 118)  BP: 155/106 (27 Jan 2025 06:00) (124/90 - 176/84)  BP(mean): --  RR: 20 (27 Jan 2025 06:00) (17 - 20)  SpO2: 95% (27 Jan 2025 06:00) (95% - 100%)    Parameters below as of 27 Jan 2025 06:00  Patient On (Oxygen Delivery Method): room air    CAPILLARY BLOOD GLUCOSE  POCT Blood Glucose.: 119 mg/dL (26 Jan 2025 21:40)  POCT Blood Glucose.: 124 mg/dL (26 Jan 2025 17:25)  POCT Blood Glucose.: 111 mg/dL (26 Jan 2025 13:14)  POCT Blood Glucose.: 134 mg/dL (26 Jan 2025 09:14)    I&O's Summary    PHYSICAL EXAM:  CONSTITUTIONAL: No apparent distress. Ill appearing.  EYES: PERRLA and symmetric  ENMT: Oral mucosa with moist membranes.  NECK: Supple, symmetric. No JVD.  RESP: No respiratory distress, no use of accessory muscles; CTA b/l, no WRR  CV: +S1S2, no MRG  GI: Soft, NT, ND, no rebound, no guarding  : No suprapubic tenderness. No CVA tenderness.  LYMPH: No cervical LAD or tenderness  MSK: No spinal tenderness, normal muscle strength/tone  EXTREMITIES: No pedal edema  SKIN: No rashes or ulcers noted  NEURO: Lethargic not alert. Arousable to tactile stimuli.  PSYCH: Agitated, uncooperative    LABS:                        13.6   9.39  )-----------( 216      ( 27 Jan 2025 07:03 )             40.4     01-26    140  |  103  |  15  ----------------------------<  128[H]  4.1   |  22  |  0.88    Ca    9.6      26 Jan 2025 09:44  Phos  2.6     01-26  Mg     2.0     01-26    TPro  7.4  /  Alb  3.7  /  TBili  0.8  /  DBili  x   /  AST  55[H]  /  ALT  30  /  AlkPhos  88  01-26    Urinalysis Basic - ( 26 Jan 2025 09:44 )    Color: x / Appearance: x / SG: x / pH: x  Gluc: 128 mg/dL / Ketone: x  / Bili: x / Urobili: x   Blood: x / Protein: x / Nitrite: x   Leuk Esterase: x / RBC: x / WBC x   Sq Epi: x / Non Sq Epi: x / Bacteria: x    Armando Lee MD, Internal Medicine Resident
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
## 2025-01-27 NOTE — PROVIDER CONTACT NOTE (CHANGE IN STATUS NOTIFICATION) - SITUATION
Pt having a temp of 100.9 F, . /106
Pt very agitated and restless even after giving the prn dose of olanzapine 2.5 mg IM

## 2025-01-27 NOTE — PROVIDER CONTACT NOTE (CHANGE IN STATUS NOTIFICATION) - ASSESSMENT
Pt very agitated and restless even after giving the prn dose of olanzapine 2.5 mg IM. alert and oriented X 1
Pt having a temp of 100.9 F, . /106

## 2025-01-27 NOTE — PHYSICAL THERAPY INITIAL EVALUATION ADULT - ADDITIONAL COMMENTS
Per care coordinator note, patient lives in a private house with his wife. There is 1 step to enter + 13 steps to bedroom.
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
## 2025-01-27 NOTE — BH CONSULTATION LIAISON ASSESSMENT NOTE - SUMMARY
79 year-old   male, domiciled to house with spouse and daughter, with no noted PPH, no inpatient psychiatric admissions, history of SA/SI, legal history, or substance abuse. He presents due to worsening AMS and Psychiatry was consulted for agitation. The patient has a significant history of progressive cognitive decline, behavioral disturbances, and psychotic symptoms over the past year. His presentation further includes worsening agitation, hallucinations, disorganized behavior, incontinence, and refusal of hygiene. Additionally, there is a recent history of sundowning and threats toward family members, raising concerns for safety.

## 2025-01-27 NOTE — PHYSICAL THERAPY INITIAL EVALUATION ADULT - GAIT TRAINING, PT EVAL
Universal Safety Interventions
GOAL: Patient will ambulate 300 feet independently with Gina and least restrictive deivce in 2 weeks in order to return home safely.

## 2025-01-27 NOTE — BH CONSULTATION LIAISON ASSESSMENT NOTE - NSBHCONSULTRECOMMENDOTHER_PSY_A_CORE FT
- Recommend initiating standing Depakote 250 mg BID PO or IV for agitation and behavioral disturbances; will titrate as needed based on patient's response.  - Recommend standing Seroquel 12.5 mg PO daily @ 15:00 for agitation and behavioral changes in setting of sundowning.  - PRN medication for agitation as noted above.  - Agree with neuro consult.

## 2025-01-27 NOTE — PROGRESS NOTE ADULT - PROBLEM SELECTOR PLAN 3
Patient with mild rhabdomyolysis  - CK>2000  - F/u CK  - LR @100  - Fall risk Patient with mild elevation in CK, no end organ damage  - CK>2000; downtrending  - Fall risk

## 2025-01-28 ENCOUNTER — TRANSCRIPTION ENCOUNTER (OUTPATIENT)
Age: 80
End: 2025-01-28

## 2025-01-28 LAB
CULTURE RESULTS: SIGNIFICANT CHANGE UP
GLUCOSE BLDC GLUCOMTR-MCNC: 100 MG/DL — HIGH (ref 70–99)
GLUCOSE BLDC GLUCOMTR-MCNC: 102 MG/DL — HIGH (ref 70–99)
GLUCOSE BLDC GLUCOMTR-MCNC: 121 MG/DL — HIGH (ref 70–99)
GLUCOSE BLDC GLUCOMTR-MCNC: 155 MG/DL — HIGH (ref 70–99)
SPECIMEN SOURCE: SIGNIFICANT CHANGE UP

## 2025-01-28 PROCEDURE — 99232 SBSQ HOSP IP/OBS MODERATE 35: CPT

## 2025-01-28 PROCEDURE — 99232 SBSQ HOSP IP/OBS MODERATE 35: CPT | Mod: GC

## 2025-01-28 RX ADMIN — Medication 5 MILLIGRAM(S): at 06:23

## 2025-01-28 RX ADMIN — Medication 6.25 MILLIGRAM(S): at 17:37

## 2025-01-28 RX ADMIN — SODIUM CHLORIDE 100 MILLILITER(S): 9 INJECTION, SOLUTION INTRAVENOUS at 08:03

## 2025-01-28 RX ADMIN — QUETIAPINE FUMARATE 50 MILLIGRAM(S): 300 TABLET ORAL at 21:48

## 2025-01-28 RX ADMIN — Medication 250 MILLIGRAM(S): at 17:37

## 2025-01-28 RX ADMIN — Medication 250 MILLIGRAM(S): at 07:56

## 2025-01-28 RX ADMIN — Medication 10 MILLIGRAM(S): at 11:41

## 2025-01-28 RX ADMIN — ATORVASTATIN CALCIUM 20 MILLIGRAM(S): 80 TABLET, FILM COATED ORAL at 21:48

## 2025-01-28 RX ADMIN — Medication 6.25 MILLIGRAM(S): at 06:23

## 2025-01-28 RX ADMIN — SODIUM CHLORIDE 100 MILLILITER(S): 9 INJECTION, SOLUTION INTRAVENOUS at 19:36

## 2025-01-28 NOTE — PROGRESS NOTE ADULT - SUBJECTIVE AND OBJECTIVE BOX
Patient is a 79y old  Male who presents with a chief complaint of Agitation, AMS    SUBJECTIVE / OVERNIGHT EVENTS: Patient seen and examined at bedside. No overnight events.    MEDICATIONS  (STANDING):  amLODIPine   Tablet 5 milliGRAM(s) Oral daily  atorvastatin 20 milliGRAM(s) Oral at bedtime  carvedilol 6.25 milliGRAM(s) Oral every 12 hours  dextrose 5%. 1000 milliLiter(s) (50 mL/Hr) IV Continuous <Continuous>  dextrose 5%. 1000 milliLiter(s) (100 mL/Hr) IV Continuous <Continuous>  dextrose 50% Injectable 25 Gram(s) IV Push once  dextrose 50% Injectable 12.5 Gram(s) IV Push once  dextrose 50% Injectable 25 Gram(s) IV Push once  divalproex  milliGRAM(s) Oral two times a day  ezetimibe 10 milliGRAM(s) Oral daily  glucagon  Injectable 1 milliGRAM(s) IntraMuscular once  insulin lispro (ADMELOG) corrective regimen sliding scale   SubCutaneous three times a day before meals  lactated ringers. 1000 milliLiter(s) (100 mL/Hr) IV Continuous <Continuous>  QUEtiapine 50 milliGRAM(s) Oral at bedtime  QUEtiapine 12.5 milliGRAM(s) Oral daily    MEDICATIONS  (PRN):  acetaminophen     Tablet .. 650 milliGRAM(s) Oral every 6 hours PRN Temp greater or equal to 38C (100.4F), Mild Pain (1 - 3)  dextrose Oral Gel 15 Gram(s) Oral once PRN Blood Glucose LESS THAN 70 milliGRAM(s)/deciliter  melatonin 3 milliGRAM(s) Oral at bedtime PRN Insomnia  OLANZapine Injectable 2.5 milliGRAM(s) IntraMuscular every 6 hours PRN Agitation  QUEtiapine 12.5 milliGRAM(s) Oral every 6 hours PRN Agitation    Vital Signs Last 24 Hrs  T(C): 37.3 (2025 06:25), Max: 37.3 (2025 21:40)  T(F): 99.1 (2025 06:25), Max: 99.1 (2025 21:40)  HR: 101 (2025 06:25) (99 - 103)  BP: 175/91 (2025 06:25) (150/85 - 189/83)  BP(mean): --  RR: 18 (2025 06:25) (18 - 20)  SpO2: 98% (2025 06:25) (98% - 100%)    Parameters below as of 2025 21:40  Patient On (Oxygen Delivery Method): room air    CAPILLARY BLOOD GLUCOSE  POCT Blood Glucose.: 103 mg/dL (2025 21:47)  POCT Blood Glucose.: 112 mg/dL (2025 17:30)  POCT Blood Glucose.: 124 mg/dL (2025 12:50)  POCT Blood Glucose.: 129 mg/dL (2025 08:42)    I&O's Summary    2025 07:01  -  2025 07:00  --------------------------------------------------------  IN: 0 mL / OUT: 1300 mL / NET: -1300 mL    PHYSICAL EXAM:  CONSTITUTIONAL: No apparent distress. Ill appearing.  EYES: PERRLA and symmetric  ENMT: Oral mucosa with moist membranes.  NECK: Supple, symmetric. No JVD.  RESP: No respiratory distress, no use of accessory muscles; CTA b/l, no WRR  CV: +S1S2, no MRG  GI: Soft, NT, ND, no rebound, no guarding  : No suprapubic tenderness. No CVA tenderness.  LYMPH: No cervical LAD or tenderness  MSK: No spinal tenderness, normal muscle strength/tone  EXTREMITIES: No pedal edema  SKIN: No rashes or ulcers noted  NEURO: Lethargic not alert. Arousable to tactile stimuli.  PSYCH: Agitated, uncooperative    LABS:                        13.6   9.39  )-----------( 216      ( 2025 07:03 )             40.4         138  |  101  |  10  ----------------------------<  139[H]  3.8   |  22  |  0.86    Ca    9.3      2025 07:02  Phos  2.0       Mg     1.8         TPro  6.9  /  Alb  4.4  /  TBili  0.7  /  DBili  x   /  AST  40  /  ALT  22  /  AlkPhos  75      Urinalysis Basic - ( 2025 12:09 )    Color: Yellow / Appearance: Clear / S.020 / pH: x  Gluc: x / Ketone: 15 mg/dL  / Bili: Negative / Urobili: 1.0 mg/dL   Blood: x / Protein: Trace mg/dL / Nitrite: Negative   Leuk Esterase: Negative / RBC: 1 /HPF / WBC 1 /HPF   Sq Epi: x / Non Sq Epi: 0 /HPF / Bacteria: Negative /HPF    Armando Lee MD, Internal Medicine Resident Patient is a 79y old  Male who presents with a chief complaint of Agitation, AMS    SUBJECTIVE / OVERNIGHT EVENTS: Patient seen and examined at bedside. No overnight events. Patient laying comfortably in bed this AM, without acute medical concerns.    MEDICATIONS  (STANDING):  amLODIPine   Tablet 5 milliGRAM(s) Oral daily  atorvastatin 20 milliGRAM(s) Oral at bedtime  carvedilol 6.25 milliGRAM(s) Oral every 12 hours  dextrose 5%. 1000 milliLiter(s) (50 mL/Hr) IV Continuous <Continuous>  dextrose 5%. 1000 milliLiter(s) (100 mL/Hr) IV Continuous <Continuous>  dextrose 50% Injectable 25 Gram(s) IV Push once  dextrose 50% Injectable 12.5 Gram(s) IV Push once  dextrose 50% Injectable 25 Gram(s) IV Push once  divalproex  milliGRAM(s) Oral two times a day  ezetimibe 10 milliGRAM(s) Oral daily  glucagon  Injectable 1 milliGRAM(s) IntraMuscular once  insulin lispro (ADMELOG) corrective regimen sliding scale   SubCutaneous three times a day before meals  lactated ringers. 1000 milliLiter(s) (100 mL/Hr) IV Continuous <Continuous>  QUEtiapine 50 milliGRAM(s) Oral at bedtime  QUEtiapine 12.5 milliGRAM(s) Oral daily    MEDICATIONS  (PRN):  acetaminophen     Tablet .. 650 milliGRAM(s) Oral every 6 hours PRN Temp greater or equal to 38C (100.4F), Mild Pain (1 - 3)  dextrose Oral Gel 15 Gram(s) Oral once PRN Blood Glucose LESS THAN 70 milliGRAM(s)/deciliter  melatonin 3 milliGRAM(s) Oral at bedtime PRN Insomnia  OLANZapine Injectable 2.5 milliGRAM(s) IntraMuscular every 6 hours PRN Agitation  QUEtiapine 12.5 milliGRAM(s) Oral every 6 hours PRN Agitation    Vital Signs Last 24 Hrs  T(C): 37.3 (2025 06:25), Max: 37.3 (2025 21:40)  T(F): 99.1 (2025 06:25), Max: 99.1 (2025 21:40)  HR: 101 (2025 06:25) (99 - 103)  BP: 175/91 (2025 06:25) (150/85 - 189/83)  BP(mean): --  RR: 18 (2025 06:25) (18 - 20)  SpO2: 98% (2025 06:25) (98% - 100%)    Parameters below as of 2025 21:40  Patient On (Oxygen Delivery Method): room air    CAPILLARY BLOOD GLUCOSE  POCT Blood Glucose.: 103 mg/dL (2025 21:47)  POCT Blood Glucose.: 112 mg/dL (2025 17:30)  POCT Blood Glucose.: 124 mg/dL (2025 12:50)  POCT Blood Glucose.: 129 mg/dL (2025 08:42)    I&O's Summary    2025 07:01  -  2025 07:00  --------------------------------------------------------  IN: 0 mL / OUT: 1300 mL / NET: -1300 mL    PHYSICAL EXAM:  CONSTITUTIONAL: No apparent distress. Ill appearing.  EYES: PERRLA and symmetric  ENMT: Oral mucosa with moist membranes.  NECK: Supple, symmetric. No JVD.  RESP: No respiratory distress, no use of accessory muscles; CTA b/l, no WRR  CV: +S1S2, no MRG  GI: Soft, NT, ND, no rebound, no guarding  : No suprapubic tenderness. No CVA tenderness.  LYMPH: No cervical LAD or tenderness  MSK: No spinal tenderness, normal muscle strength/tone  EXTREMITIES: No pedal edema  SKIN: No rashes or ulcers noted  NEURO: Lethargic not alert. Arousable to tactile stimuli.  PSYCH: Agitated, uncooperative    LABS:                        13.6   9.39  )-----------( 216      ( 2025 07:03 )             40.4         138  |  101  |  10  ----------------------------<  139[H]  3.8   |  22  |  0.86    Ca    9.3      2025 07:02  Phos  2.0       Mg     1.8         TPro  6.9  /  Alb  4.4  /  TBili  0.7  /  DBili  x   /  AST  40  /  ALT  22  /  AlkPhos  75      Urinalysis Basic - ( 2025 12:09 )    Color: Yellow / Appearance: Clear / S.020 / pH: x  Gluc: x / Ketone: 15 mg/dL  / Bili: Negative / Urobili: 1.0 mg/dL   Blood: x / Protein: Trace mg/dL / Nitrite: Negative   Leuk Esterase: Negative / RBC: 1 /HPF / WBC 1 /HPF   Sq Epi: x / Non Sq Epi: 0 /HPF / Bacteria: Negative /HPF    Armando Lee MD, Internal Medicine Resident Patient is a 79y old  Male who presents with a chief complaint of Agitation, AMS    SUBJECTIVE / OVERNIGHT EVENTS: Patient seen and examined at bedside. No overnight events. Patient laying comfortably in bed this AM, without acute medical concerns. Declines physical exam today although does not reports any symptoms on review of systems.    MEDICATIONS  (STANDING):  amLODIPine   Tablet 5 milliGRAM(s) Oral daily  atorvastatin 20 milliGRAM(s) Oral at bedtime  carvedilol 6.25 milliGRAM(s) Oral every 12 hours  dextrose 5%. 1000 milliLiter(s) (50 mL/Hr) IV Continuous <Continuous>  dextrose 5%. 1000 milliLiter(s) (100 mL/Hr) IV Continuous <Continuous>  dextrose 50% Injectable 25 Gram(s) IV Push once  dextrose 50% Injectable 12.5 Gram(s) IV Push once  dextrose 50% Injectable 25 Gram(s) IV Push once  divalproex  milliGRAM(s) Oral two times a day  ezetimibe 10 milliGRAM(s) Oral daily  glucagon  Injectable 1 milliGRAM(s) IntraMuscular once  insulin lispro (ADMELOG) corrective regimen sliding scale   SubCutaneous three times a day before meals  lactated ringers. 1000 milliLiter(s) (100 mL/Hr) IV Continuous <Continuous>  QUEtiapine 50 milliGRAM(s) Oral at bedtime  QUEtiapine 12.5 milliGRAM(s) Oral daily    MEDICATIONS  (PRN):  acetaminophen     Tablet .. 650 milliGRAM(s) Oral every 6 hours PRN Temp greater or equal to 38C (100.4F), Mild Pain (1 - 3)  dextrose Oral Gel 15 Gram(s) Oral once PRN Blood Glucose LESS THAN 70 milliGRAM(s)/deciliter  melatonin 3 milliGRAM(s) Oral at bedtime PRN Insomnia  OLANZapine Injectable 2.5 milliGRAM(s) IntraMuscular every 6 hours PRN Agitation  QUEtiapine 12.5 milliGRAM(s) Oral every 6 hours PRN Agitation    Vital Signs Last 24 Hrs  T(C): 37.3 (2025 06:25), Max: 37.3 (2025 21:40)  T(F): 99.1 (2025 06:25), Max: 99.1 (2025 21:40)  HR: 101 (2025 06:25) (99 - 103)  BP: 175/91 (2025 06:25) (150/85 - 189/83)  BP(mean): --  RR: 18 (2025 06:25) (18 - 20)  SpO2: 98% (2025 06:25) (98% - 100%)    Parameters below as of 2025 21:40  Patient On (Oxygen Delivery Method): room air    CAPILLARY BLOOD GLUCOSE  POCT Blood Glucose.: 103 mg/dL (2025 21:47)  POCT Blood Glucose.: 112 mg/dL (2025 17:30)  POCT Blood Glucose.: 124 mg/dL (2025 12:50)  POCT Blood Glucose.: 129 mg/dL (2025 08:42)    I&O's Summary    2025 07:01  -  2025 07:00  --------------------------------------------------------  IN: 0 mL / OUT: 1300 mL / NET: -1300 mL    PHYSICAL EXAM:  CONSTITUTIONAL: Lying in bed. No apparent distress.  EYES: PERRLA and symmetric  ENMT: Oral mucosa with moist membranes.  NECK: Supple, symmetric. No JVD.  RESP: No respiratory distress, no use of accessory muscles; CTA b/l, no WRR  CV: +S1S2, no MRG  GI: Soft, NT, ND, no rebound, no guarding  : No suprapubic tenderness. No CVA tenderness.  LYMPH: No cervical LAD or tenderness  MSK: No spinal tenderness, normal muscle strength/tone  EXTREMITIES: No pedal edema  SKIN: No rashes or ulcers noted  NEURO: Lethargic not alert. Arousable to tactile stimuli.  PSYCH: Agitated, uncooperative    LABS:                        13.6   9.39  )-----------( 216      ( 2025 07:03 )             40.4         138  |  101  |  10  ----------------------------<  139[H]  3.8   |  22  |  0.86    Ca    9.3      2025 07:02  Phos  2.0       Mg     1.8         TPro  6.9  /  Alb  4.4  /  TBili  0.7  /  DBili  x   /  AST  40  /  ALT  22  /  AlkPhos  75      Urinalysis Basic - ( 2025 12:09 )    Color: Yellow / Appearance: Clear / S.020 / pH: x  Gluc: x / Ketone: 15 mg/dL  / Bili: Negative / Urobili: 1.0 mg/dL   Blood: x / Protein: Trace mg/dL / Nitrite: Negative   Leuk Esterase: Negative / RBC: 1 /HPF / WBC 1 /HPF   Sq Epi: x / Non Sq Epi: 0 /HPF / Bacteria: Negative /HPF    Armando Lee MD, Internal Medicine Resident

## 2025-01-28 NOTE — DIETITIAN INITIAL EVALUATION ADULT - PERTINENT MEDS FT
MEDICATIONS  (STANDING):  amLODIPine   Tablet 5 milliGRAM(s) Oral daily  atorvastatin 20 milliGRAM(s) Oral at bedtime  carvedilol 6.25 milliGRAM(s) Oral every 12 hours  dextrose 5%. 1000 milliLiter(s) (50 mL/Hr) IV Continuous <Continuous>  dextrose 5%. 1000 milliLiter(s) (100 mL/Hr) IV Continuous <Continuous>  dextrose 50% Injectable 25 Gram(s) IV Push once  dextrose 50% Injectable 12.5 Gram(s) IV Push once  dextrose 50% Injectable 25 Gram(s) IV Push once  divalproex  milliGRAM(s) Oral two times a day  ezetimibe 10 milliGRAM(s) Oral daily  glucagon  Injectable 1 milliGRAM(s) IntraMuscular once  insulin lispro (ADMELOG) corrective regimen sliding scale   SubCutaneous three times a day before meals  lactated ringers. 1000 milliLiter(s) (100 mL/Hr) IV Continuous <Continuous>  QUEtiapine 50 milliGRAM(s) Oral at bedtime  QUEtiapine 12.5 milliGRAM(s) Oral daily    MEDICATIONS  (PRN):  acetaminophen     Tablet .. 650 milliGRAM(s) Oral every 6 hours PRN Temp greater or equal to 38C (100.4F), Mild Pain (1 - 3)  dextrose Oral Gel 15 Gram(s) Oral once PRN Blood Glucose LESS THAN 70 milliGRAM(s)/deciliter  melatonin 3 milliGRAM(s) Oral at bedtime PRN Insomnia  QUEtiapine 12.5 milliGRAM(s) Oral every 6 hours PRN Agitation

## 2025-01-28 NOTE — DISCHARGE NOTE PROVIDER - NSDCACTIVITY_GEN_ALL_CORE
Activity as tolerated Do not drive or operate machinery/Do not make important decisions/No heavy lifting/straining
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
## 2025-01-28 NOTE — DISCHARGE NOTE PROVIDER - NSDCCPTREATMENT_GEN_ALL_CORE_FT
Quality 431: Preventive Care And Screening: Unhealthy Alcohol Use - Screening: Patient screened for unhealthy alcohol use using a single question and scores less than 2 times per year Quality 226: Preventive Care And Screening: Tobacco Use: Screening And Cessation Intervention: Patient screened for tobacco use and is an ex/non-smoker Quality 130: Documentation Of Current Medications In The Medical Record: Current Medications Documented Detail Level: Detailed Quality 47: Advance Care Plan: Advance Care Planning discussed and documented; advance care plan or surrogate decision maker documented in the medical record. PRINCIPAL PROCEDURE  Procedure: MR brain wo/w con  Findings and Treatment: FINDINGS:  Diagnostic accuracy is limited secondary to patient motion.  VENTRICLES AND SULCI:  Normal.  INTRA-AXIAL:  No definite acute intracranial hemorrhage, midline shift or   evidence of acute cerebral ischemia. No abnormal enhancement. Focus of   susceptibility in the left parietal lobe likely related to hemosiderin   deposition. There are patchy and confluent foci of hyperintense T2 signal   within the subcortical and periventricular white matter which are   nonspecific but likely related to chronic microvascular ischemic disease.  EXTRA-AXIAL:  No mass or collection.  VISUALIZED SINUSES: Mild mucosal thickening.  VISUALIZED MASTOIDS:  Clear.  CALVARIUM:  Normal.  CAROTID FLOW VOIDS: Normal.  MISCELLANEOUS:  None.  IMPRESSION: MOTION LIMITED STUDY. NO DEFINITE EVIDENCE OF INTRACRANIAL   HEMORRHAGE, ACUTE TERRITORIAL INFARCT OR AREA OF ABNORMAL ENHANCEMENT.  --- End of Report ---

## 2025-01-28 NOTE — BH CONSULTATION LIAISON PROGRESS NOTE - NSBHCONSULTFOLLOWAFTERCARE_PSY_A_CORE FT
Defer to Medical Team Recommend follow-up with neurology and/or neuropsychiatry or geriatric psychiatry at discharge.  Schedule an outpatient appointment at [facility] by calling [phone]  Can follow-up with geriatric psychiatry by calling [phone]

## 2025-01-28 NOTE — PROGRESS NOTE ADULT - PROBLEM SELECTOR PLAN 1
Agitation likely 2/2 to underlying vascular dementia in the setting of med noncompliance vs LBD (below) and/or delirium. Per family, chronic for 1 year and acutely worsened last 6 months with agitation, violence, personality changes, and visual hallucinations.   - F/u TSH - wnl  - F/u RVP - wnl  - Psych following, recs appreciated  - Will re-orient at bedside as needed  - Social work and case management consult for placement/aid at home for patient and patient's family safety  - Seroquel 12.5mg PO PRN, of Zyprexa 2.5mg q6h PRN if cannot tolerate PO  - Seroquel 50mg qD standing  - Depakote 250mg BID Agitation likely 2/2 to underlying vascular dementia in the setting of med noncompliance vs LBD (below) and/or delirium. Per family, chronic for 1 year and acutely worsened last 6 months with agitation, violence, personality changes, and visual hallucinations.   - F/u TSH - wnl  - F/u RVP - wnl  - Psych following, recs appreciated  - Will re-orient at bedside as needed  - Social work and case management consult for placement/aid at home for patient and patient's family safety  - Seroquel 12.5mg PO PRN, of Zyprexa 2.5mg q6h IV PRN if cannot tolerate PO  - Seroquel 50mg qD standing  - Depakote 250mg BID

## 2025-01-28 NOTE — DISCHARGE NOTE PROVIDER - NSFOLLOWUPCLINICS_GEN_ALL_ED_FT
[facility]  Neurology  [address] [place] [address] [address] [place] [address]  Phone: [phone]  Fax:   Follow Up Time: 1 month

## 2025-01-28 NOTE — DISCHARGE NOTE PROVIDER - CARE PROVIDER_API CALL
Quinn Boss  Cardiology  [address], [address]  [place] [address]  Phone: [phone]  Fax: [phone]  Established Patient  Follow Up Time: 1 week

## 2025-01-28 NOTE — BH CONSULTATION LIAISON PROGRESS NOTE - NSBHCONSULTRECOMMENDOTHER_PSY_A_CORE FT
- Recommend initiating standing Depakote 250 mg BID PO or IV for agitation and behavioral disturbances; will titrate as needed based on patient's response.  - Recommend standing Seroquel 12.5 mg PO daily @ 15:00 for agitation and behavioral changes in setting of sundowning.  - PRN medications as listed above.  - Recommend non-pharmacological interventions to prevent/treat delirium: maintain day/night light cycles, optimize sleep-wake cycle, minimize environmental noise, reorientation frequently, use verbal redirection as first line, minimize restraints and lines, ensure adequate pain control; minimize use of anticholinergic, antihistaminic, and benzodiazepine medications.  - Recommend continuing Depakote 250 mg BID PO or IV for agitation and behavioral disturbances; will titrate as needed based on patient's response.  - Recommend standing Seroquel 12.5 mg PO daily @ 15:00 for agitation and behavioral changes in setting of sundowning.  - PRN medications as listed above.  - Recommend non-pharmacological interventions to prevent/treat delirium: maintain day/night light cycles, optimize sleep-wake cycle, minimize environmental noise, reorientation frequently, use verbal redirection as first line, minimize restraints and lines, ensure adequate pain control; minimize use of anticholinergic, antihistaminic, and benzodiazepine medications.

## 2025-01-28 NOTE — DISCHARGE NOTE PROVIDER - NSDCFUADDAPPT_GEN_ALL_CORE_FT
APPTS ARE READY TO BE MADE: [ ] YES    Best Family or Patient Contact (if needed):  Additional Information about above appointments (if needed):  1: PCP in 1-2 weeks  2: Neurology in 1-2 weeks for formal dementia evaluation  3:     Other comments or requests:

## 2025-01-28 NOTE — DIETITIAN INITIAL EVALUATION ADULT - PERTINENT LABORATORY DATA
01-27    138  |  101  |  10  ----------------------------<  139[H]  3.8   |  22  |  0.86    Ca    9.3      27 Jan 2025 07:02  Phos  2.0     01-27  Mg     1.8     01-27    TPro  6.9  /  Alb  4.4  /  TBili  0.7  /  DBili  x   /  AST  40  /  ALT  22  /  AlkPhos  75  01-27  POCT Blood Glucose.: 102 mg/dL (01-28-25 @ 11:44)  A1C with Estimated Average Glucose Result: 6.7 % (01-26-25 @ 09:44)

## 2025-01-28 NOTE — DISCHARGE NOTE PROVIDER - NSDCCPCAREPLAN_GEN_ALL_CORE_FT
PRINCIPAL DISCHARGE DIAGNOSIS  Diagnosis: Dementia  Assessment and Plan of Treatment: Your loved one presented to the hospital with agitation and confusion, which he had also been experiencing over the past few months. Certain types of dementia can present this way, but the formal diagnosis of dementia can only be made with a comprehensive outpatient evaluation. Please make sure to follow-up with a neurolgist as your loved one may need further specialized testing.      SECONDARY DISCHARGE DIAGNOSES  Diagnosis: Agitation  Assessment and Plan of Treatment: Due to his symptoms of agitation, we started your loved one on medications that are mildly sedating but should help to better control his agitation. These medications were started and adjusted with the help of our behavioral health team. Please make sure to present for re-evaluation as he may need further medication adjustments.

## 2025-01-28 NOTE — DISCHARGE NOTE PROVIDER - NSDCMRMEDTOKEN_GEN_ALL_CORE_FT
amLODIPine 5 mg oral tablet: 1 tab(s) orally once a day  atorvastatin 20 mg oral tablet: 1 tab(s) orally once a day (at bedtime)  carvedilol 6.25 mg oral tablet: 1 tab(s) orally 2 times a day  ezetimibe 10 mg oral tablet: 1 tab(s) orally once a day  metFORMIN 500 mg oral tablet: 2 tab(s) orally 2 times a day   acetaminophen 325 mg oral tablet: 2 tab(s) orally every 6 hours As needed Temp greater or equal to 38C (100.4F), Mild Pain (1 - 3)  amLODIPine 5 mg oral tablet: 1 tab(s) orally once a day  atorvastatin 20 mg oral tablet: 1 tab(s) orally once a day (at bedtime)  carvedilol 6.25 mg oral tablet: 1 tab(s) orally every 12 hours  divalproex sodium 250 mg oral delayed release tablet: 1 tab(s) orally 2 times a day  ezetimibe 10 mg oral tablet: 1 tab(s) orally once a day  melatonin 3 mg oral tablet: 1 tab(s) orally once a day (at bedtime) As needed Insomnia  metFORMIN 500 mg oral tablet: 2 tab(s) orally 2 times a day  polyethylene glycol 3350 oral powder for reconstitution: 17 gram(s) orally once a day  QUEtiapine 25 mg oral tablet: 0.5 tab(s) orally once a day To be given at 3PM, or 30-60 minutes prior to expected sundowning.  QUEtiapine 50 mg oral tablet: 1 tab(s) orally once a day (at bedtime)   acetaminophen 325 mg oral tablet: 2 tab(s) orally every 6 hours As needed Temp greater or equal to 38C (100.4F), Mild Pain (1 - 3)  amLODIPine 5 mg oral tablet: 1 tab(s) orally once a day  atorvastatin 20 mg oral tablet: 1 tab(s) orally once a day (at bedtime)  carvedilol 6.25 mg oral tablet: 1 tab(s) orally every 12 hours  divalproex sodium 250 mg oral delayed release tablet: 1 tab(s) orally 2 times a day  ezetimibe 10 mg oral tablet: 1 tab(s) orally once a day  melatonin 3 mg oral tablet: 1 tab(s) orally once a day (at bedtime) As needed Insomnia  metFORMIN 500 mg oral tablet: 2 tab(s) orally 2 times a day  pantoprazole 40 mg oral delayed release tablet: 1 tab(s) orally once a day  polyethylene glycol 3350 oral powder for reconstitution: 17 gram(s) orally once a day  QUEtiapine 25 mg oral tablet: 0.5 tab(s) orally once a day To be given at 3PM, or 30-60 minutes prior to expected sundowning.  QUEtiapine 25 mg oral tablet: 0.5 tab(s) orally every 6 hours as needed for  agitation  QUEtiapine 50 mg oral tablet: 1 tab(s) orally once a day (at bedtime)
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
## 2025-01-28 NOTE — BH CONSULTATION LIAISON PROGRESS NOTE - NSBHFUPINTERVALHXFT_PSY_A_CORE
Patient becomes agitated and restless upon staff initiating care - received Seroquel 50 mg x1 overnight at 9 PM. Since initial assessment yesterday, patient has been started on standing Depakote 250 mg BID - has received 2 doses thus far. Patient becomes agitated and restless upon staff initiating care - received Seroquel 50 mg x1 overnight at 9 PM. Since initial assessment yesterday, patient has been started on standing Depakote 250 mg BID - has received 2 doses thus far. Spoke with RN who noted patient was restless overnight and did not sleep well; also acknowledges the patient becomes agitated when staff initiates care.     Upon interview with patient, he appears more awake and cooperative today, though not oriented to place ([facility]) or time (July). He notes he didn't sleep well last night and is tired, however admits to being in a better mood today.

## 2025-01-28 NOTE — DIETITIAN INITIAL EVALUATION ADULT - NUTRITIONGOAL OUTCOME1
Breath sounds clear and equal bilaterally.
pt will meet at least 50% of nutrient needs via meals and supplements

## 2025-01-28 NOTE — PROGRESS NOTE ADULT - PROBLEM SELECTOR PLAN 2
Based on family's description, high suspicion for lewy body dementia in patient.    - MRI with chronic microvascular ischemic changes, possibly indicative of vascular dementia. Given changes can look similar to other dementia etiologies, will need FDG PET to r/o LDB  - Psych following for associated agitation, recs appreciated  - Please obtain neurology consult for further eval and to establish care w/outpatient follow up  - R/o underlying delirium causes  - Can initiate donepezil 10mg if delirium generally ruled out prior to discharge

## 2025-01-28 NOTE — DIETITIAN INITIAL EVALUATION ADULT - NS FNS DIET ORDER
Diet, Soft and Bite Sized:   Consistent Carbohydrate {Evening Snack} (CSTCHOSN) (01-26-25 @ 12:32) [Active]

## 2025-01-28 NOTE — DIETITIAN INITIAL EVALUATION ADULT - NSFNSPHYEXAMSKINFT_GEN_A_CORE
Pressure Injury 1: Bilateral:, buttocks, Suspected deep tissue injury  Pressure Injury 2: none, none  Pressure Injury 3: none, none  Pressure Injury 4: none, none  Pressure Injury 5: none, none  Pressure Injury 6: none, none  Pressure Injury 7: none, none  Pressure Injury 8: none, none  Pressure Injury 9: none, none  Pressure Injury 10: none, none  Pressure Injury 11: none, none, Pressure Injury 1: Bilateral:, buttocks/sacrum, Suspected deep tissue injury  Pressure Injury 2: none, none  Pressure Injury 3: none, none  Pressure Injury 4: none, none  Pressure Injury 5: none, none  Pressure Injury 6: none, none  Pressure Injury 7: none, none  Pressure Injury 8: none, none  Pressure Injury 9: none, none  Pressure Injury 10: none, none  Pressure Injury 11: none, none, Pressure Injury 1: Bilateral:, buttocks, sacrum, Suspected deep tissue injury  Pressure Injury 2: none, none  Pressure Injury 3: none, none  Pressure Injury 4: none, none  Pressure Injury 5: none, none  Pressure Injury 6: none, none  Pressure Injury 7: none, none  Pressure Injury 8: none, none  Pressure Injury 9: none, none  Pressure Injury 10: none, none  Pressure Injury 11: none, none

## 2025-01-29 LAB
ALBUMIN SERPL ELPH-MCNC: 3.4 G/DL — SIGNIFICANT CHANGE UP (ref 3.3–5)
ALP SERPL-CCNC: 72 U/L — SIGNIFICANT CHANGE UP (ref 40–120)
ALT FLD-CCNC: 20 U/L — SIGNIFICANT CHANGE UP (ref 10–45)
ANION GAP SERPL CALC-SCNC: 13 MMOL/L — SIGNIFICANT CHANGE UP (ref 5–17)
AST SERPL-CCNC: 22 U/L — SIGNIFICANT CHANGE UP (ref 10–40)
BILIRUB SERPL-MCNC: 0.5 MG/DL — SIGNIFICANT CHANGE UP (ref 0.2–1.2)
BUN SERPL-MCNC: 11 MG/DL — SIGNIFICANT CHANGE UP (ref 7–23)
CALCIUM SERPL-MCNC: 9.4 MG/DL — SIGNIFICANT CHANGE UP (ref 8.4–10.5)
CHLORIDE SERPL-SCNC: 101 MMOL/L — SIGNIFICANT CHANGE UP (ref 96–108)
CO2 SERPL-SCNC: 24 MMOL/L — SIGNIFICANT CHANGE UP (ref 22–31)
CREAT SERPL-MCNC: 0.76 MG/DL — SIGNIFICANT CHANGE UP (ref 0.5–1.3)
EGFR: 93 ML/MIN/1.73M2 — SIGNIFICANT CHANGE UP
FLUAV AG NPH QL: SIGNIFICANT CHANGE UP
FLUBV AG NPH QL: SIGNIFICANT CHANGE UP
GLUCOSE BLDC GLUCOMTR-MCNC: 117 MG/DL — HIGH (ref 70–99)
GLUCOSE BLDC GLUCOMTR-MCNC: 143 MG/DL — HIGH (ref 70–99)
GLUCOSE BLDC GLUCOMTR-MCNC: 145 MG/DL — HIGH (ref 70–99)
GLUCOSE SERPL-MCNC: 136 MG/DL — HIGH (ref 70–99)
HCT VFR BLD CALC: 38.8 % — LOW (ref 39–50)
HGB BLD-MCNC: 12.9 G/DL — LOW (ref 13–17)
MCHC RBC-ENTMCNC: 29.9 PG — SIGNIFICANT CHANGE UP (ref 27–34)
MCHC RBC-ENTMCNC: 33.2 G/DL — SIGNIFICANT CHANGE UP (ref 32–36)
MCV RBC AUTO: 90 FL — SIGNIFICANT CHANGE UP (ref 80–100)
NRBC # BLD: 0 /100 WBCS — SIGNIFICANT CHANGE UP (ref 0–0)
NRBC BLD-RTO: 0 /100 WBCS — SIGNIFICANT CHANGE UP (ref 0–0)
PLATELET # BLD AUTO: 274 K/UL — SIGNIFICANT CHANGE UP (ref 150–400)
POTASSIUM SERPL-MCNC: 3.9 MMOL/L — SIGNIFICANT CHANGE UP (ref 3.5–5.3)
POTASSIUM SERPL-SCNC: 3.9 MMOL/L — SIGNIFICANT CHANGE UP (ref 3.5–5.3)
PROT SERPL-MCNC: 7.2 G/DL — SIGNIFICANT CHANGE UP (ref 6–8.3)
RBC # BLD: 4.31 M/UL — SIGNIFICANT CHANGE UP (ref 4.2–5.8)
RBC # FLD: 12.8 % — SIGNIFICANT CHANGE UP (ref 10.3–14.5)
RSV RNA NPH QL NAA+NON-PROBE: SIGNIFICANT CHANGE UP
SARS-COV-2 RNA SPEC QL NAA+PROBE: SIGNIFICANT CHANGE UP
SODIUM SERPL-SCNC: 138 MMOL/L — SIGNIFICANT CHANGE UP (ref 135–145)
WBC # BLD: 11.21 K/UL — HIGH (ref 3.8–10.5)
WBC # FLD AUTO: 11.21 K/UL — HIGH (ref 3.8–10.5)

## 2025-01-29 PROCEDURE — 99232 SBSQ HOSP IP/OBS MODERATE 35: CPT | Mod: GC

## 2025-01-29 RX ORDER — ACETAMINOPHEN 160 MG/5ML
1000 SUSPENSION ORAL ONCE
Refills: 0 | Status: COMPLETED | OUTPATIENT
Start: 2025-01-29 | End: 2025-01-29

## 2025-01-29 RX ORDER — PANTOPRAZOLE 20 MG/1
40 TABLET, DELAYED RELEASE ORAL DAILY
Refills: 0 | Status: DISCONTINUED | OUTPATIENT
Start: 2025-01-29 | End: 2025-02-01

## 2025-01-29 RX ORDER — POLYETHYLENE GLYCOL 3350 17 G/17G
17 POWDER, FOR SOLUTION ORAL DAILY
Refills: 0 | Status: DISCONTINUED | OUTPATIENT
Start: 2025-01-29 | End: 2025-02-01

## 2025-01-29 RX ORDER — QUETIAPINE FUMARATE 300 MG/1
12.5 TABLET ORAL
Refills: 0 | Status: DISCONTINUED | OUTPATIENT
Start: 2025-01-29 | End: 2025-02-01

## 2025-01-29 RX ADMIN — Medication 250 MILLIGRAM(S): at 08:43

## 2025-01-29 RX ADMIN — Medication 6.25 MILLIGRAM(S): at 17:48

## 2025-01-29 RX ADMIN — QUETIAPINE FUMARATE 12.5 MILLIGRAM(S): 300 TABLET ORAL at 12:43

## 2025-01-29 RX ADMIN — ACETAMINOPHEN 400 MILLIGRAM(S): 160 SUSPENSION ORAL at 23:29

## 2025-01-29 RX ADMIN — Medication 6.25 MILLIGRAM(S): at 06:23

## 2025-01-29 RX ADMIN — Medication 5 MILLIGRAM(S): at 06:23

## 2025-01-29 RX ADMIN — Medication 10 MILLIGRAM(S): at 12:43

## 2025-01-29 RX ADMIN — Medication 250 MILLIGRAM(S): at 17:49

## 2025-01-29 RX ADMIN — ACETAMINOPHEN 650 MILLIGRAM(S): 160 SUSPENSION ORAL at 08:50

## 2025-01-29 RX ADMIN — POLYETHYLENE GLYCOL 3350 17 GRAM(S): 17 POWDER, FOR SOLUTION ORAL at 12:42

## 2025-01-29 RX ADMIN — PANTOPRAZOLE 40 MILLIGRAM(S): 20 TABLET, DELAYED RELEASE ORAL at 12:46

## 2025-01-29 RX ADMIN — QUETIAPINE FUMARATE 50 MILLIGRAM(S): 300 TABLET ORAL at 22:48

## 2025-01-29 RX ADMIN — ATORVASTATIN CALCIUM 20 MILLIGRAM(S): 80 TABLET, FILM COATED ORAL at 22:39

## 2025-01-29 RX ADMIN — ACETAMINOPHEN, DIPHENHYDRAMINE HCL, PHENYLEPHRINE HCL 3 MILLIGRAM(S): 325; 25; 5 TABLET ORAL at 22:39

## 2025-01-29 NOTE — PROGRESS NOTE ADULT - TIME BILLING
Review of tests, imaging, labs, documents, medical management, coordination of care and counseling.

## 2025-01-29 NOTE — PROGRESS NOTE ADULT - ASSESSMENT
79M w/Hx of HTN, HLD, DM2, vertigo, thoracic aneurysm presents due to worsening AMS. Patient has had worsening AMS for over 1 year that had acutely worsened over the last 3 days. 79M w/Hx of HTN, HLD, DM2, vertigo, thoracic aneurysm presents due to worsening AMS. Patient has had worsening AMS for over 1 year that had acutely worsened over the last 3 days; neurologic workup only remarkable for chronic microvascular changes seen on MRI. Course c/b by agitation for which  is following; agitation now improving with patient requiring no PRN medication.

## 2025-01-29 NOTE — PROGRESS NOTE ADULT - PROBLEM SELECTOR PLAN 2
Based on family's description, high suspicion for lewy body dementia in patient.    - MRI with chronic microvascular ischemic changes, possibly indicative of vascular dementia. Given changes can look similar to other dementia etiologies, will need FDG PET to r/o LDB  - Psych following for associated agitation, recs appreciated  - Please obtain neurology consult for further eval and to establish care w/outpatient follow up  - R/o underlying delirium causes  - Can initiate donepezil 10mg if delirium generally ruled out prior to discharge Based on family's description, high suspicion for lewy body dementia in patient.    - MRI with chronic microvascular ischemic changes, possibly indicative of vascular dementia. Given changes can look similar to other dementia etiologies  - Per neuro will need FDG PET to r/o LDB outpatient  - Psych following for associated agitation, recs appreciated  - R/o underlying delirium causes - neuro workup unremarkable

## 2025-01-29 NOTE — PROGRESS NOTE ADULT - SUBJECTIVE AND OBJECTIVE BOX
Patient is a 79y old  Male who presents with a chief complaint of Agitation, AMS    SUBJECTIVE / OVERNIGHT EVENTS: Patient seen and examined at bedside. No overnight events.    MEDICATIONS  (STANDING):  amLODIPine   Tablet 5 milliGRAM(s) Oral daily  atorvastatin 20 milliGRAM(s) Oral at bedtime  carvedilol 6.25 milliGRAM(s) Oral every 12 hours  dextrose 5%. 1000 milliLiter(s) (50 mL/Hr) IV Continuous <Continuous>  dextrose 5%. 1000 milliLiter(s) (100 mL/Hr) IV Continuous <Continuous>  dextrose 50% Injectable 25 Gram(s) IV Push once  dextrose 50% Injectable 12.5 Gram(s) IV Push once  dextrose 50% Injectable 25 Gram(s) IV Push once  divalproex  milliGRAM(s) Oral two times a day  ezetimibe 10 milliGRAM(s) Oral daily  glucagon  Injectable 1 milliGRAM(s) IntraMuscular once  insulin lispro (ADMELOG) corrective regimen sliding scale   SubCutaneous three times a day before meals  lactated ringers. 1000 milliLiter(s) (100 mL/Hr) IV Continuous <Continuous>  QUEtiapine 50 milliGRAM(s) Oral at bedtime  QUEtiapine 12.5 milliGRAM(s) Oral daily    MEDICATIONS  (PRN):  acetaminophen     Tablet .. 650 milliGRAM(s) Oral every 6 hours PRN Temp greater or equal to 38C (100.4F), Mild Pain (1 - 3)  dextrose Oral Gel 15 Gram(s) Oral once PRN Blood Glucose LESS THAN 70 milliGRAM(s)/deciliter  melatonin 3 milliGRAM(s) Oral at bedtime PRN Insomnia  QUEtiapine 12.5 milliGRAM(s) Oral every 6 hours PRN Agitation    Vital Signs Last 24 Hrs  T(C): 37.4 (2025 06:25), Max: 37.7 (2025 20:01)  T(F): 99.4 (2025 06:25), Max: 99.9 (2025 20:01)  HR: 99 (2025 06:25) (86 - 99)  BP: 162/74 (2025 06:25) (145/67 - 162/74)  BP(mean): --  RR: 18 (2025 06:25) (18 - 18)  SpO2: 98% (2025 06:25) (95% - 98%)    Parameters below as of 2025 06:25  Patient On (Oxygen Delivery Method): room air    CAPILLARY BLOOD GLUCOSE  POCT Blood Glucose.: 155 mg/dL (2025 21:45)  POCT Blood Glucose.: 121 mg/dL (2025 17:06)  POCT Blood Glucose.: 102 mg/dL (2025 11:44)  POCT Blood Glucose.: 100 mg/dL (2025 07:50)    I&O's Summary    PHYSICAL EXAM:  CONSTITUTIONAL: Lying in bed. No apparent distress.  EYES: PERRLA and symmetric  ENMT: Oral mucosa with moist membranes.  NECK: Supple, symmetric. No JVD.  RESP: No respiratory distress, no use of accessory muscles; CTA b/l, no WRR  CV: +S1S2, no MRG  GI: Soft, NT, ND, no rebound, no guarding  : No suprapubic tenderness. No CVA tenderness.  LYMPH: No cervical LAD or tenderness  MSK: No spinal tenderness, normal muscle strength/tone  EXTREMITIES: No pedal edema  SKIN: No rashes or ulcers noted  NEURO: Lethargic not alert. Arousable to tactile stimuli.  PSYCH: Agitated, uncooperative    LABS:    Urinalysis Basic - ( 2025 12:09 )    Color: Yellow / Appearance: Clear / S.020 / pH: x  Gluc: x / Ketone: 15 mg/dL  / Bili: Negative / Urobili: 1.0 mg/dL   Blood: x / Protein: Trace mg/dL / Nitrite: Negative   Leuk Esterase: Negative / RBC: 1 /HPF / WBC 1 /HPF   Sq Epi: x / Non Sq Epi: 0 /HPF / Bacteria: Negative /HPF    Armando Lee MD, Internal Medicine Resident Patient is a 79y old  Male who presents with a chief complaint of Agitation, AMS    SUBJECTIVE / OVERNIGHT EVENTS: Patient seen and examined at bedside. No overnight events. Patient was intermittently agitated overnight but redirectable and not requiring PRNs. This AM states he is in mild abdominal pain that began overnight and primarily located in RLQ. Otherwise denies CP, SOB, subjective fever, chills, n/v/d.    MEDICATIONS  (STANDING):  amLODIPine   Tablet 5 milliGRAM(s) Oral daily  atorvastatin 20 milliGRAM(s) Oral at bedtime  carvedilol 6.25 milliGRAM(s) Oral every 12 hours  dextrose 5%. 1000 milliLiter(s) (50 mL/Hr) IV Continuous <Continuous>  dextrose 5%. 1000 milliLiter(s) (100 mL/Hr) IV Continuous <Continuous>  dextrose 50% Injectable 25 Gram(s) IV Push once  dextrose 50% Injectable 12.5 Gram(s) IV Push once  dextrose 50% Injectable 25 Gram(s) IV Push once  divalproex  milliGRAM(s) Oral two times a day  ezetimibe 10 milliGRAM(s) Oral daily  glucagon  Injectable 1 milliGRAM(s) IntraMuscular once  insulin lispro (ADMELOG) corrective regimen sliding scale   SubCutaneous three times a day before meals  lactated ringers. 1000 milliLiter(s) (100 mL/Hr) IV Continuous <Continuous>  QUEtiapine 50 milliGRAM(s) Oral at bedtime  QUEtiapine 12.5 milliGRAM(s) Oral daily    MEDICATIONS  (PRN):  acetaminophen     Tablet .. 650 milliGRAM(s) Oral every 6 hours PRN Temp greater or equal to 38C (100.4F), Mild Pain (1 - 3)  dextrose Oral Gel 15 Gram(s) Oral once PRN Blood Glucose LESS THAN 70 milliGRAM(s)/deciliter  melatonin 3 milliGRAM(s) Oral at bedtime PRN Insomnia  QUEtiapine 12.5 milliGRAM(s) Oral every 6 hours PRN Agitation    Vital Signs Last 24 Hrs  T(C): 37.4 (2025 06:25), Max: 37.7 (2025 20:01)  T(F): 99.4 (2025 06:25), Max: 99.9 (2025 20:01)  HR: 99 (2025 06:25) (86 - 99)  BP: 162/74 (2025 06:25) (145/67 - 162/74)  BP(mean): --  RR: 18 (2025 06:25) (18 - 18)  SpO2: 98% (2025 06:25) (95% - 98%)    Parameters below as of 2025 06:25  Patient On (Oxygen Delivery Method): room air    CAPILLARY BLOOD GLUCOSE  POCT Blood Glucose.: 155 mg/dL (2025 21:45)  POCT Blood Glucose.: 121 mg/dL (2025 17:06)  POCT Blood Glucose.: 102 mg/dL (2025 11:44)  POCT Blood Glucose.: 100 mg/dL (2025 07:50)    I&O's Summary    PHYSICAL EXAM:  CONSTITUTIONAL: Lying in bed. No apparent distress.  EYES: PERRLA and symmetric  ENMT: Oral mucosa with moist membranes.  NECK: Supple, symmetric. No JVD.  RESP: No respiratory distress, no use of accessory muscles; CTA b/l, no WRR  CV: +S1S2, no MRG  GI: Soft, NT, ND, no rebound, no guarding  : No suprapubic tenderness. No CVA tenderness.  LYMPH: No cervical LAD or tenderness  MSK: No spinal tenderness, normal muscle strength/tone  EXTREMITIES: No pedal edema  SKIN: No rashes or ulcers noted  NEURO: Oriented to person this AM. Arousable to verbal tactile stimuli, following commands.  PSYCH: Agitated, uncooperative    LABS:    Urinalysis Basic - ( 2025 12:09 )    Color: Yellow / Appearance: Clear / S.020 / pH: x  Gluc: x / Ketone: 15 mg/dL  / Bili: Negative / Urobili: 1.0 mg/dL   Blood: x / Protein: Trace mg/dL / Nitrite: Negative   Leuk Esterase: Negative / RBC: 1 /HPF / WBC 1 /HPF   Sq Epi: x / Non Sq Epi: 0 /HPF / Bacteria: Negative /HPF    Armando Lee MD, Internal Medicine Resident

## 2025-01-29 NOTE — PROGRESS NOTE ADULT - PROBLEM SELECTOR PLAN 6
- Re-introduce atorvastatin and zetia  - F/u lipid panel - wnl - C/w atorvastatin and zetia  - F/u lipid panel - wnl

## 2025-01-29 NOTE — PROGRESS NOTE ADULT - PROBLEM SELECTOR PLAN 4
- Hold metformin, re-introduce metformin on discharge  - Mod ISS  - F/u POCT, A1c - Hold metformin, re-introduce metformin on discharge  - Mod ISS  - F/u POCT, A1c - 6.7%

## 2025-01-29 NOTE — PROGRESS NOTE ADULT - PROBLEM SELECTOR PLAN 1
Agitation likely 2/2 to underlying vascular dementia in the setting of med noncompliance vs LBD (below) and/or delirium. Per family, chronic for 1 year and acutely worsened last 6 months with agitation, violence, personality changes, and visual hallucinations.   - F/u TSH - wnl  - F/u RVP - wnl  - Psych following, recs appreciated  - Will re-orient at bedside as needed  - Social work and case management consult for placement/aid at home for patient and patient's family safety  - Seroquel 12.5mg PO PRN, of Zyprexa 2.5mg q6h IV PRN if cannot tolerate PO  - Seroquel 50mg qD standing  - Depakote 250mg BID Agitation likely 2/2 to underlying vascular dementia in the setting of med noncompliance vs LBD (below) and/or delirium. Per family, chronic for 1 year and acutely worsened last 6 months with agitation, violence, personality changes, and visual hallucinations.   - F/u TSH - wnl  - F/u RVP - wnl  - BH following, recs appreciated  - Will re-orient at bedside as needed  - Social work and case management consult for placement/aid at home for patient and patient's family safety  - Seroquel 12.5mg PO PRN, or Zyprexa 2.5mg q6h IV PRN if cannot tolerate PO  - Seroquel 50mg qD standing  - Depakote 250mg BID

## 2025-01-30 LAB
GLUCOSE BLDC GLUCOMTR-MCNC: 111 MG/DL — HIGH (ref 70–99)
GLUCOSE BLDC GLUCOMTR-MCNC: 121 MG/DL — HIGH (ref 70–99)
GLUCOSE BLDC GLUCOMTR-MCNC: 126 MG/DL — HIGH (ref 70–99)
GLUCOSE BLDC GLUCOMTR-MCNC: 140 MG/DL — HIGH (ref 70–99)

## 2025-01-30 PROCEDURE — 99232 SBSQ HOSP IP/OBS MODERATE 35: CPT

## 2025-01-30 PROCEDURE — 99232 SBSQ HOSP IP/OBS MODERATE 35: CPT | Mod: GC

## 2025-01-30 RX ADMIN — Medication 250 MILLIGRAM(S): at 17:11

## 2025-01-30 RX ADMIN — Medication 6.25 MILLIGRAM(S): at 17:11

## 2025-01-30 RX ADMIN — ATORVASTATIN CALCIUM 20 MILLIGRAM(S): 80 TABLET, FILM COATED ORAL at 21:52

## 2025-01-30 RX ADMIN — Medication 250 MILLIGRAM(S): at 05:29

## 2025-01-30 RX ADMIN — ACETAMINOPHEN 650 MILLIGRAM(S): 160 SUSPENSION ORAL at 11:48

## 2025-01-30 RX ADMIN — PANTOPRAZOLE 40 MILLIGRAM(S): 20 TABLET, DELAYED RELEASE ORAL at 11:49

## 2025-01-30 RX ADMIN — Medication 5 MILLIGRAM(S): at 05:29

## 2025-01-30 RX ADMIN — POLYETHYLENE GLYCOL 3350 17 GRAM(S): 17 POWDER, FOR SOLUTION ORAL at 11:51

## 2025-01-30 RX ADMIN — QUETIAPINE FUMARATE 50 MILLIGRAM(S): 300 TABLET ORAL at 21:52

## 2025-01-30 RX ADMIN — Medication 10 MILLIGRAM(S): at 11:48

## 2025-01-30 RX ADMIN — Medication 6.25 MILLIGRAM(S): at 05:30

## 2025-01-30 RX ADMIN — QUETIAPINE FUMARATE 12.5 MILLIGRAM(S): 300 TABLET ORAL at 17:10

## 2025-01-30 NOTE — PROGRESS NOTE ADULT - PROBLEM SELECTOR PLAN 2
Based on family's description, high suspicion for lewy body dementia in patient.    - MRI with chronic microvascular ischemic changes, possibly indicative of vascular dementia. Given changes can look similar to other dementia etiologies  - Per neuro will need FDG PET to r/o LDB outpatient  - Psych following for associated agitation, recs appreciated  - R/o underlying delirium causes - neuro workup unremarkable

## 2025-01-30 NOTE — PROGRESS NOTE ADULT - SUBJECTIVE AND OBJECTIVE BOX
Patient is a 79y old  Male who presents with a chief complaint of Agitation, AMS    SUBJECTIVE / OVERNIGHT EVENTS: Patient seen and examined at bedside. Overnight with elevated temp to 100.9F; full set of labs drawn and blood cultures sent.    MEDICATIONS  (STANDING):  amLODIPine   Tablet 5 milliGRAM(s) Oral daily  atorvastatin 20 milliGRAM(s) Oral at bedtime  carvedilol 6.25 milliGRAM(s) Oral every 12 hours  dextrose 5%. 1000 milliLiter(s) (50 mL/Hr) IV Continuous <Continuous>  dextrose 5%. 1000 milliLiter(s) (100 mL/Hr) IV Continuous <Continuous>  dextrose 50% Injectable 25 Gram(s) IV Push once  dextrose 50% Injectable 12.5 Gram(s) IV Push once  dextrose 50% Injectable 25 Gram(s) IV Push once  divalproex  milliGRAM(s) Oral two times a day  ezetimibe 10 milliGRAM(s) Oral daily  glucagon  Injectable 1 milliGRAM(s) IntraMuscular once  insulin lispro (ADMELOG) corrective regimen sliding scale   SubCutaneous three times a day before meals  pantoprazole    Tablet 40 milliGRAM(s) Oral daily  polyethylene glycol 3350 17 Gram(s) Oral daily  QUEtiapine 50 milliGRAM(s) Oral at bedtime  QUEtiapine 12.5 milliGRAM(s) Oral <User Schedule>    MEDICATIONS  (PRN):  acetaminophen     Tablet .. 650 milliGRAM(s) Oral every 6 hours PRN Temp greater or equal to 38C (100.4F), Mild Pain (1 - 3)  dextrose Oral Gel 15 Gram(s) Oral once PRN Blood Glucose LESS THAN 70 milliGRAM(s)/deciliter  melatonin 3 milliGRAM(s) Oral at bedtime PRN Insomnia  QUEtiapine 12.5 milliGRAM(s) Oral every 6 hours PRN Agitation    Vital Signs Last 24 Hrs  T(C): 36.9 (30 Jan 2025 05:20), Max: 38.3 (29 Jan 2025 20:31)  T(F): 98.4 (30 Jan 2025 05:20), Max: 100.9 (29 Jan 2025 20:31)  HR: 93 (30 Jan 2025 05:20) (89 - 97)  BP: 181/87 (30 Jan 2025 05:20) (92/64 - 181/87)  BP(mean): --  RR: 18 (30 Jan 2025 05:20) (18 - 18)  SpO2: 94% (30 Jan 2025 05:20) (94% - 99%)    Parameters below as of 30 Jan 2025 05:20  Patient On (Oxygen Delivery Method): room air    CAPILLARY BLOOD GLUCOSE  POCT Blood Glucose.: 117 mg/dL (29 Jan 2025 20:26)  POCT Blood Glucose.: 143 mg/dL (29 Jan 2025 12:13)  POCT Blood Glucose.: 145 mg/dL (29 Jan 2025 08:03)    I&O's Summary    PHYSICAL EXAM:  CONSTITUTIONAL: Lying in bed. No apparent distress.  EYES: PERRLA and symmetric  ENMT: Oral mucosa with moist membranes.  NECK: Supple, symmetric. No JVD.  RESP: No respiratory distress, no use of accessory muscles; CTA b/l, no WRR  CV: +S1S2, no MRG  GI: Soft, NT, ND, no rebound, no guarding  : No suprapubic tenderness. No CVA tenderness.  LYMPH: No cervical LAD or tenderness  MSK: No spinal tenderness, normal muscle strength/tone  EXTREMITIES: No pedal edema  SKIN: No rashes or ulcers noted  NEURO: Oriented to person this AM. Arousable to verbal tactile stimuli, following commands.  PSYCH: Agitated, uncooperative    LABS:                        12.9   11.21 )-----------( 274      ( 29 Jan 2025 22:41 )             38.8     01-29    138  |  101  |  11  ----------------------------<  136[H]  3.9   |  24  |  0.76    Ca    9.4      29 Jan 2025 22:41    TPro  7.2  /  Alb  3.4  /  TBili  0.5  /  DBili  x   /  AST  22  /  ALT  20  /  AlkPhos  72  01-29    Urinalysis Basic - ( 29 Jan 2025 22:41 )    Color: x / Appearance: x / SG: x / pH: x  Gluc: 136 mg/dL / Ketone: x  / Bili: x / Urobili: x   Blood: x / Protein: x / Nitrite: x   Leuk Esterase: x / RBC: x / WBC x   Sq Epi: x / Non Sq Epi: x / Bacteria: x    Armando Labagnara, MD, Internal Medicine Resident Patient is a 79y old  Male who presents with a chief complaint of Agitation, AMS    SUBJECTIVE / OVERNIGHT EVENTS: Patient seen and examined at bedside. Overnight with elevated temp to 100.9F; full set of labs drawn and blood cultures sent.     Patient this morning laying in bed, sleepy although arousable. Per overnight nursing staff, patient was minimally agitated overnight and was easier to redirect than previous evenings. Patient is conversive this morning and states that his abdominal pain from the day prior has resolved; notes he had 2 BMs. States he was able to get a good amount of sleep overnight, and that he has no current medical complaints at this time. This morning denies CP, SOB, subjective fever, chills, n/v/d.    MEDICATIONS  (STANDING):  amLODIPine   Tablet 5 milliGRAM(s) Oral daily  atorvastatin 20 milliGRAM(s) Oral at bedtime  carvedilol 6.25 milliGRAM(s) Oral every 12 hours  dextrose 5%. 1000 milliLiter(s) (50 mL/Hr) IV Continuous <Continuous>  dextrose 5%. 1000 milliLiter(s) (100 mL/Hr) IV Continuous <Continuous>  dextrose 50% Injectable 25 Gram(s) IV Push once  dextrose 50% Injectable 12.5 Gram(s) IV Push once  dextrose 50% Injectable 25 Gram(s) IV Push once  divalproex  milliGRAM(s) Oral two times a day  ezetimibe 10 milliGRAM(s) Oral daily  glucagon  Injectable 1 milliGRAM(s) IntraMuscular once  insulin lispro (ADMELOG) corrective regimen sliding scale   SubCutaneous three times a day before meals  pantoprazole    Tablet 40 milliGRAM(s) Oral daily  polyethylene glycol 3350 17 Gram(s) Oral daily  QUEtiapine 50 milliGRAM(s) Oral at bedtime  QUEtiapine 12.5 milliGRAM(s) Oral <User Schedule>    MEDICATIONS  (PRN):  acetaminophen     Tablet .. 650 milliGRAM(s) Oral every 6 hours PRN Temp greater or equal to 38C (100.4F), Mild Pain (1 - 3)  dextrose Oral Gel 15 Gram(s) Oral once PRN Blood Glucose LESS THAN 70 milliGRAM(s)/deciliter  melatonin 3 milliGRAM(s) Oral at bedtime PRN Insomnia  QUEtiapine 12.5 milliGRAM(s) Oral every 6 hours PRN Agitation    Vital Signs Last 24 Hrs  T(C): 36.9 (30 Jan 2025 05:20), Max: 38.3 (29 Jan 2025 20:31)  T(F): 98.4 (30 Jan 2025 05:20), Max: 100.9 (29 Jan 2025 20:31)  HR: 93 (30 Jan 2025 05:20) (89 - 97)  BP: 181/87 (30 Jan 2025 05:20) (92/64 - 181/87)  BP(mean): --  RR: 18 (30 Jan 2025 05:20) (18 - 18)  SpO2: 94% (30 Jan 2025 05:20) (94% - 99%)    Parameters below as of 30 Jan 2025 05:20  Patient On (Oxygen Delivery Method): room air    CAPILLARY BLOOD GLUCOSE  POCT Blood Glucose.: 117 mg/dL (29 Jan 2025 20:26)  POCT Blood Glucose.: 143 mg/dL (29 Jan 2025 12:13)  POCT Blood Glucose.: 145 mg/dL (29 Jan 2025 08:03)    I&O's Summary    PHYSICAL EXAM:  CONSTITUTIONAL: Lying in bed. No apparent distress.  EYES: PERRLA and symmetric  ENMT: Oral mucosa with moist membranes.  NECK: Supple, symmetric. No JVD.  RESP: No respiratory distress, no use of accessory muscles; CTA b/l, no WRR  CV: +S1S2, no MRG  GI: Soft, NT, ND, no rebound, no guarding  : No suprapubic tenderness. No CVA tenderness.  LYMPH: No cervical LAD or tenderness  MSK: No spinal tenderness, normal muscle strength/tone  EXTREMITIES: No pedal edema  SKIN: No rashes or ulcers noted  NEURO: Oriented to person this AM. Arousable to verbal tactile stimuli, following commands.  PSYCH: Laying in bed, calm, cooperative, with poor insight into his condition.    LABS:                        12.9   11.21 )-----------( 274      ( 29 Jan 2025 22:41 )             38.8     01-29    138  |  101  |  11  ----------------------------<  136[H]  3.9   |  24  |  0.76    Ca    9.4      29 Jan 2025 22:41    TPro  7.2  /  Alb  3.4  /  TBili  0.5  /  DBili  x   /  AST  22  /  ALT  20  /  AlkPhos  72  01-29    Urinalysis Basic - ( 29 Jan 2025 22:41 )    Color: x / Appearance: x / SG: x / pH: x  Gluc: 136 mg/dL / Ketone: x  / Bili: x / Urobili: x   Blood: x / Protein: x / Nitrite: x   Leuk Esterase: x / RBC: x / WBC x   Sq Epi: x / Non Sq Epi: x / Bacteria: x    Armando Lee MD, Internal Medicine Resident

## 2025-01-30 NOTE — PROGRESS NOTE ADULT - PROBLEM SELECTOR PLAN 7
Diet: Regular  Dvt ppx: N/a  Ethics: Full code  Dispo: TBD, unsafe to return home Diet: Regular  Dvt ppx: N/a  Ethics: Full code  Dispo: Social work and case management consult for placement/aid at home for patient and patient's family safety

## 2025-01-30 NOTE — BH CONSULTATION LIAISON PROGRESS NOTE - NSBHCONSULTMEDAGITATION_PSY_A_CORE FT
Seroquel 12.5 mg PO q6hrs PRN for agitation. If patient not taking PO or refractory to Seroquel, recommend Zyprexa 2.5 mg IM q6hrs PRN for agitation.
Seroquel 12.5 mg PO q6hrs PRN for agitation. If patient not taking PO or refractory to Seroquel, recommend Zyprexa 2.5 mg IM q6hrs PRN for agitation.
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
## 2025-01-30 NOTE — PROVIDER CONTACT NOTE (OTHER) - ASSESSMENT
Patient A&Ox0. VS as documented. On room air. No s/s of pain or discomfort noted. Patient agitated and aggressive towards staff. Patient stating "Don't touch me".
Pt A+Ox1. Other VSS.

## 2025-01-30 NOTE — BH CONSULTATION LIAISON PROGRESS NOTE - NSBHFUPINTERVALHXFT_PSY_A_CORE
Patient seen today for follow-up of agitation. Per primary team patient was more agitated yesterday. Per patient's nurse, today he has not been agitated and has been relatively calm and cooperative. On exam patient is asleep, but wakes to verbal stimulation and is cooperative with interview. He is oriented only to himself. States he has generally been doing ok, but does not sleep well at night "because of all the people roaming around". States he doesn't have much appetite because he is "tired of eating". Becomes confused at what my name tag is and asks me "is it something tropical". Denies having any suicidal or homicidal thoughts. Does not provide a related answer when asked about hallucinations. States he generally feels safe here.

## 2025-01-30 NOTE — PROVIDER CONTACT NOTE (OTHER) - SITUATION
Patient's BP is 189/83 with HR 99. Patient is agitated and aggressive and restless towards staff during vital signs. No s/s of pain or discomfort noted.
Axillary temp 100.9

## 2025-01-30 NOTE — BH CONSULTATION LIAISON PROGRESS NOTE - NSBHCHARTREVIEWVS_PSY_A_CORE FT
Vital Signs Last 24 Hrs  T(C): 37.3 (28 Jan 2025 06:25), Max: 37.3 (27 Jan 2025 21:40)  T(F): 99.1 (28 Jan 2025 06:25), Max: 99.1 (27 Jan 2025 21:40)  HR: 101 (28 Jan 2025 06:25) (99 - 103)  BP: 175/91 (28 Jan 2025 06:25) (150/85 - 189/83)  BP(mean): --  RR: 18 (28 Jan 2025 06:25) (18 - 20)  SpO2: 98% (28 Jan 2025 06:25) (98% - 100%)    Parameters below as of 27 Jan 2025 21:40  Patient On (Oxygen Delivery Method): room air    
Vital Signs Last 24 Hrs  T(C): 36.5 (31 Jan 2025 04:15), Max: 37.3 (30 Jan 2025 20:00)  T(F): 97.7 (31 Jan 2025 04:15), Max: 99.1 (30 Jan 2025 20:00)  HR: 100 (31 Jan 2025 04:15) (86 - 100)  BP: 146/91 (31 Jan 2025 04:15) (146/91 - 168/80)  BP(mean): --  RR: 18 (31 Jan 2025 04:15) (18 - 18)  SpO2: 100% (31 Jan 2025 04:15) (93% - 100%)    Parameters below as of 31 Jan 2025 04:15  Patient On (Oxygen Delivery Method): room air

## 2025-01-30 NOTE — BH CONSULTATION LIAISON PROGRESS NOTE - NSBHCHARTREVIEWLAB_PSY_A_CORE FT
Comprehensive Metabolic Panel (01.29.25 @ 22:41)   Sodium: 138 mmol/L  Potassium: 3.9 mmol/L  Chloride: 101 mmol/L  Carbon Dioxide: 24 mmol/L  Anion Gap: 13 mmol/L  Blood Urea Nitrogen: 11 mg/dL  Creatinine: 0.76 mg/dL  Glucose: 136 mg/dL  Calcium: 9.4 mg/dL  Protein Total: 7.2 g/dL  Albumin: 3.4 g/dL  Bilirubin Total: 0.5 mg/dL  Alkaline Phosphatase: 72 U/L  Aspartate Aminotransferase (AST/SGOT): 22 U/L  Alanine Aminotransferase (ALT/SGPT): 20 U/L  eGFR: 93Complete Blood Count STAT (01.29.25 @ 22:41)   Nucleated RBC: 0 /100 WBCs  WBC Count: 11.21 K/uL  RBC Count: 4.31 M/uL  Hemoglobin: 12.9 g/dL  Hematocrit: 38.8 %  Mean Cell Volume: 90.0 fl  Mean Cell Hemoglobin: 29.9 pg  Mean Cell Hemoglobin Conc: 33.2 g/dL  Red Cell Distrib Width: 12.8 %  Platelet Count - Automated: 274 K/uLFluA/FluB/RSV/COVID PCR (01.29.25 @ 20:38)   SARS-CoV-2 Result: NotDete: This molecular assay uses polymerase chain reaction (PCR) to detect   influenza A virus, influenza B virus, respiratory syncytial virus (RSV),   and SARS-CoV-2 (COVID-19). Test results should be correlated with   clinical presentation, patient history, and epidemiology.  Influenza A Result: NotDete  Influenza B Result: NotDete  Resp Syn Virus Result: NotDetecFluA/FluB/RSV/COVID PCR (01.29.25 @ 20:38)   SARS-CoV-2 Result: NotDete: This molecular assay uses polymerase chain reaction (PCR) to detect   influenza A virus, influenza B virus, respiratory syncytial virus (RSV),   and SARS-CoV-2 (COVID-19). Test results should be correlated with   clinical presentation, patient history, and epidemiology.  Influenza A Result: NotDete  Influenza B Result: NotDetec  Resp Syn Virus Result: NotDete
  LABS:                          13.6   9.39  )-----------( 216      ( 2025 07:03 )             40.4         138  |  101  |  10  ----------------------------<  139[H]  3.8   |  22  |  0.86    Ca    9.3      2025 07:02  Phos  2.0       Mg     1.8         TPro  6.9  /  Alb  4.4  /  TBili  0.7  /  DBili  x   /  AST  40  /  ALT  22  /  AlkPhos  75      LIVER FUNCTIONS - ( 2025 07:02 )  Alb: 4.4 g/dL / Pro: 6.9 g/dL / ALK PHOS: 75 U/L / ALT: 22 U/L / AST: 40 U/L / GGT: x             Urinalysis Basic - ( 2025 12:09 )    Color: Yellow / Appearance: Clear / S.020 / pH: x  Gluc: x / Ketone: 15 mg/dL  / Bili: Negative / Urobili: 1.0 mg/dL   Blood: x / Protein: Trace mg/dL / Nitrite: Negative   Leuk Esterase: Negative / RBC: 1 /HPF / WBC 1 /HPF   Sq Epi: x / Non Sq Epi: 0 /HPF / Bacteria: Negative /HPF

## 2025-01-30 NOTE — PROGRESS NOTE ADULT - PROBLEM SELECTOR PLAN 1
Agitation likely 2/2 to underlying vascular dementia in the setting of med noncompliance vs LBD (below) and/or delirium. Per family, chronic for 1 year and acutely worsened last 6 months with agitation, violence, personality changes, and visual hallucinations.   - F/u TSH - wnl  - F/u RVP - wnl  - BH following, recs appreciated  - Will re-orient at bedside as needed  - Social work and case management consult for placement/aid at home for patient and patient's family safety  - Seroquel 12.5mg PO PRN, or Zyprexa 2.5mg q6h IV PRN if cannot tolerate PO  - Seroquel 50mg qD standing  - Depakote 250mg BID Agitation likely 2/2 to underlying vascular dementia in the setting of med noncompliance vs LBD (below) and/or delirium. Per family, chronic for 1 year and acutely worsened last 6 months with agitation, violence, personality changes, and visual hallucinations.   - F/u TSH - wnl  - F/u RVP - wnl  - BH following, recs appreciated  - Re-orientation is effective  - Seroquel 12.5mg PO PRN  - Seroquel 12.5mg PO @ 3PM (30-60 minutes prior to expected sundown)  - Seroquel 50mg qD standing  - Depakote 250mg BID Arnold Bedoya(Resident)

## 2025-01-30 NOTE — PROVIDER CONTACT NOTE (OTHER) - REASON
Axillary temp 100.9
Patient's BP is 189/83 with HR 99. Patient is agitated and aggressive during vital signs.

## 2025-01-30 NOTE — BH CONSULTATION LIAISON PROGRESS NOTE - NSBHCONSULTRECOMMENDOTHER_PSY_A_CORE FT
- No indication for inpatient psychiatric admission  - Recommend continuing Depakote 250 mg BID PO or IV for agitation and behavioral disturbances; will titrate as needed based on patient's response.  - Recommend standing Seroquel 12.5 mg PO daily @ 15:00 for agitation and behavioral changes in setting of sundowning.  - If patient is agitated during care, recommend pre-medicating with 12.5mg of Seroquel ahead of time to minimize agitation.  - PRN medications as listed above.  - Recommend non-pharmacological interventions to prevent/treat delirium: maintain day/night light cycles, optimize sleep-wake cycle, minimize environmental noise, reorientation frequently, use verbal redirection as first line, minimize restraints and lines, ensure adequate pain control; minimize use of anticholinergic, antihistaminic, and benzodiazepine medications.

## 2025-01-30 NOTE — BH CONSULTATION LIAISON PROGRESS NOTE - CURRENT MEDICATION
MEDICATIONS  (STANDING):  amLODIPine   Tablet 5 milliGRAM(s) Oral daily  atorvastatin 20 milliGRAM(s) Oral at bedtime  carvedilol 6.25 milliGRAM(s) Oral every 12 hours  dextrose 5%. 1000 milliLiter(s) (100 mL/Hr) IV Continuous <Continuous>  dextrose 5%. 1000 milliLiter(s) (50 mL/Hr) IV Continuous <Continuous>  dextrose 50% Injectable 25 Gram(s) IV Push once  dextrose 50% Injectable 12.5 Gram(s) IV Push once  dextrose 50% Injectable 25 Gram(s) IV Push once  divalproex  milliGRAM(s) Oral two times a day  ezetimibe 10 milliGRAM(s) Oral daily  glucagon  Injectable 1 milliGRAM(s) IntraMuscular once  insulin lispro (ADMELOG) corrective regimen sliding scale   SubCutaneous three times a day before meals  pantoprazole    Tablet 40 milliGRAM(s) Oral daily  polyethylene glycol 3350 17 Gram(s) Oral daily  QUEtiapine 50 milliGRAM(s) Oral at bedtime  QUEtiapine 12.5 milliGRAM(s) Oral <User Schedule>    MEDICATIONS  (PRN):  acetaminophen     Tablet .. 650 milliGRAM(s) Oral every 6 hours PRN Temp greater or equal to 38C (100.4F), Mild Pain (1 - 3)  dextrose Oral Gel 15 Gram(s) Oral once PRN Blood Glucose LESS THAN 70 milliGRAM(s)/deciliter  melatonin 3 milliGRAM(s) Oral at bedtime PRN Insomnia  QUEtiapine 12.5 milliGRAM(s) Oral every 6 hours PRN Agitation  
MEDICATIONS  (STANDING):  amLODIPine   Tablet 5 milliGRAM(s) Oral daily  atorvastatin 20 milliGRAM(s) Oral at bedtime  carvedilol 6.25 milliGRAM(s) Oral every 12 hours  dextrose 5%. 1000 milliLiter(s) (50 mL/Hr) IV Continuous <Continuous>  dextrose 5%. 1000 milliLiter(s) (100 mL/Hr) IV Continuous <Continuous>  dextrose 50% Injectable 25 Gram(s) IV Push once  dextrose 50% Injectable 12.5 Gram(s) IV Push once  dextrose 50% Injectable 25 Gram(s) IV Push once  divalproex  milliGRAM(s) Oral two times a day  ezetimibe 10 milliGRAM(s) Oral daily  glucagon  Injectable 1 milliGRAM(s) IntraMuscular once  insulin lispro (ADMELOG) corrective regimen sliding scale   SubCutaneous three times a day before meals  lactated ringers. 1000 milliLiter(s) (100 mL/Hr) IV Continuous <Continuous>  QUEtiapine 50 milliGRAM(s) Oral at bedtime  QUEtiapine 12.5 milliGRAM(s) Oral daily    MEDICATIONS  (PRN):  acetaminophen     Tablet .. 650 milliGRAM(s) Oral every 6 hours PRN Temp greater or equal to 38C (100.4F), Mild Pain (1 - 3)  dextrose Oral Gel 15 Gram(s) Oral once PRN Blood Glucose LESS THAN 70 milliGRAM(s)/deciliter  melatonin 3 milliGRAM(s) Oral at bedtime PRN Insomnia  QUEtiapine 12.5 milliGRAM(s) Oral every 6 hours PRN Agitation

## 2025-01-30 NOTE — PROGRESS NOTE ADULT - ASSESSMENT
79M w/Hx of HTN, HLD, DM2, vertigo, thoracic aneurysm presents due to worsening AMS. Patient has had worsening AMS for over 1 year that had acutely worsened over the last 3 days; neurologic workup only remarkable for chronic microvascular changes seen on MRI. Course c/b by agitation for which  is following; agitation now improving with patient requiring no PRN medication.

## 2025-01-31 LAB
GLUCOSE BLDC GLUCOMTR-MCNC: 133 MG/DL — HIGH (ref 70–99)
GLUCOSE BLDC GLUCOMTR-MCNC: 136 MG/DL — HIGH (ref 70–99)
GLUCOSE BLDC GLUCOMTR-MCNC: 142 MG/DL — HIGH (ref 70–99)
GLUCOSE BLDC GLUCOMTR-MCNC: 157 MG/DL — HIGH (ref 70–99)

## 2025-01-31 PROCEDURE — 99239 HOSP IP/OBS DSCHRG MGMT >30: CPT

## 2025-01-31 RX ORDER — PANTOPRAZOLE 20 MG/1
1 TABLET, DELAYED RELEASE ORAL
Qty: 0 | Refills: 0 | DISCHARGE
Start: 2025-01-31

## 2025-01-31 RX ORDER — ACETAMINOPHEN 160 MG/5ML
2 SUSPENSION ORAL
Qty: 0 | Refills: 0 | DISCHARGE
Start: 2025-01-31

## 2025-01-31 RX ORDER — POLYETHYLENE GLYCOL 3350 17 G/17G
17 POWDER, FOR SOLUTION ORAL
Qty: 0 | Refills: 0 | DISCHARGE
Start: 2025-01-31

## 2025-01-31 RX ORDER — QUETIAPINE FUMARATE 300 MG/1
0.5 TABLET ORAL
Qty: 60 | Refills: 0
Start: 2025-01-31 | End: 2025-03-01

## 2025-01-31 RX ORDER — DIVALPROEX SODIUM 500 MG
1 TABLET, DELAYED RELEASE (ENTERIC COATED) ORAL
Qty: 0 | Refills: 0 | DISCHARGE
Start: 2025-01-31

## 2025-01-31 RX ORDER — QUETIAPINE FUMARATE 300 MG/1
0.5 TABLET ORAL
Qty: 15 | Refills: 0
Start: 2025-01-31 | End: 2025-03-01

## 2025-01-31 RX ORDER — AMLODIPINE BESYLATE 5 MG
1 TABLET ORAL
Refills: 0 | DISCHARGE

## 2025-01-31 RX ORDER — QUETIAPINE FUMARATE 300 MG/1
1 TABLET ORAL
Qty: 0 | Refills: 0 | DISCHARGE
Start: 2025-01-31

## 2025-01-31 RX ORDER — CARVEDILOL 6.25 MG
1 TABLET ORAL
Refills: 0 | DISCHARGE

## 2025-01-31 RX ORDER — AMLODIPINE BESYLATE 5 MG
1 TABLET ORAL
Qty: 0 | Refills: 0 | DISCHARGE
Start: 2025-01-31

## 2025-01-31 RX ORDER — CARVEDILOL 6.25 MG
1 TABLET ORAL
Qty: 0 | Refills: 0 | DISCHARGE
Start: 2025-01-31

## 2025-01-31 RX ORDER — ACETAMINOPHEN, DIPHENHYDRAMINE HCL, PHENYLEPHRINE HCL 325; 25; 5 MG/1; MG/1; MG/1
1 TABLET ORAL
Qty: 0 | Refills: 0 | DISCHARGE
Start: 2025-01-31

## 2025-01-31 RX ADMIN — PANTOPRAZOLE 40 MILLIGRAM(S): 20 TABLET, DELAYED RELEASE ORAL at 11:49

## 2025-01-31 RX ADMIN — Medication 5 MILLIGRAM(S): at 05:15

## 2025-01-31 RX ADMIN — QUETIAPINE FUMARATE 12.5 MILLIGRAM(S): 300 TABLET ORAL at 14:25

## 2025-01-31 RX ADMIN — Medication 10 MILLIGRAM(S): at 11:49

## 2025-01-31 RX ADMIN — POLYETHYLENE GLYCOL 3350 17 GRAM(S): 17 POWDER, FOR SOLUTION ORAL at 11:49

## 2025-01-31 RX ADMIN — Medication 250 MILLIGRAM(S): at 18:02

## 2025-01-31 RX ADMIN — ATORVASTATIN CALCIUM 20 MILLIGRAM(S): 80 TABLET, FILM COATED ORAL at 21:06

## 2025-01-31 RX ADMIN — Medication 6.25 MILLIGRAM(S): at 05:15

## 2025-01-31 RX ADMIN — QUETIAPINE FUMARATE 50 MILLIGRAM(S): 300 TABLET ORAL at 21:06

## 2025-01-31 RX ADMIN — Medication 6.25 MILLIGRAM(S): at 18:02

## 2025-01-31 RX ADMIN — Medication 250 MILLIGRAM(S): at 05:14

## 2025-01-31 NOTE — PROGRESS NOTE ADULT - ATTENDING COMMENTS
79M w/Hx of HTN, HLD, DM2, vertigo, thoracic aneurysm presents due to worsening AMS. Patient has had worsening AMS for over 1 year that had acutely worsened over the last 3 days with increased agitation at home admitted for w/u and placement. Found to have rhabdo    - TSH normal, f/u b12, RPR   - neuro c/s  - MRI brain  - seroquel nightly, maintain sleep/wake cycle, zyprexa PRN for severe agitated  - c/w LR, monitor CK
79M w/Hx of HTN, HLD, DM2, vertigo, thoracic aneurysm presents due to worsening behavioral disturbances. Patient has had worsening AMS for over 1 year that had acutely worsened over the last 3 days with increased agitation at home admitted for evaluation.      Dementia with behavioral disturbances  - no acute issues overnight per nursing. pt is calm and pleasant.  - Psychiatry evaluation appreciated  - MRI brain - reviewed, limited due to motion artifact but there is no evidence of territorial infarct but there is chronic microvascular ischemic change which would support vascular dementia and the patients cognitive impairment.  - Psychiatry follow up appreciated -->  Seroquel 12.5mg PO PRN, Seroquel 12.5mg PO @ 3PM,  Seroquel 50mg qD standing and Depakote 250mg BID.        Dispo: plan for discharge to [facility]  Discharge time spent: 38 min
79M w/Hx of HTN, HLD, DM2, vertigo, thoracic aneurysm presents due to worsening behavioral disturbances. Patient has had worsening AMS for over 1 year that had acutely worsened over the last 3 days with increased agitation at home admitted for evaluation.      Dementia with behavioral disturbances  - no acute issues overnight per nursing. pt is calm and pleasant, eating lunch with PCA assistance.  - Psychiatry evaluation appreciated  - MRI brain - reviewed, limited due to motion artifact but there is no evidence of territorial infarct but there is chronic microvascular ischemic change which would support vascular dementia and the patients cognitive impairment.  - Psychiatry follow up appreciated -->  Seroquel 12.5mg PO PRN, Seroquel 12.5mg PO @ 3PM,  Seroquel 50mg qD standing and Depakote 250mg BID.    I spoke with patients daughter, Raven. She will be going to "[facility]" to evaluate the facility.    d/c planning.
79M w/Hx of HTN, HLD, DM2, vertigo, thoracic aneurysm presents due to worsening behavioral disturbances. Patient has had worsening AMS for over 1 year that had acutely worsened over the last 3 days with increased agitation at home admitted for evaluation.      Dementia with behavioral disturbances  - no acute issues overnight per nursing. pt is calm and pleasant this morning  - c/w seroquel 12.5mg in am and 50mg qhs  - Psychiatry evaluation appreciated  - MRI brain - reviewed, limited due to motion artifact but there is no evidence of territorial infarct but there is chronic microvascular ischemic change which would support vascular dementia and the patients cognitive impairment.    d/c planning.
79M w/Hx of HTN, HLD, DM2, vertigo, thoracic aneurysm presents due to worsening behavioral disturbances. Patient has had worsening AMS for over 1 year that had acutely worsened over the last 3 days with increased agitation at home admitted for evaluation.      Dementia with behavioral disturbances  - no acute issues overnight per nursing. pt is calm and pleasant this morning  - c/w seroquel 12.5mg in am and 50mg qhs  - Psychiatry evaluation appreciated  - MRI brain - reviewed, limited due to motion artifact but there is no evidence of territorial infarct but there is chronic microvascular ischemic change which would support vascular dementia and the patients cognitive impairment.    d/c planning.
79M w/Hx of HTN, HLD, DM2, vertigo, thoracic aneurysm presents due to worsening behavioral disturbances. Patient has had worsening AMS for over 1 year that had acutely worsened over the last 3 days with increased agitation at home admitted for evaluation.      Dementia with behavioral disturbances  - start seroquel 25mg in am and 50mg qhs  - Psychiatry evaluation  - MRI brain - reviewed, limited due to motion artifact but there is no evidence of territorial infarct but there is chronic microvascular ischemic change which would support vascular dementia and the patients cognitive impairment.    -Wife and daughter at bedside all questions answered.

## 2025-01-31 NOTE — PROGRESS NOTE ADULT - PROBLEM SELECTOR PLAN 7
Diet: Regular  Dvt ppx: N/a  Ethics: Full code  Dispo: Social work and case management consult for placement/aid at home for patient and patient's family safety

## 2025-01-31 NOTE — PROGRESS NOTE ADULT - PROBLEM SELECTOR PROBLEM 2
Lewy body dementia

## 2025-01-31 NOTE — PROGRESS NOTE ADULT - PROBLEM SELECTOR PLAN 1
Agitation likely 2/2 to underlying vascular dementia in the setting of med noncompliance vs LBD (below) and/or delirium. Per family, chronic for 1 year and acutely worsened last 6 months with agitation, violence, personality changes, and visual hallucinations.   - F/u TSH - wnl  - F/u RVP - wnl  - BH following, recs appreciated  - Re-orientation is effective  - Seroquel 12.5mg PO PRN  - Seroquel 12.5mg PO @ 3PM (30-60 minutes prior to expected sundown)  - Seroquel 50mg qD standing  - Depakote 250mg BID Agitation likely 2/2 to underlying vascular dementia in the setting of med noncompliance vs LBD (below) and/or delirium. Per family, chronic for 1 year and acutely worsened last 6 months with agitation, violence, personality changes, and visual hallucinations.   - F/u TSH - wnl  - F/u RVP - wnl  - BH following, recs appreciated  - Re-orientation is effective  - Seroquel 12.5mg PO PRN - has not required  - Seroquel 12.5mg PO @ 3PM (30-60 minutes prior to expected sundown)  - Seroquel 50mg qD standing  - Depakote 250mg BID

## 2025-01-31 NOTE — PROGRESS NOTE ADULT - SUBJECTIVE AND OBJECTIVE BOX
Patient is a 79y old  Male who presents with a chief complaint of Agitation, AMS    SUBJECTIVE / OVERNIGHT EVENTS: Patient seen and examined at bedside. No overnight events.    MEDICATIONS  (STANDING):  amLODIPine   Tablet 5 milliGRAM(s) Oral daily  atorvastatin 20 milliGRAM(s) Oral at bedtime  carvedilol 6.25 milliGRAM(s) Oral every 12 hours  dextrose 5%. 1000 milliLiter(s) (100 mL/Hr) IV Continuous <Continuous>  dextrose 5%. 1000 milliLiter(s) (50 mL/Hr) IV Continuous <Continuous>  dextrose 50% Injectable 25 Gram(s) IV Push once  dextrose 50% Injectable 12.5 Gram(s) IV Push once  dextrose 50% Injectable 25 Gram(s) IV Push once  divalproex  milliGRAM(s) Oral two times a day  ezetimibe 10 milliGRAM(s) Oral daily  glucagon  Injectable 1 milliGRAM(s) IntraMuscular once  insulin lispro (ADMELOG) corrective regimen sliding scale   SubCutaneous three times a day before meals  pantoprazole    Tablet 40 milliGRAM(s) Oral daily  polyethylene glycol 3350 17 Gram(s) Oral daily  QUEtiapine 50 milliGRAM(s) Oral at bedtime  QUEtiapine 12.5 milliGRAM(s) Oral <User Schedule>    MEDICATIONS  (PRN):  acetaminophen     Tablet .. 650 milliGRAM(s) Oral every 6 hours PRN Temp greater or equal to 38C (100.4F), Mild Pain (1 - 3)  dextrose Oral Gel 15 Gram(s) Oral once PRN Blood Glucose LESS THAN 70 milliGRAM(s)/deciliter  melatonin 3 milliGRAM(s) Oral at bedtime PRN Insomnia  QUEtiapine 12.5 milliGRAM(s) Oral every 6 hours PRN Agitation    Vital Signs Last 24 Hrs  T(C): 36.5 (31 Jan 2025 04:15), Max: 37.3 (30 Jan 2025 20:00)  T(F): 97.7 (31 Jan 2025 04:15), Max: 99.1 (30 Jan 2025 20:00)  HR: 100 (31 Jan 2025 04:15) (86 - 100)  BP: 146/91 (31 Jan 2025 04:15) (146/91 - 168/80)  BP(mean): --  RR: 18 (31 Jan 2025 04:15) (18 - 18)  SpO2: 100% (31 Jan 2025 04:15) (93% - 100%)    Parameters below as of 31 Jan 2025 04:15  Patient On (Oxygen Delivery Method): room air    CAPILLARY BLOOD GLUCOSE  POCT Blood Glucose.: 140 mg/dL (30 Jan 2025 22:11)  POCT Blood Glucose.: 121 mg/dL (30 Jan 2025 16:21)  POCT Blood Glucose.: 111 mg/dL (30 Jan 2025 12:16)    I&O's Summary    PHYSICAL EXAM:  CONSTITUTIONAL: Lying in bed. No apparent distress.  EYES: PERRLA and symmetric  ENMT: Oral mucosa with moist membranes.  NECK: Supple, symmetric. No JVD.  RESP: No respiratory distress, no use of accessory muscles; CTA b/l, no WRR  CV: +S1S2, no MRG  GI: Soft, NT, ND, no rebound, no guarding  : No suprapubic tenderness. No CVA tenderness.  LYMPH: No cervical LAD or tenderness  MSK: No spinal tenderness, normal muscle strength/tone  EXTREMITIES: No pedal edema  SKIN: No rashes or ulcers noted  NEURO: Oriented to person this AM. Arousable to verbal tactile stimuli, following commands.  PSYCH: Laying in bed, calm, cooperative, with poor insight into his condition.    LABS:                        12.9   11.21 )-----------( 274      ( 29 Jan 2025 22:41 )             38.8     01-29    138  |  101  |  11  ----------------------------<  136[H]  3.9   |  24  |  0.76    Ca    9.4      29 Jan 2025 22:41    TPro  7.2  /  Alb  3.4  /  TBili  0.5  /  DBili  x   /  AST  22  /  ALT  20  /  AlkPhos  72  01-29    Urinalysis Basic - ( 29 Jan 2025 22:41 )    Color: x / Appearance: x / SG: x / pH: x  Gluc: 136 mg/dL / Ketone: x  / Bili: x / Urobili: x   Blood: x / Protein: x / Nitrite: x   Leuk Esterase: x / RBC: x / WBC x   Sq Epi: x / Non Sq Epi: x / Bacteria: x    Armando Lee MD, Internal Medicine Resident Patient is a 79y old  Male who presents with a chief complaint of Agitation, AMS    SUBJECTIVE / OVERNIGHT EVENTS: Patient seen and examined at bedside. No overnight events. Patient without acute concerns this AM, states his abdominal pain has resolved. Pleasant and conversive with nurses this AM. Did not require PRN medication overnight. This AM denies CP, SOB, subjective fever, chills, n/v/d.    MEDICATIONS  (STANDING):  amLODIPine   Tablet 5 milliGRAM(s) Oral daily  atorvastatin 20 milliGRAM(s) Oral at bedtime  carvedilol 6.25 milliGRAM(s) Oral every 12 hours  dextrose 5%. 1000 milliLiter(s) (100 mL/Hr) IV Continuous <Continuous>  dextrose 5%. 1000 milliLiter(s) (50 mL/Hr) IV Continuous <Continuous>  dextrose 50% Injectable 25 Gram(s) IV Push once  dextrose 50% Injectable 12.5 Gram(s) IV Push once  dextrose 50% Injectable 25 Gram(s) IV Push once  divalproex  milliGRAM(s) Oral two times a day  ezetimibe 10 milliGRAM(s) Oral daily  glucagon  Injectable 1 milliGRAM(s) IntraMuscular once  insulin lispro (ADMELOG) corrective regimen sliding scale   SubCutaneous three times a day before meals  pantoprazole    Tablet 40 milliGRAM(s) Oral daily  polyethylene glycol 3350 17 Gram(s) Oral daily  QUEtiapine 50 milliGRAM(s) Oral at bedtime  QUEtiapine 12.5 milliGRAM(s) Oral <User Schedule>    MEDICATIONS  (PRN):  acetaminophen     Tablet .. 650 milliGRAM(s) Oral every 6 hours PRN Temp greater or equal to 38C (100.4F), Mild Pain (1 - 3)  dextrose Oral Gel 15 Gram(s) Oral once PRN Blood Glucose LESS THAN 70 milliGRAM(s)/deciliter  melatonin 3 milliGRAM(s) Oral at bedtime PRN Insomnia  QUEtiapine 12.5 milliGRAM(s) Oral every 6 hours PRN Agitation    Vital Signs Last 24 Hrs  T(C): 36.5 (31 Jan 2025 04:15), Max: 37.3 (30 Jan 2025 20:00)  T(F): 97.7 (31 Jan 2025 04:15), Max: 99.1 (30 Jan 2025 20:00)  HR: 100 (31 Jan 2025 04:15) (86 - 100)  BP: 146/91 (31 Jan 2025 04:15) (146/91 - 168/80)  BP(mean): --  RR: 18 (31 Jan 2025 04:15) (18 - 18)  SpO2: 100% (31 Jan 2025 04:15) (93% - 100%)    Parameters below as of 31 Jan 2025 04:15  Patient On (Oxygen Delivery Method): room air    CAPILLARY BLOOD GLUCOSE  POCT Blood Glucose.: 140 mg/dL (30 Jan 2025 22:11)  POCT Blood Glucose.: 121 mg/dL (30 Jan 2025 16:21)  POCT Blood Glucose.: 111 mg/dL (30 Jan 2025 12:16)    I&O's Summary    PHYSICAL EXAM:  CONSTITUTIONAL: Lying in bed. No apparent distress.  EYES: PERRLA and symmetric  ENMT: Oral mucosa with moist membranes.  NECK: Supple, symmetric. No JVD.  RESP: No respiratory distress, no use of accessory muscles; CTA b/l, no WRR  CV: +S1S2, no MRG  GI: Soft, NT, ND, no rebound, no guarding  : No suprapubic tenderness. No CVA tenderness.  LYMPH: No cervical LAD or tenderness  MSK: No spinal tenderness, normal muscle strength/tone  EXTREMITIES: No pedal edema  SKIN: No rashes or ulcers noted  NEURO: Oriented to person this AM. Arousable to verbal tactile stimuli, following commands.  PSYCH: Laying in bed, calm, cooperative, with poor insight into his condition.    LABS:                        12.9   11.21 )-----------( 274      ( 29 Jan 2025 22:41 )             38.8     01-29    138  |  101  |  11  ----------------------------<  136[H]  3.9   |  24  |  0.76    Ca    9.4      29 Jan 2025 22:41    TPro  7.2  /  Alb  3.4  /  TBili  0.5  /  DBili  x   /  AST  22  /  ALT  20  /  AlkPhos  72  01-29    Urinalysis Basic - ( 29 Jan 2025 22:41 )    Color: x / Appearance: x / SG: x / pH: x  Gluc: 136 mg/dL / Ketone: x  / Bili: x / Urobili: x   Blood: x / Protein: x / Nitrite: x   Leuk Esterase: x / RBC: x / WBC x   Sq Epi: x / Non Sq Epi: x / Bacteria: x    Armando Lee MD, Internal Medicine Resident

## 2025-02-01 ENCOUNTER — TRANSCRIPTION ENCOUNTER (OUTPATIENT)
Age: 80
End: 2025-02-01

## 2025-02-01 VITALS
SYSTOLIC BLOOD PRESSURE: 134 MMHG | DIASTOLIC BLOOD PRESSURE: 78 MMHG | OXYGEN SATURATION: 99 % | HEART RATE: 95 BPM | TEMPERATURE: 97 F

## 2025-02-01 LAB
CULTURE RESULTS: SIGNIFICANT CHANGE UP
CULTURE RESULTS: SIGNIFICANT CHANGE UP
GLUCOSE BLDC GLUCOMTR-MCNC: 125 MG/DL — HIGH (ref 70–99)
SPECIMEN SOURCE: SIGNIFICANT CHANGE UP
SPECIMEN SOURCE: SIGNIFICANT CHANGE UP

## 2025-02-01 PROCEDURE — 87040 BLOOD CULTURE FOR BACTERIA: CPT

## 2025-02-01 PROCEDURE — 0225U NFCT DS DNA&RNA 21 SARSCOV2: CPT

## 2025-02-01 PROCEDURE — 82746 ASSAY OF FOLIC ACID SERUM: CPT

## 2025-02-01 PROCEDURE — 84484 ASSAY OF TROPONIN QUANT: CPT

## 2025-02-01 PROCEDURE — 82550 ASSAY OF CK (CPK): CPT

## 2025-02-01 PROCEDURE — 70450 CT HEAD/BRAIN W/O DYE: CPT | Mod: MC

## 2025-02-01 PROCEDURE — 96374 THER/PROPH/DIAG INJ IV PUSH: CPT

## 2025-02-01 PROCEDURE — 82803 BLOOD GASES ANY COMBINATION: CPT

## 2025-02-01 PROCEDURE — 97110 THERAPEUTIC EXERCISES: CPT

## 2025-02-01 PROCEDURE — 80061 LIPID PANEL: CPT

## 2025-02-01 PROCEDURE — 85027 COMPLETE CBC AUTOMATED: CPT

## 2025-02-01 PROCEDURE — 97162 PT EVAL MOD COMPLEX 30 MIN: CPT

## 2025-02-01 PROCEDURE — 81001 URINALYSIS AUTO W/SCOPE: CPT

## 2025-02-01 PROCEDURE — 36415 COLL VENOUS BLD VENIPUNCTURE: CPT

## 2025-02-01 PROCEDURE — 87637 SARSCOV2&INF A&B&RSV AMP PRB: CPT

## 2025-02-01 PROCEDURE — 86780 TREPONEMA PALLIDUM: CPT

## 2025-02-01 PROCEDURE — 93005 ELECTROCARDIOGRAM TRACING: CPT

## 2025-02-01 PROCEDURE — 97530 THERAPEUTIC ACTIVITIES: CPT

## 2025-02-01 PROCEDURE — 83930 ASSAY OF BLOOD OSMOLALITY: CPT

## 2025-02-01 PROCEDURE — 99285 EMERGENCY DEPT VISIT HI MDM: CPT | Mod: 25

## 2025-02-01 PROCEDURE — A9585: CPT

## 2025-02-01 PROCEDURE — 71045 X-RAY EXAM CHEST 1 VIEW: CPT

## 2025-02-01 PROCEDURE — 83880 ASSAY OF NATRIURETIC PEPTIDE: CPT

## 2025-02-01 PROCEDURE — 83735 ASSAY OF MAGNESIUM: CPT

## 2025-02-01 PROCEDURE — 83036 HEMOGLOBIN GLYCOSYLATED A1C: CPT

## 2025-02-01 PROCEDURE — 70553 MRI BRAIN STEM W/O & W/DYE: CPT | Mod: MC

## 2025-02-01 PROCEDURE — 80053 COMPREHEN METABOLIC PANEL: CPT

## 2025-02-01 PROCEDURE — 84443 ASSAY THYROID STIM HORMONE: CPT

## 2025-02-01 PROCEDURE — 82607 VITAMIN B-12: CPT

## 2025-02-01 PROCEDURE — 84100 ASSAY OF PHOSPHORUS: CPT

## 2025-02-01 PROCEDURE — 85025 COMPLETE CBC W/AUTO DIFF WBC: CPT

## 2025-02-01 PROCEDURE — 82962 GLUCOSE BLOOD TEST: CPT

## 2025-02-01 PROCEDURE — 87086 URINE CULTURE/COLONY COUNT: CPT

## 2025-02-01 PROCEDURE — 84436 ASSAY OF TOTAL THYROXINE: CPT

## 2025-02-01 PROCEDURE — 82010 KETONE BODYS QUAN: CPT

## 2025-02-01 RX ADMIN — Medication 5 MILLIGRAM(S): at 05:28

## 2025-02-01 RX ADMIN — Medication 6.25 MILLIGRAM(S): at 05:28

## 2025-02-01 RX ADMIN — PANTOPRAZOLE 40 MILLIGRAM(S): 20 TABLET, DELAYED RELEASE ORAL at 08:47

## 2025-02-01 RX ADMIN — Medication 10 MILLIGRAM(S): at 08:46

## 2025-02-01 RX ADMIN — Medication 250 MILLIGRAM(S): at 08:46

## 2025-02-01 RX ADMIN — ACETAMINOPHEN 650 MILLIGRAM(S): 160 SUSPENSION ORAL at 10:07

## 2025-02-01 NOTE — DISCHARGE NOTE NURSING/CASE MANAGEMENT/SOCIAL WORK - PATIENT PORTAL LINK FT
You can access the FollowMyHealth Patient Portal offered by [facility] by registering at the following website: [email or web address]. By joining [facility]’s FollowMyHealth portal, you will also be able to view your health information using other applications (apps) compatible with our system.

## 2025-02-01 NOTE — DISCHARGE NOTE NURSING/CASE MANAGEMENT/SOCIAL WORK - FINANCIAL ASSISTANCE
[facility] provides services at a reduced cost to those who are determined to be eligible through [facility]’s financial assistance program. Information regarding [facility]’s financial assistance program can be found by going to [email or web address] or by calling [phone].

## 2025-02-04 LAB
CULTURE RESULTS: SIGNIFICANT CHANGE UP
CULTURE RESULTS: SIGNIFICANT CHANGE UP
SPECIMEN SOURCE: SIGNIFICANT CHANGE UP
SPECIMEN SOURCE: SIGNIFICANT CHANGE UP

## 2025-05-06 ENCOUNTER — RX RENEWAL (OUTPATIENT)
Age: 80
End: 2025-05-06

## 2025-06-13 ENCOUNTER — RX RENEWAL (OUTPATIENT)
Age: 80
End: 2025-06-13

## 2025-07-02 ENCOUNTER — INPATIENT (INPATIENT)
Facility: HOSPITAL | Age: 80
LOS: 2 days | Discharge: NURSING, REHAB OR LONG-TERM CARE | DRG: 392 | End: 2025-07-05
Attending: HOSPITALIST | Admitting: STUDENT IN AN ORGANIZED HEALTH CARE EDUCATION/TRAINING PROGRAM
Payer: MEDICARE

## 2025-07-02 VITALS
DIASTOLIC BLOOD PRESSURE: 86 MMHG | WEIGHT: 139.99 LBS | HEART RATE: 83 BPM | OXYGEN SATURATION: 100 % | RESPIRATION RATE: 20 BRPM | SYSTOLIC BLOOD PRESSURE: 148 MMHG | TEMPERATURE: 98 F

## 2025-07-02 DIAGNOSIS — Z96.659 PRESENCE OF UNSPECIFIED ARTIFICIAL KNEE JOINT: Chronic | ICD-10-CM

## 2025-07-02 DIAGNOSIS — Z96.652 PRESENCE OF LEFT ARTIFICIAL KNEE JOINT: Chronic | ICD-10-CM

## 2025-07-02 DIAGNOSIS — R13.10 DYSPHAGIA, UNSPECIFIED: ICD-10-CM

## 2025-07-02 LAB
ALBUMIN SERPL ELPH-MCNC: 3.7 G/DL — SIGNIFICANT CHANGE UP (ref 3.3–5)
ALP SERPL-CCNC: 77 U/L — SIGNIFICANT CHANGE UP (ref 40–120)
ALT FLD-CCNC: 14 U/L — SIGNIFICANT CHANGE UP (ref 10–45)
ANION GAP SERPL CALC-SCNC: 15 MMOL/L — SIGNIFICANT CHANGE UP (ref 5–17)
APPEARANCE UR: CLEAR — SIGNIFICANT CHANGE UP
AST SERPL-CCNC: 15 U/L — SIGNIFICANT CHANGE UP (ref 10–40)
BASOPHILS # BLD AUTO: 0.01 K/UL — SIGNIFICANT CHANGE UP (ref 0–0.2)
BASOPHILS NFR BLD AUTO: 0.1 % — SIGNIFICANT CHANGE UP (ref 0–2)
BILIRUB SERPL-MCNC: 0.3 MG/DL — SIGNIFICANT CHANGE UP (ref 0.2–1.2)
BILIRUB UR-MCNC: NEGATIVE — SIGNIFICANT CHANGE UP
BUN SERPL-MCNC: 11 MG/DL — SIGNIFICANT CHANGE UP (ref 7–23)
CALCIUM SERPL-MCNC: 9.5 MG/DL — SIGNIFICANT CHANGE UP (ref 8.4–10.5)
CHLORIDE SERPL-SCNC: 100 MMOL/L — SIGNIFICANT CHANGE UP (ref 96–108)
CO2 SERPL-SCNC: 22 MMOL/L — SIGNIFICANT CHANGE UP (ref 22–31)
COLOR SPEC: YELLOW — SIGNIFICANT CHANGE UP
CREAT SERPL-MCNC: 0.59 MG/DL — SIGNIFICANT CHANGE UP (ref 0.5–1.3)
DIFF PNL FLD: NEGATIVE — SIGNIFICANT CHANGE UP
EGFR: 100 ML/MIN/1.73M2 — SIGNIFICANT CHANGE UP
EGFR: 100 ML/MIN/1.73M2 — SIGNIFICANT CHANGE UP
EOSINOPHIL # BLD AUTO: 0.03 K/UL — SIGNIFICANT CHANGE UP (ref 0–0.5)
EOSINOPHIL NFR BLD AUTO: 0.4 % — SIGNIFICANT CHANGE UP (ref 0–6)
GAS PNL BLDV: SIGNIFICANT CHANGE UP
GAS PNL BLDV: SIGNIFICANT CHANGE UP
GLUCOSE SERPL-MCNC: 101 MG/DL — HIGH (ref 70–99)
GLUCOSE UR QL: NEGATIVE MG/DL — SIGNIFICANT CHANGE UP
HCT VFR BLD CALC: 40.7 % — SIGNIFICANT CHANGE UP (ref 39–50)
HGB BLD-MCNC: 13.3 G/DL — SIGNIFICANT CHANGE UP (ref 13–17)
IMM GRANULOCYTES # BLD AUTO: 0.04 K/UL — SIGNIFICANT CHANGE UP (ref 0–0.07)
IMM GRANULOCYTES NFR BLD AUTO: 0.6 % — SIGNIFICANT CHANGE UP (ref 0–0.9)
KETONES UR QL: NEGATIVE MG/DL — SIGNIFICANT CHANGE UP
LEUKOCYTE ESTERASE UR-ACNC: NEGATIVE — SIGNIFICANT CHANGE UP
LYMPHOCYTES # BLD AUTO: 0.97 K/UL — LOW (ref 1–3.3)
LYMPHOCYTES NFR BLD AUTO: 13.4 % — SIGNIFICANT CHANGE UP (ref 13–44)
MCHC RBC-ENTMCNC: 29.1 PG — SIGNIFICANT CHANGE UP (ref 27–34)
MCHC RBC-ENTMCNC: 32.7 G/DL — SIGNIFICANT CHANGE UP (ref 32–36)
MCV RBC AUTO: 89.1 FL — SIGNIFICANT CHANGE UP (ref 80–100)
MONOCYTES # BLD AUTO: 0.57 K/UL — SIGNIFICANT CHANGE UP (ref 0–0.9)
MONOCYTES NFR BLD AUTO: 7.9 % — SIGNIFICANT CHANGE UP (ref 2–14)
NEUTROPHILS # BLD AUTO: 5.64 K/UL — SIGNIFICANT CHANGE UP (ref 1.8–7.4)
NEUTROPHILS NFR BLD AUTO: 77.6 % — HIGH (ref 43–77)
NITRITE UR-MCNC: NEGATIVE — SIGNIFICANT CHANGE UP
NRBC # BLD AUTO: 0 K/UL — SIGNIFICANT CHANGE UP (ref 0–0)
NRBC # FLD: 0 K/UL — SIGNIFICANT CHANGE UP (ref 0–0)
NRBC BLD AUTO-RTO: 0 /100 WBCS — SIGNIFICANT CHANGE UP (ref 0–0)
PH UR: 7 — SIGNIFICANT CHANGE UP (ref 5–8)
PLATELET # BLD AUTO: 298 K/UL — SIGNIFICANT CHANGE UP (ref 150–400)
PMV BLD: 10.4 FL — SIGNIFICANT CHANGE UP (ref 7–13)
POTASSIUM SERPL-MCNC: 4.2 MMOL/L — SIGNIFICANT CHANGE UP (ref 3.5–5.3)
POTASSIUM SERPL-SCNC: 4.2 MMOL/L — SIGNIFICANT CHANGE UP (ref 3.5–5.3)
PROT SERPL-MCNC: 6.8 G/DL — SIGNIFICANT CHANGE UP (ref 6–8.3)
PROT UR-MCNC: SIGNIFICANT CHANGE UP MG/DL
RBC # BLD: 4.57 M/UL — SIGNIFICANT CHANGE UP (ref 4.2–5.8)
RBC # FLD: 13.4 % — SIGNIFICANT CHANGE UP (ref 10.3–14.5)
SODIUM SERPL-SCNC: 137 MMOL/L — SIGNIFICANT CHANGE UP (ref 135–145)
SP GR SPEC: 1.01 — SIGNIFICANT CHANGE UP (ref 1–1.03)
UROBILINOGEN FLD QL: 1 MG/DL — SIGNIFICANT CHANGE UP (ref 0.2–1)
WBC # BLD: 7.26 K/UL — SIGNIFICANT CHANGE UP (ref 3.8–10.5)
WBC # FLD AUTO: 7.26 K/UL — SIGNIFICANT CHANGE UP (ref 3.8–10.5)

## 2025-07-02 PROCEDURE — 84295 ASSAY OF SERUM SODIUM: CPT

## 2025-07-02 PROCEDURE — 85018 HEMOGLOBIN: CPT

## 2025-07-02 PROCEDURE — 82803 BLOOD GASES ANY COMBINATION: CPT

## 2025-07-02 PROCEDURE — 80053 COMPREHEN METABOLIC PANEL: CPT

## 2025-07-02 PROCEDURE — 84132 ASSAY OF SERUM POTASSIUM: CPT

## 2025-07-02 PROCEDURE — 81003 URINALYSIS AUTO W/O SCOPE: CPT

## 2025-07-02 PROCEDURE — 85025 COMPLETE CBC W/AUTO DIFF WBC: CPT

## 2025-07-02 PROCEDURE — 71045 X-RAY EXAM CHEST 1 VIEW: CPT | Mod: 26

## 2025-07-02 PROCEDURE — 82947 ASSAY GLUCOSE BLOOD QUANT: CPT

## 2025-07-02 PROCEDURE — 85014 HEMATOCRIT: CPT

## 2025-07-02 PROCEDURE — 83605 ASSAY OF LACTIC ACID: CPT

## 2025-07-02 PROCEDURE — 82330 ASSAY OF CALCIUM: CPT

## 2025-07-02 PROCEDURE — 82435 ASSAY OF BLOOD CHLORIDE: CPT

## 2025-07-02 PROCEDURE — 93010 ELECTROCARDIOGRAM REPORT: CPT

## 2025-07-02 PROCEDURE — 71045 X-RAY EXAM CHEST 1 VIEW: CPT

## 2025-07-02 PROCEDURE — 99285 EMERGENCY DEPT VISIT HI MDM: CPT | Mod: GC

## 2025-07-02 RX ADMIN — Medication 500 MILLILITER(S): at 19:48

## 2025-07-03 ENCOUNTER — RESULT REVIEW (OUTPATIENT)
Age: 80
End: 2025-07-03

## 2025-07-03 ENCOUNTER — TRANSCRIPTION ENCOUNTER (OUTPATIENT)
Age: 80
End: 2025-07-03

## 2025-07-03 ENCOUNTER — NON-APPOINTMENT (OUTPATIENT)
Age: 80
End: 2025-07-03

## 2025-07-03 DIAGNOSIS — Z75.8 OTHER PROBLEMS RELATED TO MEDICAL FACILITIES AND OTHER HEALTH CARE: ICD-10-CM

## 2025-07-03 DIAGNOSIS — Z29.9 ENCOUNTER FOR PROPHYLACTIC MEASURES, UNSPECIFIED: ICD-10-CM

## 2025-07-03 DIAGNOSIS — R41.0 DISORIENTATION, UNSPECIFIED: ICD-10-CM

## 2025-07-03 DIAGNOSIS — E11.9 TYPE 2 DIABETES MELLITUS WITHOUT COMPLICATIONS: ICD-10-CM

## 2025-07-03 DIAGNOSIS — J69.0 PNEUMONITIS DUE TO INHALATION OF FOOD AND VOMIT: ICD-10-CM

## 2025-07-03 DIAGNOSIS — I25.10 ATHEROSCLEROTIC HEART DISEASE OF NATIVE CORONARY ARTERY WITHOUT ANGINA PECTORIS: ICD-10-CM

## 2025-07-03 DIAGNOSIS — I10 ESSENTIAL (PRIMARY) HYPERTENSION: ICD-10-CM

## 2025-07-03 LAB
ADD ON TEST-SPECIMEN IN LAB: SIGNIFICANT CHANGE UP
ANION GAP SERPL CALC-SCNC: 15 MMOL/L — SIGNIFICANT CHANGE UP (ref 5–17)
BASOPHILS # BLD AUTO: 0.02 K/UL — SIGNIFICANT CHANGE UP (ref 0–0.2)
BASOPHILS NFR BLD AUTO: 0.3 % — SIGNIFICANT CHANGE UP (ref 0–2)
BUN SERPL-MCNC: 9 MG/DL — SIGNIFICANT CHANGE UP (ref 7–23)
CALCIUM SERPL-MCNC: 9.3 MG/DL — SIGNIFICANT CHANGE UP (ref 8.4–10.5)
CHLORIDE SERPL-SCNC: 102 MMOL/L — SIGNIFICANT CHANGE UP (ref 96–108)
CO2 SERPL-SCNC: 20 MMOL/L — LOW (ref 22–31)
CREAT SERPL-MCNC: 0.7 MG/DL — SIGNIFICANT CHANGE UP (ref 0.5–1.3)
EGFR: 95 ML/MIN/1.73M2 — SIGNIFICANT CHANGE UP
EGFR: 95 ML/MIN/1.73M2 — SIGNIFICANT CHANGE UP
EOSINOPHIL # BLD AUTO: 0.06 K/UL — SIGNIFICANT CHANGE UP (ref 0–0.5)
EOSINOPHIL NFR BLD AUTO: 0.8 % — SIGNIFICANT CHANGE UP (ref 0–6)
GLUCOSE BLDC GLUCOMTR-MCNC: 106 MG/DL — HIGH (ref 70–99)
GLUCOSE BLDC GLUCOMTR-MCNC: 120 MG/DL — HIGH (ref 70–99)
GLUCOSE BLDC GLUCOMTR-MCNC: 121 MG/DL — HIGH (ref 70–99)
GLUCOSE BLDC GLUCOMTR-MCNC: 81 MG/DL — SIGNIFICANT CHANGE UP (ref 70–99)
GLUCOSE SERPL-MCNC: 77 MG/DL — SIGNIFICANT CHANGE UP (ref 70–99)
HCT VFR BLD CALC: 35.9 % — LOW (ref 39–50)
HGB BLD-MCNC: 11.9 G/DL — LOW (ref 13–17)
IMM GRANULOCYTES # BLD AUTO: 0.03 K/UL — SIGNIFICANT CHANGE UP (ref 0–0.07)
IMM GRANULOCYTES NFR BLD AUTO: 0.4 % — SIGNIFICANT CHANGE UP (ref 0–0.9)
LYMPHOCYTES # BLD AUTO: 1.26 K/UL — SIGNIFICANT CHANGE UP (ref 1–3.3)
LYMPHOCYTES NFR BLD AUTO: 17.8 % — SIGNIFICANT CHANGE UP (ref 13–44)
MCHC RBC-ENTMCNC: 29 PG — SIGNIFICANT CHANGE UP (ref 27–34)
MCHC RBC-ENTMCNC: 33.1 G/DL — SIGNIFICANT CHANGE UP (ref 32–36)
MCV RBC AUTO: 87.3 FL — SIGNIFICANT CHANGE UP (ref 80–100)
MONOCYTES # BLD AUTO: 0.72 K/UL — SIGNIFICANT CHANGE UP (ref 0–0.9)
MONOCYTES NFR BLD AUTO: 10.2 % — SIGNIFICANT CHANGE UP (ref 2–14)
NEUTROPHILS # BLD AUTO: 4.97 K/UL — SIGNIFICANT CHANGE UP (ref 1.8–7.4)
NEUTROPHILS NFR BLD AUTO: 70.5 % — SIGNIFICANT CHANGE UP (ref 43–77)
NRBC # BLD AUTO: 0 K/UL — SIGNIFICANT CHANGE UP (ref 0–0)
NRBC # FLD: 0 K/UL — SIGNIFICANT CHANGE UP (ref 0–0)
NRBC BLD AUTO-RTO: 0 /100 WBCS — SIGNIFICANT CHANGE UP (ref 0–0)
PLATELET # BLD AUTO: 323 K/UL — SIGNIFICANT CHANGE UP (ref 150–400)
PMV BLD: 10.6 FL — SIGNIFICANT CHANGE UP (ref 7–13)
POTASSIUM SERPL-MCNC: 3.8 MMOL/L — SIGNIFICANT CHANGE UP (ref 3.5–5.3)
POTASSIUM SERPL-SCNC: 3.8 MMOL/L — SIGNIFICANT CHANGE UP (ref 3.5–5.3)
PROCALCITONIN SERPL-MCNC: 0.04 NG/ML — SIGNIFICANT CHANGE UP (ref 0.02–0.1)
RBC # BLD: 4.11 M/UL — LOW (ref 4.2–5.8)
RBC # FLD: 13.2 % — SIGNIFICANT CHANGE UP (ref 10.3–14.5)
SODIUM SERPL-SCNC: 137 MMOL/L — SIGNIFICANT CHANGE UP (ref 135–145)
WBC # BLD: 7.06 K/UL — SIGNIFICANT CHANGE UP (ref 3.8–10.5)
WBC # FLD AUTO: 7.06 K/UL — SIGNIFICANT CHANGE UP (ref 3.8–10.5)

## 2025-07-03 PROCEDURE — 99223 1ST HOSP IP/OBS HIGH 75: CPT

## 2025-07-03 PROCEDURE — 85014 HEMATOCRIT: CPT

## 2025-07-03 PROCEDURE — 80048 BASIC METABOLIC PNL TOTAL CA: CPT

## 2025-07-03 PROCEDURE — 82803 BLOOD GASES ANY COMBINATION: CPT

## 2025-07-03 PROCEDURE — 92610 EVALUATE SWALLOWING FUNCTION: CPT

## 2025-07-03 PROCEDURE — 70450 CT HEAD/BRAIN W/O DYE: CPT | Mod: 26

## 2025-07-03 PROCEDURE — 93308 TTE F-UP OR LMTD: CPT | Mod: 26

## 2025-07-03 PROCEDURE — 84145 PROCALCITONIN (PCT): CPT

## 2025-07-03 PROCEDURE — 82962 GLUCOSE BLOOD TEST: CPT

## 2025-07-03 PROCEDURE — 84132 ASSAY OF SERUM POTASSIUM: CPT

## 2025-07-03 PROCEDURE — 87040 BLOOD CULTURE FOR BACTERIA: CPT

## 2025-07-03 PROCEDURE — 84295 ASSAY OF SERUM SODIUM: CPT

## 2025-07-03 PROCEDURE — 85025 COMPLETE CBC W/AUTO DIFF WBC: CPT

## 2025-07-03 PROCEDURE — 99497 ADVNCD CARE PLAN 30 MIN: CPT | Mod: 25

## 2025-07-03 PROCEDURE — 74177 CT ABD & PELVIS W/CONTRAST: CPT

## 2025-07-03 PROCEDURE — 82947 ASSAY GLUCOSE BLOOD QUANT: CPT

## 2025-07-03 PROCEDURE — 74177 CT ABD & PELVIS W/CONTRAST: CPT | Mod: 26

## 2025-07-03 PROCEDURE — 82330 ASSAY OF CALCIUM: CPT

## 2025-07-03 PROCEDURE — 70450 CT HEAD/BRAIN W/O DYE: CPT

## 2025-07-03 PROCEDURE — 80053 COMPREHEN METABOLIC PANEL: CPT

## 2025-07-03 PROCEDURE — 82435 ASSAY OF BLOOD CHLORIDE: CPT

## 2025-07-03 PROCEDURE — 36415 COLL VENOUS BLD VENIPUNCTURE: CPT

## 2025-07-03 PROCEDURE — 81003 URINALYSIS AUTO W/O SCOPE: CPT

## 2025-07-03 PROCEDURE — 71045 X-RAY EXAM CHEST 1 VIEW: CPT

## 2025-07-03 PROCEDURE — 93321 DOPPLER ECHO F-UP/LMTD STD: CPT | Mod: 26

## 2025-07-03 PROCEDURE — 85018 HEMOGLOBIN: CPT

## 2025-07-03 PROCEDURE — 83605 ASSAY OF LACTIC ACID: CPT

## 2025-07-03 RX ORDER — PIPERACILLIN-TAZO-DEXTROSE,ISO 3.375G/5
3.38 IV SOLUTION, PIGGYBACK PREMIX FROZEN(ML) INTRAVENOUS ONCE
Refills: 0 | Status: COMPLETED | OUTPATIENT
Start: 2025-07-03 | End: 2025-07-03

## 2025-07-03 RX ORDER — DEXTROSE 50 % IN WATER 50 %
15 SYRINGE (ML) INTRAVENOUS ONCE
Refills: 0 | Status: DISCONTINUED | OUTPATIENT
Start: 2025-07-03 | End: 2025-07-05

## 2025-07-03 RX ORDER — DEXTROSE 50 % IN WATER 50 %
12.5 SYRINGE (ML) INTRAVENOUS ONCE
Refills: 0 | Status: DISCONTINUED | OUTPATIENT
Start: 2025-07-03 | End: 2025-07-05

## 2025-07-03 RX ORDER — INSULIN LISPRO 100 U/ML
INJECTION, SOLUTION INTRAVENOUS; SUBCUTANEOUS AT BEDTIME
Refills: 0 | Status: DISCONTINUED | OUTPATIENT
Start: 2025-07-03 | End: 2025-07-05

## 2025-07-03 RX ORDER — GLUCAGON 3 MG/1
1 POWDER NASAL ONCE
Refills: 0 | Status: DISCONTINUED | OUTPATIENT
Start: 2025-07-03 | End: 2025-07-05

## 2025-07-03 RX ORDER — SODIUM CHLORIDE 9 G/1000ML
1000 INJECTION, SOLUTION INTRAVENOUS
Refills: 0 | Status: DISCONTINUED | OUTPATIENT
Start: 2025-07-03 | End: 2025-07-05

## 2025-07-03 RX ORDER — CARVEDILOL 3.12 MG/1
6.25 TABLET, FILM COATED ORAL EVERY 12 HOURS
Refills: 0 | Status: DISCONTINUED | OUTPATIENT
Start: 2025-07-03 | End: 2025-07-05

## 2025-07-03 RX ORDER — INSULIN LISPRO 100 U/ML
INJECTION, SOLUTION INTRAVENOUS; SUBCUTANEOUS EVERY 6 HOURS
Refills: 0 | Status: DISCONTINUED | OUTPATIENT
Start: 2025-07-03 | End: 2025-07-03

## 2025-07-03 RX ORDER — HEPARIN SODIUM 1000 [USP'U]/ML
5000 INJECTION INTRAVENOUS; SUBCUTANEOUS EVERY 8 HOURS
Refills: 0 | Status: DISCONTINUED | OUTPATIENT
Start: 2025-07-03 | End: 2025-07-03

## 2025-07-03 RX ORDER — INSULIN LISPRO 100 U/ML
INJECTION, SOLUTION INTRAVENOUS; SUBCUTANEOUS
Refills: 0 | Status: DISCONTINUED | OUTPATIENT
Start: 2025-07-03 | End: 2025-07-05

## 2025-07-03 RX ORDER — AMLODIPINE BESYLATE 10 MG/1
5 TABLET ORAL DAILY
Refills: 0 | Status: DISCONTINUED | OUTPATIENT
Start: 2025-07-03 | End: 2025-07-05

## 2025-07-03 RX ORDER — QUETIAPINE FUMARATE 25 MG/1
0.5 TABLET ORAL
Refills: 0 | DISCHARGE

## 2025-07-03 RX ORDER — ESCITALOPRAM OXALATE 20 MG/1
2 TABLET ORAL
Refills: 0 | DISCHARGE

## 2025-07-03 RX ORDER — DEXTROSE 50 % IN WATER 50 %
25 SYRINGE (ML) INTRAVENOUS ONCE
Refills: 0 | Status: DISCONTINUED | OUTPATIENT
Start: 2025-07-03 | End: 2025-07-05

## 2025-07-03 RX ORDER — HEPARIN SODIUM 1000 [USP'U]/ML
5000 INJECTION INTRAVENOUS; SUBCUTANEOUS EVERY 8 HOURS
Refills: 0 | Status: DISCONTINUED | OUTPATIENT
Start: 2025-07-03 | End: 2025-07-05

## 2025-07-03 RX ORDER — PIPERACILLIN-TAZO-DEXTROSE,ISO 3.375G/5
3.38 IV SOLUTION, PIGGYBACK PREMIX FROZEN(ML) INTRAVENOUS EVERY 8 HOURS
Refills: 0 | Status: DISCONTINUED | OUTPATIENT
Start: 2025-07-03 | End: 2025-07-04

## 2025-07-03 RX ADMIN — HEPARIN SODIUM 5000 UNIT(S): 1000 INJECTION INTRAVENOUS; SUBCUTANEOUS at 11:51

## 2025-07-03 RX ADMIN — AMLODIPINE BESYLATE 5 MILLIGRAM(S): 10 TABLET ORAL at 04:24

## 2025-07-03 RX ADMIN — HEPARIN SODIUM 5000 UNIT(S): 1000 INJECTION INTRAVENOUS; SUBCUTANEOUS at 22:02

## 2025-07-03 RX ADMIN — Medication 25 GRAM(S): at 13:54

## 2025-07-03 RX ADMIN — Medication 25 GRAM(S): at 04:25

## 2025-07-03 RX ADMIN — CARVEDILOL 6.25 MILLIGRAM(S): 3.12 TABLET, FILM COATED ORAL at 04:25

## 2025-07-03 RX ADMIN — Medication 25 GRAM(S): at 21:59

## 2025-07-03 RX ADMIN — CARVEDILOL 6.25 MILLIGRAM(S): 3.12 TABLET, FILM COATED ORAL at 17:36

## 2025-07-03 RX ADMIN — Medication 200 GRAM(S): at 01:43

## 2025-07-04 LAB
A1C WITH ESTIMATED AVERAGE GLUCOSE RESULT: 5.9 % — HIGH (ref 4–5.6)
ESTIMATED AVERAGE GLUCOSE: 123 MG/DL — HIGH (ref 68–114)
GLUCOSE BLDC GLUCOMTR-MCNC: 118 MG/DL — HIGH (ref 70–99)
GLUCOSE BLDC GLUCOMTR-MCNC: 168 MG/DL — HIGH (ref 70–99)
GLUCOSE BLDC GLUCOMTR-MCNC: 81 MG/DL — SIGNIFICANT CHANGE UP (ref 70–99)
GLUCOSE BLDC GLUCOMTR-MCNC: 95 MG/DL — SIGNIFICANT CHANGE UP (ref 70–99)
HCT VFR BLD CALC: 36.3 % — LOW (ref 39–50)
HGB BLD-MCNC: 12.1 G/DL — LOW (ref 13–17)
MCHC RBC-ENTMCNC: 29.4 PG — SIGNIFICANT CHANGE UP (ref 27–34)
MCHC RBC-ENTMCNC: 33.3 G/DL — SIGNIFICANT CHANGE UP (ref 32–36)
MCV RBC AUTO: 88.1 FL — SIGNIFICANT CHANGE UP (ref 80–100)
NRBC # BLD AUTO: 0 K/UL — SIGNIFICANT CHANGE UP (ref 0–0)
NRBC # FLD: 0 K/UL — SIGNIFICANT CHANGE UP (ref 0–0)
NRBC BLD AUTO-RTO: 0 /100 WBCS — SIGNIFICANT CHANGE UP (ref 0–0)
PLATELET # BLD AUTO: 290 K/UL — SIGNIFICANT CHANGE UP (ref 150–400)
PMV BLD: 10.1 FL — SIGNIFICANT CHANGE UP (ref 7–13)
RBC # BLD: 4.12 M/UL — LOW (ref 4.2–5.8)
RBC # FLD: 13.5 % — SIGNIFICANT CHANGE UP (ref 10.3–14.5)
WBC # BLD: 5.99 K/UL — SIGNIFICANT CHANGE UP (ref 3.8–10.5)
WBC # FLD AUTO: 5.99 K/UL — SIGNIFICANT CHANGE UP (ref 3.8–10.5)

## 2025-07-04 PROCEDURE — 83605 ASSAY OF LACTIC ACID: CPT

## 2025-07-04 PROCEDURE — 87040 BLOOD CULTURE FOR BACTERIA: CPT

## 2025-07-04 PROCEDURE — 82803 BLOOD GASES ANY COMBINATION: CPT

## 2025-07-04 PROCEDURE — 83036 HEMOGLOBIN GLYCOSYLATED A1C: CPT

## 2025-07-04 PROCEDURE — 70450 CT HEAD/BRAIN W/O DYE: CPT

## 2025-07-04 PROCEDURE — 85018 HEMOGLOBIN: CPT

## 2025-07-04 PROCEDURE — 93010 ELECTROCARDIOGRAM REPORT: CPT

## 2025-07-04 PROCEDURE — 82435 ASSAY OF BLOOD CHLORIDE: CPT

## 2025-07-04 PROCEDURE — 84132 ASSAY OF SERUM POTASSIUM: CPT

## 2025-07-04 PROCEDURE — 93308 TTE F-UP OR LMTD: CPT

## 2025-07-04 PROCEDURE — 84295 ASSAY OF SERUM SODIUM: CPT

## 2025-07-04 PROCEDURE — 80053 COMPREHEN METABOLIC PANEL: CPT

## 2025-07-04 PROCEDURE — 85014 HEMATOCRIT: CPT

## 2025-07-04 PROCEDURE — 74177 CT ABD & PELVIS W/CONTRAST: CPT

## 2025-07-04 PROCEDURE — 99232 SBSQ HOSP IP/OBS MODERATE 35: CPT

## 2025-07-04 PROCEDURE — 93321 DOPPLER ECHO F-UP/LMTD STD: CPT

## 2025-07-04 PROCEDURE — 81003 URINALYSIS AUTO W/O SCOPE: CPT

## 2025-07-04 PROCEDURE — 36415 COLL VENOUS BLD VENIPUNCTURE: CPT

## 2025-07-04 PROCEDURE — 82330 ASSAY OF CALCIUM: CPT

## 2025-07-04 PROCEDURE — 92610 EVALUATE SWALLOWING FUNCTION: CPT

## 2025-07-04 PROCEDURE — 85025 COMPLETE CBC W/AUTO DIFF WBC: CPT

## 2025-07-04 PROCEDURE — 82962 GLUCOSE BLOOD TEST: CPT

## 2025-07-04 PROCEDURE — 84145 PROCALCITONIN (PCT): CPT

## 2025-07-04 PROCEDURE — 80048 BASIC METABOLIC PNL TOTAL CA: CPT

## 2025-07-04 PROCEDURE — 85027 COMPLETE CBC AUTOMATED: CPT

## 2025-07-04 PROCEDURE — 71045 X-RAY EXAM CHEST 1 VIEW: CPT

## 2025-07-04 PROCEDURE — 82947 ASSAY GLUCOSE BLOOD QUANT: CPT

## 2025-07-04 RX ORDER — QUETIAPINE FUMARATE 25 MG/1
50 TABLET ORAL AT BEDTIME
Refills: 0 | Status: DISCONTINUED | OUTPATIENT
Start: 2025-07-04 | End: 2025-07-05

## 2025-07-04 RX ORDER — ATORVASTATIN CALCIUM 80 MG/1
20 TABLET, FILM COATED ORAL AT BEDTIME
Refills: 0 | Status: DISCONTINUED | OUTPATIENT
Start: 2025-07-04 | End: 2025-07-05

## 2025-07-04 RX ORDER — ESCITALOPRAM OXALATE 20 MG/1
5 TABLET ORAL DAILY
Refills: 0 | Status: DISCONTINUED | OUTPATIENT
Start: 2025-07-04 | End: 2025-07-05

## 2025-07-04 RX ORDER — SPIRONOLACTONE 25 MG
25 TABLET ORAL DAILY
Refills: 0 | Status: DISCONTINUED | OUTPATIENT
Start: 2025-07-04 | End: 2025-07-05

## 2025-07-04 RX ORDER — QUETIAPINE FUMARATE 25 MG/1
12.5 TABLET ORAL
Refills: 0 | Status: DISCONTINUED | OUTPATIENT
Start: 2025-07-04 | End: 2025-07-05

## 2025-07-04 RX ORDER — QUETIAPINE FUMARATE 25 MG/1
12.5 TABLET ORAL DAILY
Refills: 0 | Status: DISCONTINUED | OUTPATIENT
Start: 2025-07-04 | End: 2025-07-04

## 2025-07-04 RX ADMIN — ATORVASTATIN CALCIUM 20 MILLIGRAM(S): 80 TABLET, FILM COATED ORAL at 21:19

## 2025-07-04 RX ADMIN — HEPARIN SODIUM 5000 UNIT(S): 1000 INJECTION INTRAVENOUS; SUBCUTANEOUS at 11:53

## 2025-07-04 RX ADMIN — Medication 25 GRAM(S): at 05:15

## 2025-07-04 RX ADMIN — Medication 25 MILLIGRAM(S): at 17:55

## 2025-07-04 RX ADMIN — QUETIAPINE FUMARATE 50 MILLIGRAM(S): 25 TABLET ORAL at 21:19

## 2025-07-04 RX ADMIN — CARVEDILOL 6.25 MILLIGRAM(S): 3.12 TABLET, FILM COATED ORAL at 05:15

## 2025-07-04 RX ADMIN — HEPARIN SODIUM 5000 UNIT(S): 1000 INJECTION INTRAVENOUS; SUBCUTANEOUS at 21:18

## 2025-07-04 RX ADMIN — AMLODIPINE BESYLATE 5 MILLIGRAM(S): 10 TABLET ORAL at 05:15

## 2025-07-04 RX ADMIN — HEPARIN SODIUM 5000 UNIT(S): 1000 INJECTION INTRAVENOUS; SUBCUTANEOUS at 05:14

## 2025-07-04 RX ADMIN — CARVEDILOL 6.25 MILLIGRAM(S): 3.12 TABLET, FILM COATED ORAL at 17:56

## 2025-07-04 RX ADMIN — INSULIN LISPRO 1: 100 INJECTION, SOLUTION INTRAVENOUS; SUBCUTANEOUS at 11:53

## 2025-07-05 ENCOUNTER — TRANSCRIPTION ENCOUNTER (OUTPATIENT)
Age: 80
End: 2025-07-05

## 2025-07-05 VITALS
DIASTOLIC BLOOD PRESSURE: 82 MMHG | RESPIRATION RATE: 18 BRPM | TEMPERATURE: 98 F | HEART RATE: 66 BPM | OXYGEN SATURATION: 100 % | SYSTOLIC BLOOD PRESSURE: 157 MMHG

## 2025-07-05 LAB
ANION GAP SERPL CALC-SCNC: 15 MMOL/L — SIGNIFICANT CHANGE UP (ref 5–17)
BUN SERPL-MCNC: 10 MG/DL — SIGNIFICANT CHANGE UP (ref 7–23)
CALCIUM SERPL-MCNC: 9.1 MG/DL — SIGNIFICANT CHANGE UP (ref 8.4–10.5)
CHLORIDE SERPL-SCNC: 104 MMOL/L — SIGNIFICANT CHANGE UP (ref 96–108)
CO2 SERPL-SCNC: 22 MMOL/L — SIGNIFICANT CHANGE UP (ref 22–31)
CREAT SERPL-MCNC: 0.67 MG/DL — SIGNIFICANT CHANGE UP (ref 0.5–1.3)
EGFR: 96 ML/MIN/1.73M2 — SIGNIFICANT CHANGE UP
EGFR: 96 ML/MIN/1.73M2 — SIGNIFICANT CHANGE UP
GLUCOSE BLDC GLUCOMTR-MCNC: 101 MG/DL — HIGH (ref 70–99)
GLUCOSE BLDC GLUCOMTR-MCNC: 114 MG/DL — HIGH (ref 70–99)
GLUCOSE SERPL-MCNC: 93 MG/DL — SIGNIFICANT CHANGE UP (ref 70–99)
HCT VFR BLD CALC: 37.8 % — LOW (ref 39–50)
HGB BLD-MCNC: 12.7 G/DL — LOW (ref 13–17)
MCHC RBC-ENTMCNC: 29.3 PG — SIGNIFICANT CHANGE UP (ref 27–34)
MCHC RBC-ENTMCNC: 33.6 G/DL — SIGNIFICANT CHANGE UP (ref 32–36)
MCV RBC AUTO: 87.3 FL — SIGNIFICANT CHANGE UP (ref 80–100)
NRBC # BLD AUTO: 0 K/UL — SIGNIFICANT CHANGE UP (ref 0–0)
NRBC # FLD: 0 K/UL — SIGNIFICANT CHANGE UP (ref 0–0)
NRBC BLD AUTO-RTO: 0 /100 WBCS — SIGNIFICANT CHANGE UP (ref 0–0)
PLATELET # BLD AUTO: 270 K/UL — SIGNIFICANT CHANGE UP (ref 150–400)
PMV BLD: 10.7 FL — SIGNIFICANT CHANGE UP (ref 7–13)
POTASSIUM SERPL-MCNC: 4 MMOL/L — SIGNIFICANT CHANGE UP (ref 3.5–5.3)
POTASSIUM SERPL-SCNC: 4 MMOL/L — SIGNIFICANT CHANGE UP (ref 3.5–5.3)
RBC # BLD: 4.33 M/UL — SIGNIFICANT CHANGE UP (ref 4.2–5.8)
RBC # FLD: 13.3 % — SIGNIFICANT CHANGE UP (ref 10.3–14.5)
SODIUM SERPL-SCNC: 141 MMOL/L — SIGNIFICANT CHANGE UP (ref 135–145)
WBC # BLD: 5.24 K/UL — SIGNIFICANT CHANGE UP (ref 3.8–10.5)
WBC # FLD AUTO: 5.24 K/UL — SIGNIFICANT CHANGE UP (ref 3.8–10.5)

## 2025-07-05 PROCEDURE — 70450 CT HEAD/BRAIN W/O DYE: CPT

## 2025-07-05 PROCEDURE — 80053 COMPREHEN METABOLIC PANEL: CPT

## 2025-07-05 PROCEDURE — 81003 URINALYSIS AUTO W/O SCOPE: CPT

## 2025-07-05 PROCEDURE — 93321 DOPPLER ECHO F-UP/LMTD STD: CPT

## 2025-07-05 PROCEDURE — 99285 EMERGENCY DEPT VISIT HI MDM: CPT

## 2025-07-05 PROCEDURE — 82947 ASSAY GLUCOSE BLOOD QUANT: CPT

## 2025-07-05 PROCEDURE — 85014 HEMATOCRIT: CPT

## 2025-07-05 PROCEDURE — 85025 COMPLETE CBC W/AUTO DIFF WBC: CPT

## 2025-07-05 PROCEDURE — 82330 ASSAY OF CALCIUM: CPT

## 2025-07-05 PROCEDURE — 84295 ASSAY OF SERUM SODIUM: CPT

## 2025-07-05 PROCEDURE — 71045 X-RAY EXAM CHEST 1 VIEW: CPT

## 2025-07-05 PROCEDURE — 92610 EVALUATE SWALLOWING FUNCTION: CPT

## 2025-07-05 PROCEDURE — 85018 HEMOGLOBIN: CPT

## 2025-07-05 PROCEDURE — 82435 ASSAY OF BLOOD CHLORIDE: CPT

## 2025-07-05 PROCEDURE — 83605 ASSAY OF LACTIC ACID: CPT

## 2025-07-05 PROCEDURE — 99239 HOSP IP/OBS DSCHRG MGMT >30: CPT

## 2025-07-05 PROCEDURE — 36415 COLL VENOUS BLD VENIPUNCTURE: CPT

## 2025-07-05 PROCEDURE — 84145 PROCALCITONIN (PCT): CPT

## 2025-07-05 PROCEDURE — 93005 ELECTROCARDIOGRAM TRACING: CPT

## 2025-07-05 PROCEDURE — 84132 ASSAY OF SERUM POTASSIUM: CPT

## 2025-07-05 PROCEDURE — 83036 HEMOGLOBIN GLYCOSYLATED A1C: CPT

## 2025-07-05 PROCEDURE — 82962 GLUCOSE BLOOD TEST: CPT

## 2025-07-05 PROCEDURE — 93308 TTE F-UP OR LMTD: CPT

## 2025-07-05 PROCEDURE — 74177 CT ABD & PELVIS W/CONTRAST: CPT

## 2025-07-05 PROCEDURE — 85027 COMPLETE CBC AUTOMATED: CPT

## 2025-07-05 PROCEDURE — 80048 BASIC METABOLIC PNL TOTAL CA: CPT

## 2025-07-05 PROCEDURE — 82803 BLOOD GASES ANY COMBINATION: CPT

## 2025-07-05 PROCEDURE — 87040 BLOOD CULTURE FOR BACTERIA: CPT

## 2025-07-05 RX ORDER — ESCITALOPRAM OXALATE 20 MG/1
1 TABLET ORAL
Qty: 0 | Refills: 0 | DISCHARGE
Start: 2025-07-05

## 2025-07-05 RX ORDER — SPIRONOLACTONE 25 MG
1 TABLET ORAL
Qty: 0 | Refills: 0 | DISCHARGE
Start: 2025-07-05

## 2025-07-05 RX ORDER — ESCITALOPRAM OXALATE 20 MG/1
1 TABLET ORAL
Refills: 0 | DISCHARGE

## 2025-07-05 RX ADMIN — HEPARIN SODIUM 5000 UNIT(S): 1000 INJECTION INTRAVENOUS; SUBCUTANEOUS at 05:05

## 2025-07-05 RX ADMIN — AMLODIPINE BESYLATE 5 MILLIGRAM(S): 10 TABLET ORAL at 05:05

## 2025-07-05 RX ADMIN — QUETIAPINE FUMARATE 12.5 MILLIGRAM(S): 25 TABLET ORAL at 09:48

## 2025-07-05 RX ADMIN — Medication 25 MILLIGRAM(S): at 05:05

## 2025-07-05 RX ADMIN — CARVEDILOL 6.25 MILLIGRAM(S): 3.12 TABLET, FILM COATED ORAL at 05:05

## 2025-07-18 ENCOUNTER — TRANSCRIPTION ENCOUNTER (OUTPATIENT)
Age: 80
End: 2025-07-18

## 2025-07-23 ENCOUNTER — TRANSCRIPTION ENCOUNTER (OUTPATIENT)
Age: 80
End: 2025-07-23